# Patient Record
Sex: MALE | Race: WHITE | Employment: OTHER | URBAN - METROPOLITAN AREA
[De-identification: names, ages, dates, MRNs, and addresses within clinical notes are randomized per-mention and may not be internally consistent; named-entity substitution may affect disease eponyms.]

---

## 2017-01-01 ENCOUNTER — OFFICE VISIT (OUTPATIENT)
Dept: UROLOGY | Age: 82
End: 2017-01-01

## 2017-01-01 ENCOUNTER — OFFICE VISIT (OUTPATIENT)
Dept: INTERNAL MEDICINE CLINIC | Age: 82
End: 2017-01-01

## 2017-01-01 ENCOUNTER — DOCUMENTATION ONLY (OUTPATIENT)
Dept: UROLOGY | Age: 82
End: 2017-01-01

## 2017-01-01 ENCOUNTER — HOSPITAL ENCOUNTER (OUTPATIENT)
Dept: LAB | Age: 82
Discharge: HOME | End: 2017-10-18
Payer: MEDICARE

## 2017-01-01 ENCOUNTER — TELEPHONE (OUTPATIENT)
Dept: INTERNAL MEDICINE CLINIC | Age: 82
End: 2017-01-01

## 2017-01-01 VITALS
TEMPERATURE: 97.7 F | SYSTOLIC BLOOD PRESSURE: 100 MMHG | DIASTOLIC BLOOD PRESSURE: 64 MMHG | BODY MASS INDEX: 24.11 KG/M2 | WEIGHT: 178 LBS | HEIGHT: 72 IN | HEART RATE: 80 BPM | OXYGEN SATURATION: 98 %

## 2017-01-01 VITALS
BODY MASS INDEX: 24.79 KG/M2 | HEART RATE: 85 BPM | OXYGEN SATURATION: 98 % | WEIGHT: 183 LBS | RESPIRATION RATE: 16 BRPM | DIASTOLIC BLOOD PRESSURE: 62 MMHG | TEMPERATURE: 97.2 F | HEIGHT: 72 IN | SYSTOLIC BLOOD PRESSURE: 140 MMHG

## 2017-01-01 DIAGNOSIS — I10 ESSENTIAL HYPERTENSION: ICD-10-CM

## 2017-01-01 DIAGNOSIS — N40.1 BPH ASSOCIATED WITH NOCTURIA: Primary | ICD-10-CM

## 2017-01-01 DIAGNOSIS — R05.9 COUGH: Primary | ICD-10-CM

## 2017-01-01 DIAGNOSIS — E55.9 VITAMIN D INSUFFICIENCY: Chronic | ICD-10-CM

## 2017-01-01 DIAGNOSIS — R30.0 DYSURIA: ICD-10-CM

## 2017-01-01 DIAGNOSIS — N30.40 RADIATION CYSTITIS: ICD-10-CM

## 2017-01-01 DIAGNOSIS — Z00.00 MEDICARE ANNUAL WELLNESS VISIT, SUBSEQUENT: ICD-10-CM

## 2017-01-01 DIAGNOSIS — Z12.5 PROSTATE CANCER SCREENING: ICD-10-CM

## 2017-01-01 DIAGNOSIS — R06.2 WHEEZING: ICD-10-CM

## 2017-01-01 DIAGNOSIS — E03.9 HYPOTHYROIDISM, UNSPECIFIED TYPE: Chronic | ICD-10-CM

## 2017-01-01 DIAGNOSIS — N40.0 BENIGN PROSTATIC HYPERPLASIA, PRESENCE OF LOWER URINARY TRACT SYMPTOMS UNSPECIFIED, UNSPECIFIED MORPHOLOGY: ICD-10-CM

## 2017-01-01 DIAGNOSIS — R35.1 BPH ASSOCIATED WITH NOCTURIA: Primary | ICD-10-CM

## 2017-01-01 DIAGNOSIS — E78.5 DYSLIPIDEMIA: Chronic | ICD-10-CM

## 2017-01-01 DIAGNOSIS — Z12.5 PROSTATE CANCER SCREENING: Primary | ICD-10-CM

## 2017-01-01 LAB
25(OH)D3 SERPL-MCNC: 44.3 NG/ML (ref 30–100)
ALBUMIN SERPL-MCNC: 3.4 G/DL (ref 3.4–5)
ALBUMIN/GLOB SERPL: 0.9 {RATIO} (ref 0.8–1.7)
ALP SERPL-CCNC: 120 U/L (ref 45–117)
ALT SERPL-CCNC: 24 U/L (ref 16–61)
ANION GAP SERPL CALC-SCNC: 8 MMOL/L (ref 3–18)
AST SERPL-CCNC: 18 U/L (ref 15–37)
BASOPHILS # BLD: 0 K/UL (ref 0–0.06)
BASOPHILS NFR BLD: 0 % (ref 0–2)
BILIRUB SERPL-MCNC: 0.3 MG/DL (ref 0.2–1)
BILIRUB UR QL STRIP: NEGATIVE
BUN SERPL-MCNC: 19 MG/DL (ref 7–18)
BUN/CREAT SERPL: 22 (ref 12–20)
CALCIUM SERPL-MCNC: 9.6 MG/DL (ref 8.5–10.1)
CHLORIDE SERPL-SCNC: 103 MMOL/L (ref 100–108)
CHOLEST SERPL-MCNC: 209 MG/DL
CO2 SERPL-SCNC: 28 MMOL/L (ref 21–32)
CREAT SERPL-MCNC: 0.87 MG/DL (ref 0.6–1.3)
DIFFERENTIAL METHOD BLD: ABNORMAL
EOSINOPHIL # BLD: 0.2 K/UL (ref 0–0.4)
EOSINOPHIL NFR BLD: 2 % (ref 0–5)
ERYTHROCYTE [DISTWIDTH] IN BLOOD BY AUTOMATED COUNT: 15.4 % (ref 11.6–14.5)
GLOBULIN SER CALC-MCNC: 3.6 G/DL (ref 2–4)
GLUCOSE SERPL-MCNC: 99 MG/DL (ref 74–99)
GLUCOSE UR-MCNC: NEGATIVE MG/DL
HCT VFR BLD AUTO: 34.7 % (ref 36–48)
HDLC SERPL-MCNC: 76 MG/DL (ref 40–60)
HDLC SERPL: 2.8 {RATIO} (ref 0–5)
HGB BLD-MCNC: 10.9 G/DL (ref 13–16)
KETONES P FAST UR STRIP-MCNC: NEGATIVE MG/DL
LDLC SERPL CALC-MCNC: 100.4 MG/DL (ref 0–100)
LIPID PROFILE,FLP: ABNORMAL
LYMPHOCYTES # BLD: 1.1 K/UL (ref 0.9–3.6)
LYMPHOCYTES NFR BLD: 13 % (ref 21–52)
MCH RBC QN AUTO: 28.8 PG (ref 24–34)
MCHC RBC AUTO-ENTMCNC: 31.4 G/DL (ref 31–37)
MCV RBC AUTO: 91.6 FL (ref 74–97)
MONOCYTES # BLD: 0.6 K/UL (ref 0.05–1.2)
MONOCYTES NFR BLD: 7 % (ref 3–10)
NEUTS SEG # BLD: 6.6 K/UL (ref 1.8–8)
NEUTS SEG NFR BLD: 78 % (ref 40–73)
PH UR STRIP: 6.5 [PH] (ref 4.6–8)
PLATELET # BLD AUTO: 359 K/UL (ref 135–420)
PMV BLD AUTO: 9.6 FL (ref 9.2–11.8)
POTASSIUM SERPL-SCNC: 4.4 MMOL/L (ref 3.5–5.5)
PROT SERPL-MCNC: 7 G/DL (ref 6.4–8.2)
PROT UR QL STRIP: NEGATIVE MG/DL
PSA SERPL-MCNC: 0.2 NG/ML (ref 0–4)
RBC # BLD AUTO: 3.79 M/UL (ref 4.7–5.5)
SODIUM SERPL-SCNC: 139 MMOL/L (ref 136–145)
SP GR UR STRIP: 1.01 (ref 1–1.03)
TRIGL SERPL-MCNC: 163 MG/DL (ref ?–150)
TSH SERPL DL<=0.05 MIU/L-ACNC: 1.73 UIU/ML (ref 0.36–3.74)
UA UROBILINOGEN AMB POC: NORMAL (ref 0.2–1)
URINALYSIS CLARITY POC: CLEAR
URINALYSIS COLOR POC: YELLOW
URINE BLOOD POC: NEGATIVE
URINE LEUKOCYTES POC: NEGATIVE
URINE NITRITES POC: NEGATIVE
VLDLC SERPL CALC-MCNC: 32.6 MG/DL
WBC # BLD AUTO: 8.5 K/UL (ref 4.6–13.2)

## 2017-01-01 PROCEDURE — 80061 LIPID PANEL: CPT | Performed by: NURSE PRACTITIONER

## 2017-01-01 PROCEDURE — 84153 ASSAY OF PSA TOTAL: CPT | Performed by: NURSE PRACTITIONER

## 2017-01-01 PROCEDURE — 84443 ASSAY THYROID STIM HORMONE: CPT | Performed by: NURSE PRACTITIONER

## 2017-01-01 PROCEDURE — 85025 COMPLETE CBC W/AUTO DIFF WBC: CPT | Performed by: NURSE PRACTITIONER

## 2017-01-01 PROCEDURE — 80053 COMPREHEN METABOLIC PANEL: CPT | Performed by: NURSE PRACTITIONER

## 2017-01-01 PROCEDURE — 82306 VITAMIN D 25 HYDROXY: CPT | Performed by: NURSE PRACTITIONER

## 2017-01-01 RX ORDER — ALBUTEROL SULFATE 90 UG/1
2 AEROSOL, METERED RESPIRATORY (INHALATION)
Qty: 1 INHALER | Refills: 1 | Status: SHIPPED | OUTPATIENT
Start: 2017-01-01

## 2017-01-01 RX ORDER — CODEINE PHOSPHATE AND GUAIFENESIN 10; 100 MG/5ML; MG/5ML
SOLUTION ORAL
Qty: 120 ML | Refills: 0 | Status: SHIPPED | OUTPATIENT
Start: 2017-01-01

## 2017-01-01 RX ORDER — CEFUROXIME AXETIL 500 MG/1
500 TABLET ORAL 2 TIMES DAILY
Qty: 20 TAB | Refills: 0 | Status: SHIPPED | OUTPATIENT
Start: 2017-01-01 | End: 2017-01-01

## 2017-01-01 RX ORDER — OXYBUTYNIN CHLORIDE 5 MG/1
5 TABLET ORAL 3 TIMES DAILY
Qty: 90 TAB | Refills: 6 | Status: SHIPPED | OUTPATIENT
Start: 2017-01-01 | End: 2018-01-01 | Stop reason: SDUPTHER

## 2017-01-01 RX ORDER — TAMSULOSIN HYDROCHLORIDE 0.4 MG/1
CAPSULE ORAL
Qty: 90 CAP | Refills: 3 | Status: SHIPPED | OUTPATIENT
Start: 2017-01-01 | End: 2018-01-01 | Stop reason: SDUPTHER

## 2017-01-17 DIAGNOSIS — E03.9 HYPOTHYROIDISM, UNSPECIFIED TYPE: Chronic | ICD-10-CM

## 2017-01-17 RX ORDER — LEVOTHYROXINE SODIUM 88 UG/1
TABLET ORAL
Qty: 30 TAB | Refills: 0 | Status: SHIPPED | OUTPATIENT
Start: 2017-01-17 | End: 2017-02-15 | Stop reason: SDUPTHER

## 2017-01-24 ENCOUNTER — OFFICE VISIT (OUTPATIENT)
Dept: VASCULAR SURGERY | Age: 82
End: 2017-01-24

## 2017-01-24 DIAGNOSIS — I77.9 BILATERAL CAROTID ARTERY DISEASE (HCC): ICD-10-CM

## 2017-01-24 NOTE — PROCEDURES
[facility]   *** FINAL REPORT ***    Name: Jan Cedillo  MRN: [id number]       Outpatient  : 15 Oct 1935  HIS Order #: [id number]  [address] [facility] Visit #: [id number]  Date: 2017    TYPE OF TEST: Cerebrovascular Duplex    REASON FOR TEST  F/U CEA 6 mo, 12 mo,     Right Carotid:-             Proximal               Mid                 Distal  cm/s  Systolic  Diastolic  Systolic  Diastolic  Systolic  Diastolic  CCA:     62.5      18.0                            87.0      15.0  Bulb:   108.0      20.0  ECA:    114.0       4.0  ICA:    166.0      36.0      164.0      34.0      118.0      26.0  ICA/CCA:  1.7       2.0    ICA Stenosis: 50-69%    Right Vertebral:-  Finding: Antegrade  Sys:       88.0  Iva:       20.0    Right Subclavian:    Left Carotid:-            Proximal                Mid                 Distal  cm/s  Systolic  Diastolic  Systolic  Diastolic  Systolic  Diastolic  CCA:    380.0      14.0                           119.0      19.0  Bulb:    62.0      10.0  ECA:    105.0       3.0  ICA:     87.0      22.0       93.0      22.0      102.0      25.0  ICA/CCA:  0.9       1.3    ICA Stenosis: <50%    Left Vertebral:-  Finding: Antegrade  Sys:       71.0  Iva:       10.0    Left Subclavian:    INTERPRETATION/FINDINGS  Duplex images were obtained using 2-D gray scale, color flow and  spectral doppler analysis. 1. Moderate 50-69% stenosis in the right internal carotid artery. 2. Patent left carotid artery endarterectomy with mild plaquing of the   left internal carotid artery in the less than 50% range. 3. No significant stenosis in the external carotid arteries  bilaterally. 4. Antegrade flow in both vertebral arteries. Plaque Morphology:  1. Heterogeneous plaque in the bulb and right ICA. 2. Heterogeneous plaque in the bulb and left ICA. ADDITIONAL COMMENTS    I have personally reviewed the data relevant to the interpretation of  this  study. TECHNOLOGIST: Mihaela Dee, VIANCA, BS  Signed: 01/24/2017 11:25 AM    PHYSICIAN: Floyd Ambrose D.O.   Signed: 01/24/2017 01:07 PM

## 2017-02-02 ENCOUNTER — OFFICE VISIT (OUTPATIENT)
Dept: INTERNAL MEDICINE CLINIC | Age: 82
End: 2017-02-02

## 2017-02-02 ENCOUNTER — HOSPITAL ENCOUNTER (OUTPATIENT)
Dept: LAB | Age: 82
Discharge: HOME | End: 2017-02-02
Payer: MEDICARE

## 2017-02-02 VITALS
BODY MASS INDEX: 23.16 KG/M2 | WEIGHT: 171 LBS | RESPIRATION RATE: 18 BRPM | SYSTOLIC BLOOD PRESSURE: 136 MMHG | DIASTOLIC BLOOD PRESSURE: 70 MMHG | HEART RATE: 71 BPM | OXYGEN SATURATION: 98 % | TEMPERATURE: 98.6 F | HEIGHT: 72 IN

## 2017-02-02 DIAGNOSIS — E03.8 OTHER SPECIFIED HYPOTHYROIDISM: Chronic | ICD-10-CM

## 2017-02-02 DIAGNOSIS — D62 ANEMIA DUE TO ACUTE BLOOD LOSS: ICD-10-CM

## 2017-02-02 DIAGNOSIS — E03.8 OTHER SPECIFIED HYPOTHYROIDISM: Primary | Chronic | ICD-10-CM

## 2017-02-02 DIAGNOSIS — I10 ESSENTIAL HYPERTENSION: ICD-10-CM

## 2017-02-02 DIAGNOSIS — E78.5 DYSLIPIDEMIA: Chronic | ICD-10-CM

## 2017-02-02 LAB
BASOPHILS # BLD AUTO: 0 K/UL (ref 0–0.06)
BASOPHILS # BLD: 1 % (ref 0–2)
DIFFERENTIAL METHOD BLD: ABNORMAL
EOSINOPHIL # BLD: 0.1 K/UL (ref 0–0.4)
EOSINOPHIL NFR BLD: 2 % (ref 0–5)
ERYTHROCYTE [DISTWIDTH] IN BLOOD BY AUTOMATED COUNT: 16.7 % (ref 11.6–14.5)
HCT VFR BLD AUTO: 37.8 % (ref 36–48)
HGB BLD-MCNC: 11.8 G/DL (ref 13–16)
LYMPHOCYTES # BLD AUTO: 17 % (ref 21–52)
LYMPHOCYTES # BLD: 1.1 K/UL (ref 0.9–3.6)
MCH RBC QN AUTO: 28.3 PG (ref 24–34)
MCHC RBC AUTO-ENTMCNC: 31.2 G/DL (ref 31–37)
MCV RBC AUTO: 90.6 FL (ref 74–97)
MONOCYTES # BLD: 0.6 K/UL (ref 0.05–1.2)
MONOCYTES NFR BLD AUTO: 9 % (ref 3–10)
NEUTS SEG # BLD: 4.6 K/UL (ref 1.8–8)
NEUTS SEG NFR BLD AUTO: 71 % (ref 40–73)
PLATELET # BLD AUTO: 306 K/UL (ref 135–420)
PMV BLD AUTO: 9.9 FL (ref 9.2–11.8)
RBC # BLD AUTO: 4.17 M/UL (ref 4.7–5.5)
TSH SERPL DL<=0.05 MIU/L-ACNC: 3.55 UIU/ML (ref 0.36–3.74)
WBC # BLD AUTO: 6.5 K/UL (ref 4.6–13.2)

## 2017-02-02 PROCEDURE — 85025 COMPLETE CBC W/AUTO DIFF WBC: CPT | Performed by: NURSE PRACTITIONER

## 2017-02-02 PROCEDURE — 84443 ASSAY THYROID STIM HORMONE: CPT | Performed by: NURSE PRACTITIONER
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
## 2017-02-02 NOTE — MR AVS SNAPSHOT
Visit Information Date & Time Provider Department Dept. Phone Encounter #  
 2/2/2017 10:30 AM Carol Saldaña NP [facility] INTERNAL MEDICINE OF [address] [place] [address] Drive [phone] [id number] Follow-up Instructions Return in about 3 months (around 4/28/2017). Your Appointments 2/6/2017 10:00 AM  
Follow Up with [facility] (KEVEN SCHEDULING) Appt Note: . [address] [address]  
[phone]  
  
   
 [facility] [address]  
  
    
 4/25/2017 10:45 AM  
ULTRASOUND with Rivas Calzada MD  
[facility] [address]) Appt Note: PVR  
 [address] [place] [address] [address]  
[phone] [address] [place] [id number] Upcoming Health Maintenance Date Due DTaP/Tdap/Td series (1 - Tdap) 10/15/1958 ZOSTER VACCINE AGE 60> 10/15/1997 GLAUCOMA SCREENING Q2Y 10/15/2002 Pneumococcal 65+ Low/Medium Risk (2 of 2 - PCV13) 3/29/2017 MEDICARE YEARLY EXAM 10/19/2017 Allergies as of 2/2/2017  Review Complete On: 2/2/2017 By: Carol Saldaña NP No Known Allergies Current Immunizations  Reviewed on 10/18/2016 Name Date Influenza High Dose Vaccine PF 10/18/2016 Influenza Vaccine 10/25/2013 Influenza Vaccine PF 10/27/2015 Pneumococcal Polysaccharide (PPSV-23) 3/29/2016 12:55 PM  
  
 Not reviewed this visit You Were Diagnosed With   
  
 Codes Comments Other specified hypothyroidism    -  Primary ICD-10-CM: E03.8 ICD-9-CM: 244.8 Essential hypertension     ICD-10-CM: I10 
ICD-9-CM: 401.9 Anemia due to acute blood loss     ICD-10-CM: D62 
ICD-9-CM: 285.1 Vitals BP Pulse Temp Resp Height(growth percentile) Weight(growth percentile) 136/70 (BP 1 Location: Left arm, BP Patient Position: Sitting) 71 98.6 °F (37 °C) (Tympanic) 18 6' (1.829 m) 171 lb (77.6 kg) SpO2 BMI Smoking Status 98% 23.19 kg/m2 Former Smoker BMI and BSA Data Body Mass Index Body Surface Area  
 23.19 kg/m 2 1.99 m 2 Preferred Pharmacy Pharmacy Name Phone [facility] [address] [facility] [phone] Your Updated Medication List  
  
   
This list is accurate as of: 2/2/17 11:51 AM.  Always use your most recent med list. amLODIPine 5 mg tablet Commonly known as:  Wandra Sakshi TAKE ONE TABLET BY MOUTH ONCE DAILY  
  
 aspirin 81 mg chewable tablet Take 1 Tab by mouth daily. clopidogrel 75 mg Tab Commonly known as:  PLAVIX Take 1 Tab by mouth daily (with dinner). isosorbide mononitrate ER 60 mg CR tablet Commonly known as:  IMDUR  
1 tablet each AM  
  
 levothyroxine 88 mcg tablet Commonly known as:  SYNTHROID  
TAKE ONE TABLET BY MOUTH ONCE DAILY BEFORE BREAKFAST  
  
 lovastatin 20 mg tablet Commonly known as:  MEVACOR Take 1 Tab by mouth daily. MIRALAX PO Take  by mouth as needed. multivitamin with iron tablet Take 1 Tab by mouth daily. OTHER Incentive Spirometry- Use as directed  
  
 oxybutynin 5 mg tablet Commonly known as:  BNHWIWHP Take 1 Tab by mouth three (3) times daily. Indications: BLADDER HYPERACTIVITY, URINARY URGENCY  
  
 oxyCODONE-acetaminophen 7.5-325 mg per tablet Commonly known as:  PERCOCET 7.5  
1 tablet every six hours as needed for pain  
  
 tamsulosin 0.4 mg capsule Commonly known as:  FLOMAX  
1 tablet daily at supper Follow-up Instructions Return in about 3 months (around 4/28/2017). Introducing [facility]! Vahid Ortega introduces [employer] patient portal. Now you can access parts of your medical record, email your doctor's office, and request medication refills online. 1. In your internet browser, go to [email or web address] 2. Click on the First Time User? Click Here link in the Sign In box. You will see the New Member Sign Up page. 3. Enter your Wi3 Access Code exactly as it appears below. You will not need to use this code after youve completed the sign-up process. If you do not sign up before the expiration date, you must request a new code. · Wi3 Access Code: [email or web address] Expires: 3/20/2017 10:15 AM 
 
4. Enter the last four digits of your Social Security Number (xxxx) and Date of Birth (mm/dd/yyyy) as indicated and click Submit. You will be taken to the next sign-up page. 5. Create a Wi3 ID. This will be your Wi3 login ID and cannot be changed, so think of one that is secure and easy to remember. 6. Create a Wi3 password. You can change your password at any time. 7. Enter your Password Reset Question and Answer. This can be used at a later time if you forget your password. 8. Enter your e-mail address. You will receive e-mail notification when new information is available in [address] 9. Click Sign Up. You can now view and download portions of your medical record. 10. Click the Download Summary menu link to download a portable copy of your medical information. If you have questions, please visit the Frequently Asked Questions section of the Wi3 website. Remember, Wi3 is NOT to be used for urgent needs. For medical emergencies, dial 911. Now available from your iPhone and Android! Please provide this summary of care documentation to your next provider. Your primary care clinician is listed as Sandra Lenz. If you have any questions after today's visit, please call [phone].

## 2017-02-02 NOTE — PROGRESS NOTES
Patient presents for   Chief Complaint   Patient presents with    Well Male     Fall risk assessment was not indicated. Depression screening was not indicated Follow up questions were not indicated. 1. Have you been to the ER, urgent care clinic since your last visit? Hospitalized since your last visit? No    2. Have you seen or consulted any other health care providers outside of the [address] [place] [address] since your last visit? Include any pap smears or colon screening.  No

## 2017-02-02 NOTE — ACP (ADVANCE CARE PLANNING)
Non-Provider Advance Care Planning (ACP) Note    Date of ACP Conversation: 2/2/2017  Persons included in Conversation: patient  Length of ACP Conversation in minutes: <16 minutes (Non-Billable)    Conversation requested by:   Provider      Authorized Decision Maker (if patient is incapable of making informed decisions): This person is:   Other Legally Authorized Decision Maker (e.g. Next of Kin)    General ACP for ALL Patients with Decision Making Capacity:    Advance Directive Conversation with Patients who have not yet planned:  Importance of advance care planning, including choosing a healthcare agent to communicate patient's healthcare decisions if patient lost the ability to make decisions, such as after a sudden illness or accident  Reviewed healthcare agent role and authority/roth  Recommended additional conversation with prospective agent    Review of Existing Advance Directive: (Select questions covered)  No Advanced Directive    Interventions Provided:  Provided ACP educational materials:  Advance Directive Form  Recommended completion of Advance Directive after review of materials, conversation with prospective healthcare agent about (and others as needed), and readiness to complete a written plan  Referral to Provider for additional medical information/discussion

## 2017-02-06 ENCOUNTER — OFFICE VISIT (OUTPATIENT)
Dept: VASCULAR SURGERY | Age: 82
End: 2017-02-06

## 2017-02-06 VITALS
BODY MASS INDEX: 23.16 KG/M2 | SYSTOLIC BLOOD PRESSURE: 132 MMHG | RESPIRATION RATE: 18 BRPM | HEART RATE: 74 BPM | HEIGHT: 72 IN | DIASTOLIC BLOOD PRESSURE: 70 MMHG | WEIGHT: 171 LBS

## 2017-02-06 DIAGNOSIS — Z96.642 S/P HIP REPLACEMENT, LEFT: ICD-10-CM

## 2017-02-06 DIAGNOSIS — Z98.890 HISTORY OF LEFT-SIDED CAROTID ENDARTERECTOMY: ICD-10-CM

## 2017-02-06 DIAGNOSIS — Z95.828 S/P VASCULAR BYPASS: ICD-10-CM

## 2017-02-06 DIAGNOSIS — I65.23 BILATERAL CAROTID ARTERY STENOSIS: ICD-10-CM

## 2017-02-06 DIAGNOSIS — I73.9 PERIPHERAL VASCULAR DISEASE (HCC): Primary | Chronic | ICD-10-CM
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
## 2017-02-06 NOTE — MR AVS SNAPSHOT
Visit Information Date & Time Provider Department Dept. Phone Encounter #  
 2/6/2017 10:00  [facility] Vein/Vascular [address] [facility] [id number] Your Appointments 3/3/2017 12:45 PM  
PROCEDURE with [facility] IMAGING 2  
[facility] (KEVEN SCHEDULING) Appt Note: [facility] [address] [address]  
[phone] [address] [address]  
  
    
 3/3/2017  1:45 PM  
PROCEDURE with [facility] IMAGING 2  
[facility] (KEVEN SCHEDULING) Appt Note: [facility] [address] [address]  
[phone]  
  
    
 3/3/2017  2:45 PM  
PROCEDURE with [facility] NONIMAGING  
[facility] (KEVEN SCHEDULING) Appt Note: [facility] [address] [address]  
[phone] [address] [address]  
  
    
 4/25/2017 10:45 AM  
ULTRASOUND with Marisol Maravilla MD  
[facility] [address]) Appt Note: PVR  
 [address] [facility] [address] [address]  
[phone] [address] [address]  
  
    
 4/28/2017 10:00 AM  
Follow Up with Bruna Wade NP  
[facility] [place] ([address]) Appt Note: 3 mos [address], [address]  
  
   
 [address], [address] [place] [address] Upcoming Health Maintenance Date Due DTaP/Tdap/Td series (1 - Tdap) 10/15/1956 ZOSTER VACCINE AGE 60> 10/15/1997 GLAUCOMA SCREENING Q2Y 10/15/2002 Pneumococcal 65+ Low/Medium Risk (2 of 2 - PCV13) 3/29/2017 MEDICARE YEARLY EXAM 10/19/2017 Allergies as of 2/6/2017  Review Complete On: 2/6/2017 By: Danilo Bella RN No Known Allergies Current Immunizations  Reviewed on 10/18/2016 Name Date Influenza High Dose Vaccine PF 10/18/2016 Influenza Vaccine 10/25/2013 Influenza Vaccine PF 10/27/2015 Pneumococcal Polysaccharide (PPSV-23) 3/29/2016 12:55 PM  
  
 Not reviewed this visit You Were Diagnosed With   
  
 Codes Comments Peripheral vascular disease ([facility]    -  Primary ICD-10-CM: I73.9 ICD-9-CM: 443. 9 Vitals BP Pulse Resp Height(growth percentile) Weight(growth percentile) BMI  
 132/70 (BP 1 Location: Left arm, BP Patient Position: Sitting) 74 18 6' (1.829 m) 171 lb (77.6 kg) 23.19 kg/m2 Smoking Status Former Smoker BMI and BSA Data Body Mass Index Body Surface Area  
 23.19 kg/m 2 1.99 m 2 Preferred Pharmacy Pharmacy Name Phone [facility] [phone] [facility] [phone] Your Updated Medication List  
  
   
This list is accurate as of: 2/6/17 10:42 AM.  Always use your most recent med list. amLODIPine 5 mg tablet Commonly known as:  [place] TAKE ONE TABLET BY MOUTH ONCE DAILY  
  
 aspirin 81 mg chewable tablet Take 1 Tab by mouth daily. clopidogrel 75 mg Tab Commonly known as:  PLAVIX Take 1 Tab by mouth daily (with dinner). isosorbide mononitrate ER 60 mg CR tablet Commonly known as:  IMDUR  
1 tablet each AM  
  
 levothyroxine 88 mcg tablet Commonly known as:  SYNTHROID  
TAKE ONE TABLET BY MOUTH ONCE DAILY BEFORE BREAKFAST  
  
 lovastatin 20 mg tablet Commonly known as:  MEVACOR Take 1 Tab by mouth daily. MIRALAX PO Take  by mouth as needed. multivitamin with iron tablet Take 1 Tab by mouth daily. OTHER Incentive Spirometry- Use as directed  
  
 oxybutynin 5 mg tablet Commonly known as:  BHXZFSYW Take 1 Tab by mouth three (3) times daily. Indications: BLADDER HYPERACTIVITY, URINARY URGENCY  
  
 oxyCODONE-acetaminophen 7.5-325 mg per tablet Commonly known as:  PERCOCET 7.5 1 tablet every six hours as needed for pain  
  
 tamsulosin 0.4 mg capsule Commonly known as:  FLOMAX  
1 tablet daily at supper To-Do List   
 03/03/2017 Imaging:  PHIL WBI LTD SINGLE LEVEL AMB   
  
 03/03/2017 Imaging:  DUPLEX LOWER EXT ARTER/GRAFTS BILAT AMB Please provide this summary of care documentation to your next provider. Your primary care clinician is listed as Miller Or. If you have any questions after today's visit, please call [phone].

## 2017-02-07 ENCOUNTER — TELEPHONE (OUTPATIENT)
Dept: INTERNAL MEDICINE CLINIC | Age: 82
End: 2017-02-07

## 2017-02-07 NOTE — TELEPHONE ENCOUNTER
Contacted Juliet Freire and informed Him of lab results per Rosemary Fernandez NP's request. Juliet Freire expressed understanding.

## 2017-02-07 NOTE — TELEPHONE ENCOUNTER
----- Message from Anna Reyez NP sent at 2/7/2017  4:29 PM EST -----  Please notify patient that lab results were reviewed and the results are normal.

## 2017-02-15 DIAGNOSIS — E03.9 HYPOTHYROIDISM, UNSPECIFIED TYPE: Chronic | ICD-10-CM

## 2017-02-15 DIAGNOSIS — I10 ESSENTIAL HYPERTENSION: ICD-10-CM

## 2017-02-15 RX ORDER — ISOSORBIDE MONONITRATE 60 MG/1
TABLET, EXTENDED RELEASE ORAL
Qty: 90 TAB | Refills: 3 | Status: SHIPPED | OUTPATIENT
Start: 2017-02-15 | End: 2018-01-01 | Stop reason: SDUPTHER

## 2017-02-15 RX ORDER — AMLODIPINE BESYLATE 5 MG/1
TABLET ORAL
Qty: 90 TAB | Refills: 3 | Status: SHIPPED | OUTPATIENT
Start: 2017-02-15 | End: 2018-01-01 | Stop reason: SDUPTHER

## 2017-02-15 RX ORDER — LEVOTHYROXINE SODIUM 88 UG/1
TABLET ORAL
Qty: 90 TAB | Refills: 3 | Status: SHIPPED | OUTPATIENT
Start: 2017-02-15 | End: 2018-01-01 | Stop reason: SDUPTHER

## 2017-02-15 NOTE — TELEPHONE ENCOUNTER
Requested Prescriptions     Pending Prescriptions Disp Refills    levothyroxine (SYNTHROID) 88 mcg tablet 30 Tab 0    amLODIPine (NORVASC) 5 mg tablet 30 Tab 0    isosorbide mononitrate ER (IMDUR) 60 mg CR tablet 30 Tab 6     Si tablet each AM     Would like 90 day prescriptions

## 2017-02-16 ENCOUNTER — HOSPITAL ENCOUNTER (EMERGENCY)
Age: 82
Discharge: HOME | End: 2017-02-16
Attending: EMERGENCY MEDICINE | Admitting: EMERGENCY MEDICINE
Payer: MEDICARE

## 2017-02-16 ENCOUNTER — APPOINTMENT (OUTPATIENT)
Dept: GENERAL RADIOLOGY | Age: 82
End: 2017-02-16
Attending: PHYSICIAN ASSISTANT
Payer: MEDICARE

## 2017-02-16 VITALS
SYSTOLIC BLOOD PRESSURE: 140 MMHG | HEART RATE: 70 BPM | TEMPERATURE: 98.9 F | BODY MASS INDEX: 23.16 KG/M2 | HEIGHT: 72 IN | WEIGHT: 171 LBS | DIASTOLIC BLOOD PRESSURE: 80 MMHG | RESPIRATION RATE: 20 BRPM | OXYGEN SATURATION: 99 %

## 2017-02-16 DIAGNOSIS — I73.9 PERIPHERAL VASCULAR DISEASE (HCC): Chronic | ICD-10-CM

## 2017-02-16 DIAGNOSIS — L03.032 CELLULITIS OF TOE OF LEFT FOOT: Primary | ICD-10-CM

## 2017-02-16 LAB
ANION GAP BLD CALC-SCNC: 9 MMOL/L (ref 3–18)
BASOPHILS # BLD AUTO: 0 K/UL (ref 0–0.06)
BASOPHILS # BLD: 0 % (ref 0–2)
BUN SERPL-MCNC: 19 MG/DL (ref 7–18)
BUN/CREAT SERPL: 19 (ref 12–20)
CALCIUM SERPL-MCNC: 9.2 MG/DL (ref 8.5–10.1)
CHLORIDE SERPL-SCNC: 102 MMOL/L (ref 100–108)
CO2 SERPL-SCNC: 29 MMOL/L (ref 21–32)
CREAT SERPL-MCNC: 1.01 MG/DL (ref 0.6–1.3)
DIFFERENTIAL METHOD BLD: ABNORMAL
EOSINOPHIL # BLD: 0 K/UL (ref 0–0.4)
EOSINOPHIL NFR BLD: 0 % (ref 0–5)
ERYTHROCYTE [DISTWIDTH] IN BLOOD BY AUTOMATED COUNT: 16.6 % (ref 11.6–14.5)
GLUCOSE SERPL-MCNC: 108 MG/DL (ref 74–99)
HCT VFR BLD AUTO: 35.1 % (ref 36–48)
HGB BLD-MCNC: 10.8 G/DL (ref 13–16)
LYMPHOCYTES # BLD AUTO: 4 % (ref 21–52)
LYMPHOCYTES # BLD: 0.4 K/UL (ref 0.9–3.6)
MCH RBC QN AUTO: 27.9 PG (ref 24–34)
MCHC RBC AUTO-ENTMCNC: 30.8 G/DL (ref 31–37)
MCV RBC AUTO: 90.7 FL (ref 74–97)
MONOCYTES # BLD: 0.5 K/UL (ref 0.05–1.2)
MONOCYTES NFR BLD AUTO: 5 % (ref 3–10)
NEUTS SEG # BLD: 8.8 K/UL (ref 1.8–8)
NEUTS SEG NFR BLD AUTO: 91 % (ref 40–73)
PLATELET # BLD AUTO: 221 K/UL (ref 135–420)
PMV BLD AUTO: 9.5 FL (ref 9.2–11.8)
POTASSIUM SERPL-SCNC: 4 MMOL/L (ref 3.5–5.5)
RBC # BLD AUTO: 3.87 M/UL (ref 4.7–5.5)
SODIUM SERPL-SCNC: 140 MMOL/L (ref 136–145)
WBC # BLD AUTO: 9.7 K/UL (ref 4.6–13.2)

## 2017-02-16 PROCEDURE — 99282 EMERGENCY DEPT VISIT SF MDM: CPT

## 2017-02-16 PROCEDURE — 80048 BASIC METABOLIC PNL TOTAL CA: CPT

## 2017-02-16 PROCEDURE — 85025 COMPLETE CBC W/AUTO DIFF WBC: CPT

## 2017-02-16 PROCEDURE — 96365 THER/PROPH/DIAG IV INF INIT: CPT

## 2017-02-16 PROCEDURE — 74011000258 HC RX REV CODE- 258: Performed by: PHYSICIAN ASSISTANT

## 2017-02-16 PROCEDURE — 73620 X-RAY EXAM OF FOOT: CPT

## 2017-02-16 PROCEDURE — 74011250636 HC RX REV CODE- 250/636: Performed by: PHYSICIAN ASSISTANT

## 2017-02-16 RX ORDER — CEPHALEXIN 500 MG/1
500 CAPSULE ORAL 4 TIMES DAILY
Qty: 28 CAP | Refills: 0 | Status: SHIPPED | OUTPATIENT
Start: 2017-02-16 | End: 2017-02-23

## 2017-02-16 RX ORDER — SULFAMETHOXAZOLE AND TRIMETHOPRIM 800; 160 MG/1; MG/1
1 TABLET ORAL 2 TIMES DAILY
Qty: 14 TAB | Refills: 0 | Status: SHIPPED | OUTPATIENT
Start: 2017-02-16 | End: 2017-02-23

## 2017-02-16 RX ADMIN — CEFTRIAXONE 1 G: 1 INJECTION, POWDER, FOR SOLUTION INTRAMUSCULAR; INTRAVENOUS at 13:12
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
## 2017-02-16 NOTE — PROGRESS NOTES
Pt given IV rocephin in ED for coverage, scheduled to see podiatrist tomorrow.  Malika Kenney PA-C 6:21 PM

## 2017-02-16 NOTE — Clinical Note
SPECIFIC PATIENT INSTRUCTIONS FROM THE PHYSICIAN WHO TREATED YOU IN THE ER TODAY: 
1. Return if any concerns or worsening of condition(s) 2. Keflex and Bactrim DS as prescribed until finished. 3. Take all other medications as directed. 4. Follow up wi th your podiatrist tomorrow for reevaluation in the ER in 48 hours if unable to be seen by podiatrist. Follow up with primary doctor in a week.

## 2017-02-17 ENCOUNTER — PATIENT OUTREACH (OUTPATIENT)
Dept: INTERNAL MEDICINE CLINIC | Age: 82
End: 2017-02-17

## 2017-02-17 NOTE — PATIENT INSTRUCTIONS
Peripheral Arterial Disease of the Leg and Cellulitis instructions reviewed with patient, he verbalizes understanding.

## 2017-02-17 NOTE — PROGRESS NOTES
NNTOCED      Date/Time:  2/17/2017 3:16 PM    Patient listed on discharge (MSSP) report on 2/17/17. Patient discharged from [facility] ED for cellulitis left toe, PVD. ED Provider Notes - ED Notes   ED Provider Notes by Amanda Desai PA-C at 02/16/17 5693   Attestation signed by Hsusain Barnett DO at 02/16/17 1309   I was personally available for consultation in the emergency department. I have reviewed the chart prior to the patient's discharge and agree with the documentation recorded by the [facility], including the assessment, treatment plan, and disposition.         Expand All Collapse All    HPI Comments: Pt is a 81 yo male with a hx of MRSA, PVD, left large toe amputation, presenting to the ED for left second toe pain, erythema, and swelling which began 2-3 days ago and has been worsening, now with erythema spread to front of left foot and remaining 3-5 toes. Yesterday pt noted some black spots to the end of the toe. Pt has had a \"sore\" to the second toe ongoing. Had cellulitis in same toe 1 mo ago, resolved with antibiotics. Pt was told by podiatrist to come here for x-ray and assessment for gangrene. Pt has had numbness in foot, but notes this is his baseline. Had associated chills last night. Pt without any other complaints at this time.     Patient is a 80 y.o. male presenting with toe pain. The history is provided by the patient. Toe Pain    This is a new problem. The current episode started 2 days ago. The problem occurs constantly. The problem has been gradually worsening. The pain is present in the left toes. The quality of the pain is described as aching. The pain is at a severity of 4/10. Associated symptoms include numbness and limited range of motion. Pertinent negatives include no stiffness, no tingling, no itching, no back pain and no neck pain. The symptoms are aggravated by movement and palpation. He has tried nothing for the symptoms. There has been no history of extremity trauma. RRAT score: 16 Moderate    Medical History:     Past Medical History   Diagnosis Date    Acute lower GI bleeding 2016    Anemia due to acute blood loss 2016     Attributed to lower GI bleed    Benign hypertension without congestive heart failure     Bleeding risk due to aspirin 2016     Aspirin + Clopidogrel    Cancer (HCC)      hx squamous cell    Carotid artery disease ([facility] [address] 3/8/2016    Chemotherapy convalescence or palliative care 2013 to 2014     Rectal CA    Chronic anemia     Decreased calculated glomerular filtration rate (GFR) 2016     Calculated GFR equivalent to that of CKD stage 2 = 60-89 ml/min    Diastolic dysfunction without heart failure     Dyslipidemia     Dyspepsia and other specified disorders of function of stomach     History of lower GI bleeding 2016    History of malignant neoplasm of anus 2013     Squamous cell carcinoma of the anus; S/P Chemotherapy & radiation therapy (2014)    History of MRSA infection 2015     Culture of surgical wound from left great toe (2015): POSITIVE for MRSA    History of peptic ulcer disease     Hypercholesterolemia     Hypothyroidism     Long term current use of anticoagulant therapy     Non-ST elevation myocardial infarction (NSTEMI) ([facility] [address] 2016    Peripheral vascular disease ([facility] [address]     Radiation therapy complication 4470 to 2014     Rectal CA    Spinal stenosis of lumbar region     Vitamin D insufficiency 2016     Vitamin D 25-Hydroxy (2016) = 25.6       Nurse Navigator(NN) contacted the patient by telephone to perform post ED discharge assessment. Verified  and address with patient as identifiers. Provided introduction to self, and explanation of the Nurses Navigator role. Patient reports that pain level is usually around 4 but at this point he can not wear sock and shoe due to pain. States he is keeping foot elevated as much as possible.    Patient reports long history of constipation and limits his Percocet to bedtime so that he can rest.    Diet:   Patient reports: Regular Diet    Activity:    Patient reports: as tolerated    Medication:   Performed medication reconciliation with patient, and patient verbalizes understanding of administration of home medications. There were no barriers to obtaining medications identified at this time. Support system:  Spouse, family and friends    Discharge Instructions :  Reviewed discharge instructions with patient. Patient verbalizes understanding of discharge instructions and follow-up care. Red Flags:  Increased pain, redness or swelling  Purulent drainage  Temp > 100.5  Any redness 'streaking' up foot/leg from toe    Labs Reviewed:  Recent Labs      02/16/17   1219   WBC  9.7   HGB  10.8*   HCT  35.1*   PLT  221     Recent Labs      02/16/17   1219   NA  140   K  4.0   CL  102   CO2  29   GLU  108*   BUN  19*   CREA  1.01   CA  9.2       Imaging results reviewed:  Study Result   Left foot - 2 view(s)     History: Left second toe infection     Comparison: Left foot radiograph 12/29/2015.     Findings:      Mineralization is decreased. Amputation redemonstrated to the first metatarsal  mid diaphysis. Worsening osteopenia is particularly concentrated at the third  through fifth metatarsophalangeal joints with poor definition of the cortex. There is no evidence of acute fracture or dislocation. The joint spaces are  maintained. There is no radiopaque foreign body. Soft tissue swelling appears  prominent along the medial and dorsal forefoot. Small plantar calcaneal spur and  minimal Achilles enthesopathy. Vascular calcifications are present.      IMPRESSION  Impression:     Worsening osteopenia at the third through fifth metatarsophalangeal joints.   Osteomyelitis cannot be entirely excluded radiographically.     Medial and dorsal forefoot soft tissue swelling.     Atherosclerotic disease.            PCP/Specialist follow up: Patient scheduled to follow up with [facility] next week. Patient declined assistance to schedule appointment with Dr. Jaylon Gardner at this time. Patient to call Dr. Dayne June next week. Reviewed red flags with patient, and patient verbalizes understanding. Patient given an opportunity to ask questions. No other clinical/social/functional needs noted. The patient agrees to contact the PCP office for questions related to their healthcare. The patient expressed thanks, offered no additional questions and ended the call. Case management plan: Will continue to follow as necessary for the next 30 days. Will reassess for case management needs prior to discharge from case management service on or about 30 days.

## 2017-02-20 ENCOUNTER — HOSPITAL ENCOUNTER (INPATIENT)
Age: 82
LOS: 8 days | Discharge: HOME HEALTH | DRG: 253 | End: 2017-02-28
Attending: EMERGENCY MEDICINE | Admitting: PODIATRIST
Payer: MEDICARE

## 2017-02-20 ENCOUNTER — APPOINTMENT (OUTPATIENT)
Dept: GENERAL RADIOLOGY | Age: 82
DRG: 253 | End: 2017-02-20
Attending: EMERGENCY MEDICINE
Payer: MEDICARE

## 2017-02-20 DIAGNOSIS — M86.672 CHRONIC OSTEOMYELITIS OF LEFT FOOT (HCC): Primary | ICD-10-CM

## 2017-02-20 DIAGNOSIS — I96 GANGRENOUS TOE (HCC): ICD-10-CM

## 2017-02-20 PROBLEM — M86.9 OSTEOMYELITIS (HCC): Status: ACTIVE | Noted: 2017-02-20

## 2017-02-20 LAB
ALBUMIN SERPL BCP-MCNC: 3.3 G/DL (ref 3.4–5)
ALBUMIN/GLOB SERPL: 0.8 {RATIO} (ref 0.8–1.7)
ALP SERPL-CCNC: 117 U/L (ref 45–117)
ALT SERPL-CCNC: 28 U/L (ref 16–61)
ANION GAP BLD CALC-SCNC: 8 MMOL/L (ref 3–18)
APTT PPP: 31.2 SEC (ref 23–36.4)
AST SERPL W P-5'-P-CCNC: 14 U/L (ref 15–37)
ATRIAL RATE: 67 BPM
BASOPHILS # BLD AUTO: 0 K/UL (ref 0–0.06)
BASOPHILS # BLD: 0 % (ref 0–2)
BILIRUB SERPL-MCNC: 0.3 MG/DL (ref 0.2–1)
BUN SERPL-MCNC: 19 MG/DL (ref 7–18)
BUN/CREAT SERPL: 14 (ref 12–20)
CALCIUM SERPL-MCNC: 9.5 MG/DL (ref 8.5–10.1)
CALCULATED P AXIS, ECG09: 79 DEGREES
CALCULATED R AXIS, ECG10: 64 DEGREES
CALCULATED T AXIS, ECG11: 67 DEGREES
CHLORIDE SERPL-SCNC: 99 MMOL/L (ref 100–108)
CO2 SERPL-SCNC: 28 MMOL/L (ref 21–32)
CREAT SERPL-MCNC: 1.35 MG/DL (ref 0.6–1.3)
DIAGNOSIS, 93000: NORMAL
DIFFERENTIAL METHOD BLD: ABNORMAL
EOSINOPHIL # BLD: 0.1 K/UL (ref 0–0.4)
EOSINOPHIL NFR BLD: 1 % (ref 0–5)
ERYTHROCYTE [DISTWIDTH] IN BLOOD BY AUTOMATED COUNT: 15.7 % (ref 11.6–14.5)
GLOBULIN SER CALC-MCNC: 4.4 G/DL (ref 2–4)
GLUCOSE SERPL-MCNC: 191 MG/DL (ref 74–99)
HCT VFR BLD AUTO: 36.1 % (ref 36–48)
HGB BLD-MCNC: 11.7 G/DL (ref 13–16)
INR PPP: 1 (ref 0.8–1.2)
LACTATE BLD-SCNC: 0.5 MMOL/L (ref 0.4–2)
LACTATE BLD-SCNC: 2 MMOL/L (ref 0.4–2)
LYMPHOCYTES # BLD AUTO: 17 % (ref 21–52)
LYMPHOCYTES # BLD: 1 K/UL (ref 0.9–3.6)
MCH RBC QN AUTO: 28.7 PG (ref 24–34)
MCHC RBC AUTO-ENTMCNC: 32.4 G/DL (ref 31–37)
MCV RBC AUTO: 88.7 FL (ref 74–97)
MONOCYTES # BLD: 0.3 K/UL (ref 0.05–1.2)
MONOCYTES NFR BLD AUTO: 6 % (ref 3–10)
NEUTS SEG # BLD: 4.2 K/UL (ref 1.8–8)
NEUTS SEG NFR BLD AUTO: 76 % (ref 40–73)
P-R INTERVAL, ECG05: 230 MS
PLATELET # BLD AUTO: 253 K/UL (ref 135–420)
PMV BLD AUTO: 9.8 FL (ref 9.2–11.8)
POTASSIUM SERPL-SCNC: 4.2 MMOL/L (ref 3.5–5.5)
PROT SERPL-MCNC: 7.7 G/DL (ref 6.4–8.2)
PROTHROMBIN TIME: 13.2 SEC (ref 11.5–15.2)
Q-T INTERVAL, ECG07: 396 MS
QRS DURATION, ECG06: 96 MS
QTC CALCULATION (BEZET), ECG08: 418 MS
RBC # BLD AUTO: 4.07 M/UL (ref 4.7–5.5)
SODIUM SERPL-SCNC: 135 MMOL/L (ref 136–145)
VENTRICULAR RATE, ECG03: 67 BPM
WBC # BLD AUTO: 5.7 K/UL (ref 4.6–13.2)

## 2017-02-20 PROCEDURE — 74011000258 HC RX REV CODE- 258: Performed by: INTERNAL MEDICINE

## 2017-02-20 PROCEDURE — 93005 ELECTROCARDIOGRAM TRACING: CPT

## 2017-02-20 PROCEDURE — 73630 X-RAY EXAM OF FOOT: CPT

## 2017-02-20 PROCEDURE — 83605 ASSAY OF LACTIC ACID: CPT

## 2017-02-20 PROCEDURE — 71010 XR CHEST PORT: CPT

## 2017-02-20 PROCEDURE — 85025 COMPLETE CBC W/AUTO DIFF WBC: CPT | Performed by: EMERGENCY MEDICINE

## 2017-02-20 PROCEDURE — 65660000000 HC RM CCU STEPDOWN

## 2017-02-20 PROCEDURE — 85610 PROTHROMBIN TIME: CPT | Performed by: EMERGENCY MEDICINE

## 2017-02-20 PROCEDURE — 80053 COMPREHEN METABOLIC PANEL: CPT | Performed by: EMERGENCY MEDICINE

## 2017-02-20 PROCEDURE — 74011250636 HC RX REV CODE- 250/636: Performed by: EMERGENCY MEDICINE

## 2017-02-20 PROCEDURE — 74011250636 HC RX REV CODE- 250/636: Performed by: INTERNAL MEDICINE

## 2017-02-20 PROCEDURE — 74011000258 HC RX REV CODE- 258

## 2017-02-20 PROCEDURE — 99284 EMERGENCY DEPT VISIT MOD MDM: CPT

## 2017-02-20 PROCEDURE — 87040 BLOOD CULTURE FOR BACTERIA: CPT | Performed by: EMERGENCY MEDICINE

## 2017-02-20 PROCEDURE — 85730 THROMBOPLASTIN TIME PARTIAL: CPT | Performed by: EMERGENCY MEDICINE

## 2017-02-20 RX ORDER — SODIUM CHLORIDE 900 MG/100ML
INJECTION INTRAVENOUS
Status: COMPLETED
Start: 2017-02-20 | End: 2017-02-20

## 2017-02-20 RX ORDER — HYDROMORPHONE HYDROCHLORIDE 1 MG/ML
0.5 INJECTION, SOLUTION INTRAMUSCULAR; INTRAVENOUS; SUBCUTANEOUS ONCE
Status: COMPLETED | OUTPATIENT
Start: 2017-02-20 | End: 2017-02-20

## 2017-02-20 RX ORDER — CEFTRIAXONE 1 G/1
1 INJECTION, POWDER, FOR SOLUTION INTRAMUSCULAR; INTRAVENOUS
Status: COMPLETED | OUTPATIENT
Start: 2017-02-20 | End: 2017-02-20

## 2017-02-20 RX ADMIN — CEFTRIAXONE SODIUM 1 G: 1 INJECTION, POWDER, FOR SOLUTION INTRAMUSCULAR; INTRAVENOUS at 17:51

## 2017-02-20 RX ADMIN — SODIUM CHLORIDE 1000 MG: 900 INJECTION, SOLUTION INTRAVENOUS at 17:51

## 2017-02-20 RX ADMIN — HYDROMORPHONE HYDROCHLORIDE 0.5 MG: 1 INJECTION, SOLUTION INTRAMUSCULAR; INTRAVENOUS; SUBCUTANEOUS at 21:34

## 2017-02-20 RX ADMIN — SODIUM CHLORIDE 100 ML: 900 INJECTION INTRAVENOUS at 17:51

## 2017-02-20 RX ADMIN — VANCOMYCIN HYDROCHLORIDE 500 MG: 500 INJECTION, POWDER, LYOPHILIZED, FOR SOLUTION INTRAVENOUS at 21:32

## 2017-02-20 NOTE — PROGRESS NOTES
Saw pt in ED. Sent by Podiatry for admission for infected left second toe. Pt with complicated vascular history and multiple revasc procedures. Discussed with pt can set up for angio during admission. Has known outflow segment suggestive of severe stenosis of his fem-fem bypass. Keep NPO after midnight if admitted and will discuss timing of procedures with Podiatry.      Palmira Solis  [phone]

## 2017-02-20 NOTE — IP AVS SNAPSHOT
Current Discharge Medication List  
  
Take these medications at their scheduled times Dose & Instructions Dispensing Information Comments Morning Noon Evening Bedtime  
 aspirin 81 mg chewable tablet Your next dose is: Today, Tomorrow Other:  ____________ Dose:  81 mg Take 1 Tab by mouth daily. Quantity:  30 Tab Refills:  0  
     
   
   
   
  
 clopidogrel 75 mg Tab Commonly known as:  PLAVIX Your next dose is: Today, Tomorrow Other:  ____________ Dose:  75 mg Take 1 Tab by mouth daily (with dinner). Quantity:  90 Tab Refills:  6  
     
   
   
   
  
 lovastatin 20 mg tablet Commonly known as:  MEVACOR Your next dose is: Today, Tomorrow Other:  ____________ Dose:  20 mg Take 1 Tab by mouth daily. Quantity:  90 Tab Refills:  3  
     
   
   
   
  
 multivitamin with iron tablet Your next dose is: Today, Tomorrow Other:  ____________ Dose:  1 Tab Take 1 Tab by mouth daily. Refills:  0  
     
   
   
   
  
 oxybutynin 5 mg tablet Commonly known as:  VEAXLUIV Your next dose is: Today, Tomorrow Other:  ____________ Dose:  5 mg Take 1 Tab by mouth three (3) times daily. Indications: BLADDER HYPERACTIVITY, URINARY URGENCY Quantity:  90 Tab Refills:  6 Take these medications as needed Dose & Instructions Dispensing Information Comments Morning Noon Evening Bedtime MIRALAX PO Your next dose is: Today, Tomorrow Other:  ____________ Take  by mouth as needed. Refills:  0 Take these medications as directed Dose & Instructions Dispensing Information Comments Morning Noon Evening Bedtime  
 amLODIPine 5 mg tablet Commonly known as:  Rosa Maria Garciaer Your next dose is: Today, Tomorrow Other:  ____________ TAKE ONE TABLET BY MOUTH ONCE DAILY Quantity:  90 Tab Refills:  3  
     
   
   
   
  
 isosorbide mononitrate ER 60 mg CR tablet Commonly known as:  IMDUR Your next dose is: Today, Tomorrow Other:  ____________  
   
   
 1 tablet each AM  
 Quantity:  90 Tab Refills:  3  
     
   
   
   
  
 levothyroxine 88 mcg tablet Commonly known as:  SYNTHROID Your next dose is: Today, Tomorrow Other:  ____________ TAKE ONE TABLET BY MOUTH ONCE DAILY BEFORE BREAKFAST Quantity:  90 Tab Refills:  3  
     
   
   
   
  
 oxyCODONE-acetaminophen 7.5-325 mg per tablet Commonly known as:  PERCOCET 7.5 Your next dose is: Today, Tomorrow Other:  ____________  
   
   
 1 tablet every six hours as needed for pain Quantity:  120 Tab Refills:  0  
     
   
   
   
  
 tamsulosin 0.4 mg capsule Commonly known as:  FLOMAX Your next dose is: Today, Tomorrow Other:  ____________  
   
   
 1 tablet daily at supper Quantity:  30 Cap Refills:  6 ASK your doctor about these medications Dose & Instructions Dispensing Information Comments Morning Noon Evening Bedtime  
 cephALEXin 500 mg capsule Commonly known as:  Oswaldo Calvin Ask about: Should I take this medication? Your next dose is: Today, Tomorrow Other:  ____________ Dose:  500 mg Take 1 Cap by mouth four (4) times daily for 7 days. Quantity:  28 Cap Refills:  0  
     
   
   
   
  
 trimethoprim-sulfamethoxazole 160-800 mg per tablet Commonly known as:  BACTRIM DS Ask about: Should I take this medication? Your next dose is: Today, Tomorrow Other:  ____________ Dose:  1 Tab Take 1 Tab by mouth two (2) times a day for 7 days. Quantity:  14 Tab Refills:  0

## 2017-02-20 NOTE — IP AVS SNAPSHOT
Essie Espinoza 
 
 
 [address] [facility] [address] Patient: Sanrda Patel MRN: [id number] CIT:[id number] You are allergic to the following Allergen Reactions Latex Hives Tape (Adhesive) Rash Recent Documentation Height  
  
  
  
  
  
 1.829 m Unresulted Labs Order Current Status CULTURE, ANAEROBIC Preliminary result CULTURE, ANAEROBIC Preliminary result CULTURE, TISSUE W GRAM STAIN Preliminary result Emergency Contacts Name Discharge Info Relation Home Work Mobile [facility] - [facility]IDENT DRUG TREATMENT (WOMEN) DISCHARGE CAREGIVER [3] Spouse [3] [phone] About your hospitalization You were admitted on:  February 20, 2017 You last received care in the:  [facility] [address] You were discharged on:  February 28, 2017 Unit phone number:  [phone] Why you were hospitalized Your primary diagnosis was:  Not on File Your diagnoses also included:  Osteomyelitis (Hcc) Providers Seen During Your Hospitalizations Provider Role Specialty Primary office phone Riley Og DO Attending Provider Emergency Medicine [phone] Santi Jackson DPM Attending Provider Podiatry [phone] Your Primary Care Physician (PCP) Primary Care Physician Office Phone Office Fax [phone] [address] [phone] Follow-up Information Follow up With Details Comments Contact Info Simran Zelaya MD On 3/1/2017 @ 10:45 (Ana Phan) [address] [place] [address] 
[phone] Your Appointments Wednesday March 01, 2017 10:45 AM [facility] HOSPITAL FOLLOW-UP with Marce Price NP  
[facility] [place] [facility] [address], [address] [place] [address] [phone] Current Discharge Medication List  
  
CONTINUE these medications which have CHANGED Dose & Instructions Dispensing Information Comments Morning Noon Evening Bedtime  
 oxybutynin 5 mg tablet Commonly known as:  CMSWLCMP What changed:  when to take this Your next dose is: Today, Tomorrow Other:  _________ Dose:  5 mg Take 1 Tab by mouth three (3) times daily. Indications: BLADDER HYPERACTIVITY, URINARY URGENCY Quantity:  90 Tab Refills:  6 CONTINUE these medications which have NOT CHANGED Dose & Instructions Dispensing Information Comments Morning Noon Evening Bedtime  
 amLODIPine 5 mg tablet Commonly known as:  Willem De La O Your next dose is: Today, Tomorrow Other:  _________ TAKE ONE TABLET BY MOUTH ONCE DAILY Quantity:  90 Tab Refills:  3  
     
   
   
   
  
 aspirin 81 mg chewable tablet Your next dose is: Today, Tomorrow Other:  _________ Dose:  81 mg Take 1 Tab by mouth daily. Quantity:  30 Tab Refills:  0  
     
   
   
   
  
 clopidogrel 75 mg Tab Commonly known as:  PLAVIX Your next dose is: Today, Tomorrow Other:  _________ Dose:  75 mg Take 1 Tab by mouth daily (with dinner). Quantity:  90 Tab Refills:  6  
     
   
   
   
  
 isosorbide mononitrate ER 60 mg CR tablet Commonly known as:  IMDUR Your next dose is: Today, Tomorrow Other:  _________  
   
   
 1 tablet each AM  
 Quantity:  90 Tab Refills:  3  
     
   
   
   
  
 levothyroxine 88 mcg tablet Commonly known as:  SYNTHROID Your next dose is: Today, Tomorrow Other:  _________ TAKE ONE TABLET BY MOUTH ONCE DAILY BEFORE BREAKFAST Quantity:  90 Tab Refills:  3  
     
   
   
   
  
 lovastatin 20 mg tablet Commonly known as:  MEVACOR Your next dose is: Today, Tomorrow Other:  _________ Dose:  20 mg Take 1 Tab by mouth daily. Quantity:  90 Tab Refills:  3 MIRALAX PO Your next dose is: Today, Tomorrow Other:  _________ Take  by mouth as needed. Refills:  0  
     
   
   
   
  
 multivitamin with iron tablet Your next dose is: Today, Tomorrow Other:  _________ Dose:  1 Tab Take 1 Tab by mouth daily. Refills:  0  
     
   
   
   
  
 oxyCODONE-acetaminophen 7.5-325 mg per tablet Commonly known as:  PERCOCET 7.5 Your next dose is: Today, Tomorrow Other:  _________  
   
   
 1 tablet every six hours as needed for pain Quantity:  120 Tab Refills:  0  
     
   
   
   
  
 tamsulosin 0.4 mg capsule Commonly known as:  FLOMAX Your next dose is: Today, Tomorrow Other:  _________  
   
   
 1 tablet daily at supper Quantity:  30 Cap Refills:  6 ASK your doctor about these medications Dose & Instructions Dispensing Information Comments Morning Noon Evening Bedtime  
 cephALEXin 500 mg capsule Commonly known as:  Alannah Slaughter Ask about: Should I take this medication? Your next dose is: Today, Tomorrow Other:  _________ Dose:  500 mg Take 1 Cap by mouth four (4) times daily for 7 days. Quantity:  28 Cap Refills:  0  
     
   
   
   
  
 trimethoprim-sulfamethoxazole 160-800 mg per tablet Commonly known as:  BACTRIM DS Ask about: Should I take this medication? Your next dose is: Today, Tomorrow Other:  _________ Dose:  1 Tab Take 1 Tab by mouth two (2) times a day for 7 days. Quantity:  14 Tab Refills:  0 Discharge Instructions Osteomyelitis: Care Instructions Your Care Instructions Osteomyelitis (say \"me-igza-np-cb-zv-DO-tus\") is a bone infection. It is caused by bacteria. The bacteria can infect the bone where it has been injured, or they can be carried through the blood from another area in the body. Osteomyelitis can be a short- or long-term problem. It is treated with antibiotics. You may get the antibiotics as pills or through a needle in a vein (IV). You will probably get treatment in the hospital at first. The type of treatment depends on the type of bacteria causing the infection, the bones affected, and how bad the infection is. Sometimes people need surgery to drain pus from bone or to fix damaged bone. Short-term osteomyelitis that is treated right away usually can be cured. But the long-term form sometimes comes back after treatment. You can help your chances of stopping the infection by taking your medicines as directed. Follow-up care is a key part of your treatment and safety. Be sure to make and go to all appointments, and call your doctor if you are having problems. It's also a good idea to know your test results and keep a list of the medicines you take. How can you care for yourself at home? · Take your antibiotics as directed. Do not stop taking them just because you feel better. You need to take the full course of antibiotics. · Take pain medicines exactly as directed. ¨ If the doctor gave you a prescription medicine for pain, take it as prescribed. ¨ If you are not taking a prescription pain medicine, ask your doctor if you can take an over-the-counter medicine. · Do mild exercise and stretching if your doctor says it is okay. This can help keep your bones and muscles healthy. Avoid strenuous work or exercise until your doctor says you can do it. · Consider physical therapy if your doctor suggests it. Physical therapy may help you have a normal range of movement. · Do not smoke. Smoking can slow healing of the infection. If you need help quitting, talk to your doctor about stop-smoking programs and medicines. These can increase your chances of quitting for good. When should you call for help? Call 911 anytime you think you may need emergency care. For example, call if: · You have severe bone pain. Call your doctor now or seek immediate medical care if: 
· You continue to have bone pain. · You have signs of infection, such as: 
¨ Increased pain, swelling, warmth, or redness. ¨ Red streaks leading from a wound. ¨ Pus draining from a wound. ¨ A fever. Watch closely for changes in your health, and be sure to contact your doctor if: 
· You do not get better as expected. Where can you learn more? Go to http://robert-leno.info/. Enter V495 in the search box to learn more about \"Osteomyelitis: Care Instructions. \" Current as of: June 4, 2016 Content Version: 11.1 © 2123-5738 [employer]. Care instructions adapted under license by [employer] (which disclaims liability or warranty for this information). If you have questions about a medical condition or this instruction, always ask your healthcare professional. [address] any warranty or liability for your use of this information. Discharge Orders None [employer] Announcement We are excited to announce that we are making your provider's discharge notes available to you in [employer]. You will see these notes when they are completed and signed by the physician that discharged you from your recent hospital stay. If you have any questions or concerns about any information you see in [employer], please call the Health Information Department where you were seen or reach out to your Primary Care Provider for more information about your plan of care. General Information Please provide this summary of care documentation to your next provider. Patient Signature:  ____________________________________________________________ Date:  ____________________________________________________________  
  
Urszula Turcios Provider Signature:  ____________________________________________________________ Date:  ____________________________________________________________

## 2017-02-20 NOTE — IP AVS SNAPSHOT
Summary of Care Report The Summary of Care report has been created to help improve care coordination. Users with access to [employer] or [address] (Web-based application) may access additional patient information including the Discharge Summary. If you are not currently a [address] user and need more information, please call the number listed below in the [place] [address] section and ask to be connected with Medical Records. Facility Information Name Address Phone [address] [place] [address] [address] [facility] [id number] [phone] Patient Information Patient Name Sex ROBERTO Aquino ([id number]) Male 1935 Discharge Information Admitting Provider Service Area Unit Kely Valencia, [facility] / [address] / [phone] Discharge Provider Discharge Date/Time Discharge Disposition Destination (none) 2017 (Pending) AHR (none) Patient Language Language ENGLISH [13] Problem List as of 2017  Date Reviewed: 2017 Codes Priority Class Noted - Resolved Acetabular fracture ([facility] 75.) ICD-10-CM: L15.723T ICD-9-CM: 808.0   2015 - Present Fracture of neck of femur ([facility] ICD-10-CM: N02.557M ICD-9-CM: 820.8   10/7/2015 - Present Neuropathy ICD-10-CM: G62.9 ICD-9-CM: 355.9   2015 - Present Carotid artery disease (HCC) (Chronic) ICD-10-CM: I77.9 ICD-9-CM: 447.9   3/8/2016 - Present Ischemic rest pain of lower extremity ([facility] ICD-10-CM: I73.9 ICD-9-CM: 443.9   3/18/2016 - Present Hypotension due to blood loss ICD-10-CM: I95.89 ICD-9-CM: 458.8   2016 - Present  Overview Signed 4/15/2016  1:46 PM by Kortney García MD  
 Attributed to lower GI bleed History of lower GI bleeding ICD-10-CM: Z87.19 ICD-9-CM: V12.79   4/9/2016 - Present Anemia due to acute blood loss ICD-10-CM: D62 
ICD-9-CM: 285.1   4/9/2016 - Present Overview Signed 4/15/2016  1:45 PM by Estrella Eng MD  
  Attributed to lower GI bleed Generalized weakness ICD-10-CM: R53.1 ICD-9-CM: 780.79   4/9/2016 - Present Impaired mobility and ADLs ICD-10-CM: Z74.09 
ICD-9-CM: 799.89   4/9/2016 - Present Bleeding risk due to aspirin ICD-10-CM: Z79.82 ICD-9-CM: V49.89   4/9/2016 - Present Overview Signed 4/15/2016  2:13 PM by Estrella Eng MD  
  Aspirin + Clopidogrel Non-ST elevation myocardial infarction (NSTEMI), subsequent episode of care [facility]) ICD-10-CM: I21.4 ICD-9-CM: 410.72   4/9/2016 - Present History of malignant neoplasm of anus ICD-10-CM: Z85.048 ICD-9-CM: V10.06   1/1/2013 - Present Overview Signed 4/15/2016  1:48 PM by Estrella Eng MD  
  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  
  Calculated GFR equivalent to that of CKD stage 2 = 60-89 ml/min Benign hypertension without congestive heart failure (Chronic) ICD-10-CM: I10 
ICD-9-CM: 401.1   Unknown - Present Diastolic dysfunction without heart failure (Chronic) ICD-10-CM: I51.9 ICD-9-CM: 429.9   Unknown - Present History of MRSA infection ICD-10-CM: Z86.14 
ICD-9-CM: V12.04   12/28/2015 - Present Overview Signed 4/15/2016  1:51 PM by Estrella Eng MD  
  Culture of surgical wound from left great toe (12/28/2015): POSITIVE for MRSA Need for isolation ICD-10-CM: Z41.8 ICD-9-CM: V07.0   4/9/2016 - Present  Overview Signed 4/15/2016  1:52 PM by Estrella Eng MD  
 Contact Isolation for history of MRSA infection (12/28/2015) Dyslipidemia (Chronic) ICD-10-CM: G60.3 ICD-9-CM: 272.4   Unknown - Present Peripheral vascular disease (HCC) (Chronic) ICD-10-CM: I73.9 ICD-9-CM: 443.9   Unknown - Present Spinal stenosis of lumbar region (Chronic) ICD-10-CM: M48.06 
ICD-9-CM: 724.02   Unknown - Present Chronic anemia (Chronic) ICD-10-CM: D64.9 ICD-9-CM: 880. 9   Unknown - Present Hypothyroidism (Chronic) ICD-10-CM: E03.9 ICD-9-CM: 244.9   Unknown - Present History of peptic ulcer disease (Chronic) ICD-10-CM: Z87.11 ICD-9-CM: V12.71   Unknown - Present History of left-sided carotid endarterectomy ICD-10-CM: Z98.890 ICD-9-CM: V45.89   Unknown - Present Acute encephalopathy ICD-10-CM: G93.40 ICD-9-CM: 348.30   4/9/2016 - Present Acute kidney injury ([facility] ICD-10-CM: N17.9 ICD-9-CM: 584.9   4/9/2016 - Present Aftercare following surgery of the musculoskeletal system ICD-10-CM: Z47.89 ICD-9-CM: V58.78   3/24/2016 - Present Overview Signed 4/15/2016  2:19 PM by Miguelito Kruse MD  
  S/P [place] [address] placement on right leg (4/13/2016); History of right lower extremity 4-compartment fasciotomy (3/18/2016) with initial [place] [address] placed on 3/24/2016 Vitamin D insufficiency (Chronic) ICD-10-CM: E55.9 ICD-9-CM: 268.9   4/16/2016 - Present Overview Signed 4/18/2016  3:29 PM by Miguelito Kruse MD  
  Vitamin D 25-Hydroxy (4/16/2016) = 25.6 Advance care planning ICD-10-CM: Z71.89 ICD-9-CM: V65.49   5/11/2016 - Present Overview Signed 5/11/2016 10:12 AM by Robb Fernandez MD  
  The patient was advised and counseled regarding advanced directives. The patient was provided with an information packed and an advanced directive form. Open wound of left heel ICD-10-CM: K13.534D ICD-9-CM: 892.0   7/13/2016 - Present Open wound of right lower leg ICD-10-CM: F45.407M ICD-9-CM: 891.0   7/13/2016 - Present Essential hypertension ICD-10-CM: I10 
ICD-9-CM: 401.9   11/21/2016 - Present Osteomyelitis ([facility] ICD-10-CM: M86.9 ICD-9-CM: 730.20   2/20/2017 - Present You are allergic to the following Allergen Reactions Latex Hives Tape (Adhesive) Rash Current Discharge Medication List  
  
CONTINUE these medications which have CHANGED Dose & Instructions Dispensing Information Comments  
 oxybutynin 5 mg tablet Commonly known as:  BZUEPZTO What changed:  when to take this Dose:  5 mg Take 1 Tab by mouth three (3) times daily. Indications: BLADDER HYPERACTIVITY, URINARY URGENCY Quantity:  90 Tab Refills:  6 CONTINUE these medications which have NOT CHANGED Dose & Instructions Dispensing Information Comments  
 amLODIPine 5 mg tablet Commonly known as:  Allan Cater TAKE ONE TABLET BY MOUTH ONCE DAILY Quantity:  90 Tab Refills:  3  
   
 aspirin 81 mg chewable tablet Dose:  81 mg Take 1 Tab by mouth daily. Quantity:  30 Tab Refills:  0  
   
 clopidogrel 75 mg Tab Commonly known as:  PLAVIX Dose:  75 mg Take 1 Tab by mouth daily (with dinner). Quantity:  90 Tab Refills:  6  
   
 isosorbide mononitrate ER 60 mg CR tablet Commonly known as:  IMDUR  
 1 tablet each AM  
 Quantity:  90 Tab Refills:  3  
   
 levothyroxine 88 mcg tablet Commonly known as:  SYNTHROID  
 TAKE ONE TABLET BY MOUTH ONCE DAILY BEFORE BREAKFAST Quantity:  90 Tab Refills:  3  
   
 lovastatin 20 mg tablet Commonly known as:  MEVACOR Dose:  20 mg Take 1 Tab by mouth daily. Quantity:  90 Tab Refills:  3 MIRALAX PO Take  by mouth as needed. Refills:  0  
   
 multivitamin with iron tablet Dose:  1 Tab Take 1 Tab by mouth daily. Refills:  0  
   
 oxyCODONE-acetaminophen 7.5-325 mg per tablet Commonly known as:  PERCOCET 7.5  
 1 tablet every six hours as needed for pain Quantity:  120 Tab Refills:  0 tamsulosin 0.4 mg capsule Commonly known as:  FLOMAX  
 1 tablet daily at supper Quantity:  30 Cap Refills:  6 ASK your doctor about these medications Dose & Instructions Dispensing Information Comments  
 cephALEXin 500 mg capsule Commonly known as:  Dona Nagy Ask about: Should I take this medication? Dose:  500 mg Take 1 Cap by mouth four (4) times daily for 7 days. Quantity:  28 Cap Refills:  0  
   
 trimethoprim-sulfamethoxazole 160-800 mg per tablet Commonly known as:  BACTRIM DS Ask about: Should I take this medication? Dose:  1 Tab Take 1 Tab by mouth two (2) times a day for 7 days. Quantity:  14 Tab Refills:  0 Current Immunizations Name Date Influenza High Dose Vaccine PF 10/18/2016 Influenza Vaccine 10/25/2013 Influenza Vaccine PF 10/27/2015 Pneumococcal Polysaccharide (PPSV-23) 3/29/2016 Surgery Information ID Date/Time Status Primary Surgeon All Procedures Location [id number] 2/24/2017 [address] [place] [address], [facility] AMPUTATION LEFT SECOND TOE SO [facility] MAIN OR Follow-up Information Follow up With Details Comments Contact Info Chio Flannery MD On 3/1/2017 @ 10:45 (Lisa Bernal) [facility] [address] [place] [id number] [phone] Discharge Instructions Osteomyelitis: Care Instructions Your Care Instructions Osteomyelitis (say \"yy-dmxo-lr-py-ul-TN-tus\") is a bone infection. It is caused by bacteria. The bacteria can infect the bone where it has been injured, or they can be carried through the blood from another area in the body. Osteomyelitis can be a short- or long-term problem. It is treated with antibiotics. You may get the antibiotics as pills or through a needle in a vein (IV).  You will probably get treatment in the hospital at first. The type of treatment depends on the type of bacteria causing the infection, the bones affected, and how bad the infection is. Sometimes people need surgery to drain pus from bone or to fix damaged bone. Short-term osteomyelitis that is treated right away usually can be cured. But the long-term form sometimes comes back after treatment. You can help your chances of stopping the infection by taking your medicines as directed. Follow-up care is a key part of your treatment and safety. Be sure to make and go to all appointments, and call your doctor if you are having problems. It's also a good idea to know your test results and keep a list of the medicines you take. How can you care for yourself at home? · Take your antibiotics as directed. Do not stop taking them just because you feel better. You need to take the full course of antibiotics. · Take pain medicines exactly as directed. ¨ If the doctor gave you a prescription medicine for pain, take it as prescribed. ¨ If you are not taking a prescription pain medicine, ask your doctor if you can take an over-the-counter medicine. · Do mild exercise and stretching if your doctor says it is okay. This can help keep your bones and muscles healthy. Avoid strenuous work or exercise until your doctor says you can do it. · Consider physical therapy if your doctor suggests it. Physical therapy may help you have a normal range of movement. · Do not smoke. Smoking can slow healing of the infection. If you need help quitting, talk to your doctor about stop-smoking programs and medicines. These can increase your chances of quitting for good. When should you call for help? Call 911 anytime you think you may need emergency care. For example, call if: 
· You have severe bone pain. Call your doctor now or seek immediate medical care if: 
· You continue to have bone pain. · You have signs of infection, such as: 
¨ Increased pain, swelling, warmth, or redness. ¨ Red streaks leading from a wound. ¨ Pus draining from a wound. ¨ A fever. Watch closely for changes in your health, and be sure to contact your doctor if: 
· You do not get better as expected. Where can you learn more? Go to http://robert-leno.info/. Enter Y318 in the search box to learn more about \"Osteomyelitis: Care Instructions. \" Current as of: June 4, 2016 Content Version: 11.1 © 5007-8288 [employer]. Care instructions adapted under license by [employer] (which disclaims liability or warranty for this information). If you have questions about a medical condition or this instruction, always ask your healthcare professional. [place] [address] any warranty or liability for your use of this information. Chart Review Routing History Recipient Method Report Sent By David Tran MD  
Fax: [phone] Phone: [phone] Fax Note Review Christiano Pritchett [[id number]] 3/3/2014 11:27 AM 03/03/2014 Cody Ybarra MD  
Phone: [phone] In [employer] Routed [employer], [address] [place] [address] [[id number]] 3/11/2015  3:04 PM 03/11/2015 Esther Tran MD  
Fax: [phone] Phone: [phone] Fax IP Auto Routed [employer], [address] [place] [address] [[id number]] 3/11/2015  3:04 PM 03/11/2015 Esther Tran MD  
Fax: [phone] Phone: [phone] Fax Note Review Christiano Pritchett [[id number]] 3/20/2015  2:19 PM 03/20/2015 Esther Tran MD  
Fax: [phone] Phone: [phone] Fax Note Review Christiano Pritchett [[id number]] 6/26/2015  7:17 AM 06/26/2015 Cody Ybarra MD  
Phone: [phone] In [employer] Routed [employer], [address] [place] [address] [[id number]] 12/8/2015  9:28 AM 12/08/2015 Esther Tran MD  
Phone: [phone] In [employer] Routed [employer] [place], [address] [place] [address] [[id number]] 12/8/2015  9:28 AM 12/08/2015 Esther Tran MD  
Phone: [phone] In [employer] IP Auto Routed Trans Pura Saucedo MD [[id number]] 12/12/2015  6:25 PM 12/12/2015 Jose Enrique Smith MD  
Phone: [phone] In [employer] IP Auto Routed Trans Moiz Garg MD [[id number]] 12/12/2015  6:25 PM 12/12/2015 Moiz Garg MD  
Phone: [phone] In [employer] IP Auto Routed Trans Moiz Garg MD [[id number]] 12/12/2015  6:25 PM 12/12/2015 MCR Fax: [phone] [facility] Debbie Garciasidney [[id number]] 12/16/2015  6:04 PM 12/16/2015 Hoda Vazquez DPM  
Fax: [phone] Phone: [phone] Fax IP Auto Routed [place], [place] [[id number]] 1/8/2016  1:55 PM 01/08/2016 Moiz Garg MD  
Phone: [phone] In [employer] Routed [place], [place] [[id number]] 1/8/2016  1:55 PM 01/08/2016 Chencho Gupta MD  
Phone: [phone] In [employer] Routed [place], [place] [[id number]] 1/8/2016  1:55 PM 01/08/2016 Rose Stanton MD  
Phone: [phone] In [employer] IP Auto Routed Ratna De Leon MD [[id number]] 3/17/2016  4:06 PM 03/17/2016 Chencho Gupta MD  
Phone: [phone] In [employer] IP Auto Routed Ratna De Leon MD [[id number]] 3/17/2016  4:06 PM 03/17/2016 Chelle Mena MD  
Phone: [phone] In [employer] Routed [employer], [place] [[id number]] 3/21/2016  8:11 AM 03/21/2016 Rose Stanton MD  
Phone: [phone] In [employer] Routed [employer], [place] [[id number]] 3/21/2016  8:11 AM 03/21/2016 Chencho Gupta MD  
Phone: [phone] In [employer] Routed [employer], [place] [[id number]] 3/21/2016  8:11 AM 03/21/2016 Rose Stanton MD  
Phone: [phone] In [employer] IP Auto Routed Ratna De Leon MD [[id number]] 3/21/2016  8:54 AM 03/21/2016 Chencho Gupta MD  
Phone: [phone] In [employer] IP Auto Routed Ratna De Leon MD [[id number]] 3/21/2016  8:54 AM 03/21/2016 Chelle Mena MD  
Phone: [phone] In [employer] Routed [employer], [place] [[id number]] 3/25/2016  9:58 AM 03/25/2016 Rose Stanton MD  
Phone: [phone] In [employer] Routed [place] [address] [place] [address] [[id number]] 3/25/2016  9:58 AM 03/25/2016 Luis E Méndez MD  
Phone: [phone] In [employer] Routed [place] [address] [place] [address] [[id number]] 3/25/2016  9:58 AM 03/25/2016 Bambi Dumont MD  
Fax: [phone] Phone: [phone] Fax Note Review Bambi Dumont MD [[id number]] 4/10/2016  6:08 PM 01/12/2015 Luis E Méndez MD  
Phone: [phone] In [employer] IP Auto Routed Mignon Clifford MD [[id number]] 4/21/2016  8:59 AM 04/21/2016 Robson Anguiano MD  
Phone: [phone] In [employer] Auto Routed Mignon Clifford MD [[id number]] 4/21/2016  8:59 AM 04/21/2016 Luis E Méndez MD  
Phone: [phone] In [employer] IP Auto Routed Wilfred De Guzman MD [[id number]] 4/21/2016  2:49 PM 04/21/2016 Johnson Starr MD  
Phone: [phone] In [employer] IP Auto Routed Wilfred De Guzman MD [[id number]] 4/21/2016  2:49 PM 04/21/2016 Anahi Cruz MD  
Phone: [phone] In [employer] IP Auto Routed Wilfred De Guzman MD [[id number]] 4/21/2016  2:49 PM 04/21/2016 Lizette Maxwell MD  
Phone: [phone] In Basket IP Auto Routed Wilfred De Guzman MD [[id number]] 4/21/2016  2:49 PM 04/21/2016 Robson Anguiano MD  
Phone: [phone] In [employer] IP Auto Routed Wilfred De Guzman MD [[id number]] 4/21/2016  2:49 PM 04/21/2016 Demetrice King MD  
Phone: [phone] In [employer] IP Auto Routed Keara Vaughn MD [[id number]] 7/8/2016  8:14 AM 07/08/2016 Luis E Méndez MD  
Phone: [phone] In [employer] IP Auto Routed Keara Vaughn MD [[id number]] 7/8/2016  8:14 AM 07/08/2016 Demetrice King MD  
Phone: [phone] In [employer] IP Auto Routed Keara Vaughn MD [[id number]] 7/30/2016 12:03 AM 07/30/2016 Luis E Méndez MD  
Phone: [phone] In [employer] IP Auto Routed Keara Vaughn MD [[id number]] 7/30/2016 12:03 AM 07/30/2016 Kirill Ferrari DPM  
Fax: [phone] Phone: [phone] Fax IP Auto Routed Ely Lucas [[id number]] 2/27/2017  8:37 AM 02/27/2017
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
## 2017-02-21 LAB
ANION GAP BLD CALC-SCNC: 6 MMOL/L (ref 3–18)
BUN SERPL-MCNC: 15 MG/DL (ref 7–18)
BUN/CREAT SERPL: 14 (ref 12–20)
CALCIUM SERPL-MCNC: 8.6 MG/DL (ref 8.5–10.1)
CHLORIDE SERPL-SCNC: 106 MMOL/L (ref 100–108)
CO2 SERPL-SCNC: 28 MMOL/L (ref 21–32)
CREAT SERPL-MCNC: 1.11 MG/DL (ref 0.6–1.3)
GLUCOSE SERPL-MCNC: 99 MG/DL (ref 74–99)
POTASSIUM SERPL-SCNC: 4.4 MMOL/L (ref 3.5–5.5)
SODIUM SERPL-SCNC: 140 MMOL/L (ref 136–145)

## 2017-02-21 PROCEDURE — 76937 US GUIDE VASCULAR ACCESS: CPT

## 2017-02-21 PROCEDURE — 75625 CONTRAST EXAM ABDOMINL AORTA: CPT

## 2017-02-21 PROCEDURE — 74011000250 HC RX REV CODE- 250: Performed by: SURGERY

## 2017-02-21 PROCEDURE — 74011250636 HC RX REV CODE- 250/636: Performed by: INTERNAL MEDICINE

## 2017-02-21 PROCEDURE — C1874 STENT, COATED/COV W/DEL SYS: HCPCS

## 2017-02-21 PROCEDURE — 77030020643 HC SYR ANGI PWR INJ COVD -A

## 2017-02-21 PROCEDURE — B41D1ZZ FLUOROSCOPY OF AORTA AND BILATERAL LOWER EXTREMITY ARTERIES USING LOW OSMOLAR CONTRAST: ICD-10-PCS | Performed by: SURGERY

## 2017-02-21 PROCEDURE — 65660000000 HC RM CCU STEPDOWN

## 2017-02-21 PROCEDURE — 047J3ZZ DILATION OF LEFT EXTERNAL ILIAC ARTERY, PERCUTANEOUS APPROACH: ICD-10-PCS | Performed by: SURGERY

## 2017-02-21 PROCEDURE — 74011636320 HC RX REV CODE- 636/320: Performed by: SURGERY

## 2017-02-21 PROCEDURE — 77030011256 HC DRSG MEPILEX <16IN NO BORD MOLN -A

## 2017-02-21 PROCEDURE — 047L3DZ DILATION OF LEFT FEMORAL ARTERY WITH INTRALUMINAL DEVICE, PERCUTANEOUS APPROACH: ICD-10-PCS | Performed by: SURGERY

## 2017-02-21 PROCEDURE — 37220 HC PTA ILIAC INIT: CPT

## 2017-02-21 PROCEDURE — 99152 MOD SED SAME PHYS/QHP 5/>YRS: CPT

## 2017-02-21 PROCEDURE — C1725 CATH, TRANSLUMIN NON-LASER: HCPCS

## 2017-02-21 PROCEDURE — 77030004530 HC CATH ANGI DX IMGR BSC -A

## 2017-02-21 PROCEDURE — 74011250637 HC RX REV CODE- 250/637: Performed by: SURGERY

## 2017-02-21 PROCEDURE — 80048 BASIC METABOLIC PNL TOTAL CA: CPT | Performed by: SURGERY

## 2017-02-21 PROCEDURE — C1894 INTRO/SHEATH, NON-LASER: HCPCS

## 2017-02-21 PROCEDURE — 74011250637 HC RX REV CODE- 250/637: Performed by: INTERNAL MEDICINE

## 2017-02-21 PROCEDURE — 75716 ARTERY X-RAYS ARMS/LEGS: CPT

## 2017-02-21 PROCEDURE — 65270000029 HC RM PRIVATE

## 2017-02-21 PROCEDURE — 99153 MOD SED SAME PHYS/QHP EA: CPT

## 2017-02-21 PROCEDURE — C1760 CLOSURE DEV, VASC: HCPCS

## 2017-02-21 PROCEDURE — 74011000258 HC RX REV CODE- 258: Performed by: PHYSICIAN ASSISTANT

## 2017-02-21 PROCEDURE — 77030013515 HC DEV INFL ANGI BSC -B

## 2017-02-21 PROCEDURE — 74011000258 HC RX REV CODE- 258: Performed by: INTERNAL MEDICINE

## 2017-02-21 PROCEDURE — 37226 HC PLC STNT FEMPOP UNI +/-PTA: CPT

## 2017-02-21 PROCEDURE — C1769 GUIDE WIRE: HCPCS

## 2017-02-21 PROCEDURE — 74011250636 HC RX REV CODE- 250/636: Performed by: SURGERY

## 2017-02-21 RX ORDER — MIDAZOLAM HYDROCHLORIDE 1 MG/ML
.5-5 INJECTION, SOLUTION INTRAMUSCULAR; INTRAVENOUS
Status: DISCONTINUED | OUTPATIENT
Start: 2017-02-21 | End: 2017-02-21

## 2017-02-21 RX ORDER — CLOPIDOGREL BISULFATE 75 MG/1
75 TABLET ORAL DAILY
Status: DISCONTINUED | OUTPATIENT
Start: 2017-02-21 | End: 2017-02-28 | Stop reason: HOSPADM

## 2017-02-21 RX ORDER — ACETAMINOPHEN 325 MG/1
650 TABLET ORAL
Status: DISCONTINUED | OUTPATIENT
Start: 2017-02-21 | End: 2017-02-28 | Stop reason: HOSPADM

## 2017-02-21 RX ORDER — HEPARIN SODIUM 200 [USP'U]/100ML
500 INJECTION, SOLUTION INTRAVENOUS ONCE
Status: DISCONTINUED | OUTPATIENT
Start: 2017-02-21 | End: 2017-02-21 | Stop reason: HOSPADM

## 2017-02-21 RX ORDER — FENTANYL CITRATE 50 UG/ML
12.5-5 INJECTION, SOLUTION INTRAMUSCULAR; INTRAVENOUS
Status: DISCONTINUED | OUTPATIENT
Start: 2017-02-21 | End: 2017-02-21 | Stop reason: HOSPADM

## 2017-02-21 RX ORDER — OXYCODONE AND ACETAMINOPHEN 7.5; 325 MG/1; MG/1
1 TABLET ORAL
Status: DISCONTINUED | OUTPATIENT
Start: 2017-02-21 | End: 2017-02-21 | Stop reason: SDUPTHER

## 2017-02-21 RX ORDER — VERAPAMIL HYDROCHLORIDE 2.5 MG/ML
2.5-5 INJECTION, SOLUTION INTRAVENOUS ONCE
Status: DISCONTINUED | OUTPATIENT
Start: 2017-02-21 | End: 2017-02-21 | Stop reason: SDUPTHER

## 2017-02-21 RX ORDER — HEPARIN SODIUM 1000 [USP'U]/ML
1000-10000 INJECTION, SOLUTION INTRAVENOUS; SUBCUTANEOUS
Status: DISCONTINUED | OUTPATIENT
Start: 2017-02-21 | End: 2017-02-21 | Stop reason: HOSPADM

## 2017-02-21 RX ORDER — LIDOCAINE HYDROCHLORIDE 10 MG/ML
1-60 INJECTION, SOLUTION EPIDURAL; INFILTRATION; INTRACAUDAL; PERINEURAL
Status: DISCONTINUED | OUTPATIENT
Start: 2017-02-21 | End: 2017-02-21 | Stop reason: HOSPADM

## 2017-02-21 RX ORDER — OXYCODONE AND ACETAMINOPHEN 5; 325 MG/1; MG/1
1 TABLET ORAL
Status: DISCONTINUED | OUTPATIENT
Start: 2017-02-21 | End: 2017-02-28 | Stop reason: HOSPADM

## 2017-02-21 RX ORDER — AMLODIPINE BESYLATE 5 MG/1
5 TABLET ORAL DAILY
Status: DISCONTINUED | OUTPATIENT
Start: 2017-02-21 | End: 2017-02-28 | Stop reason: HOSPADM

## 2017-02-21 RX ORDER — LEVOTHYROXINE SODIUM 88 UG/1
88 TABLET ORAL
Status: DISCONTINUED | OUTPATIENT
Start: 2017-02-21 | End: 2017-02-28 | Stop reason: HOSPADM

## 2017-02-21 RX ORDER — HEPARIN SODIUM 200 [USP'U]/100ML
500 INJECTION, SOLUTION INTRAVENOUS
Status: DISCONTINUED | OUTPATIENT
Start: 2017-02-21 | End: 2017-02-21

## 2017-02-21 RX ORDER — ISOSORBIDE MONONITRATE 30 MG/1
60 TABLET, EXTENDED RELEASE ORAL DAILY
Status: DISCONTINUED | OUTPATIENT
Start: 2017-02-21 | End: 2017-02-28 | Stop reason: HOSPADM

## 2017-02-21 RX ORDER — SODIUM CHLORIDE 450 MG/100ML
75 INJECTION, SOLUTION INTRAVENOUS CONTINUOUS
Status: DISCONTINUED | OUTPATIENT
Start: 2017-02-21 | End: 2017-02-21

## 2017-02-21 RX ORDER — FENTANYL CITRATE 50 UG/ML
12.5-1 INJECTION, SOLUTION INTRAMUSCULAR; INTRAVENOUS
Status: DISCONTINUED | OUTPATIENT
Start: 2017-02-21 | End: 2017-02-21

## 2017-02-21 RX ORDER — HEPARIN SODIUM 1000 [USP'U]/ML
1000-10000 INJECTION, SOLUTION INTRAVENOUS; SUBCUTANEOUS
Status: DISCONTINUED | OUTPATIENT
Start: 2017-02-21 | End: 2017-02-21

## 2017-02-21 RX ORDER — MIDAZOLAM HYDROCHLORIDE 1 MG/ML
.5-1 INJECTION, SOLUTION INTRAMUSCULAR; INTRAVENOUS
Status: DISCONTINUED | OUTPATIENT
Start: 2017-02-21 | End: 2017-02-21 | Stop reason: SDUPTHER

## 2017-02-21 RX ORDER — HEPARIN SODIUM 200 [USP'U]/100ML
500 INJECTION, SOLUTION INTRAVENOUS ONCE
Status: DISCONTINUED | OUTPATIENT
Start: 2017-02-21 | End: 2017-02-21

## 2017-02-21 RX ORDER — NITROGLYCERIN 40 MG/100ML
5-20 INJECTION INTRAVENOUS
Status: DISCONTINUED | OUTPATIENT
Start: 2017-02-21 | End: 2017-02-21

## 2017-02-21 RX ORDER — FENTANYL CITRATE 50 UG/ML
12.5-1 INJECTION, SOLUTION INTRAMUSCULAR; INTRAVENOUS
Status: DISCONTINUED | OUTPATIENT
Start: 2017-02-21 | End: 2017-02-21 | Stop reason: SDUPTHER

## 2017-02-21 RX ORDER — LIDOCAINE HYDROCHLORIDE 10 MG/ML
1-60 INJECTION, SOLUTION EPIDURAL; INFILTRATION; INTRACAUDAL; PERINEURAL
Status: DISCONTINUED | OUTPATIENT
Start: 2017-02-21 | End: 2017-02-21 | Stop reason: SDUPTHER

## 2017-02-21 RX ORDER — HEPARIN SODIUM 1000 [USP'U]/ML
1000-10000 INJECTION, SOLUTION INTRAVENOUS; SUBCUTANEOUS AS NEEDED
Status: DISCONTINUED | OUTPATIENT
Start: 2017-02-21 | End: 2017-02-21 | Stop reason: SDUPTHER

## 2017-02-21 RX ORDER — ONDANSETRON 2 MG/ML
4 INJECTION INTRAMUSCULAR; INTRAVENOUS
Status: DISCONTINUED | OUTPATIENT
Start: 2017-02-21 | End: 2017-02-28 | Stop reason: HOSPADM

## 2017-02-21 RX ORDER — HEPARIN SODIUM 200 [USP'U]/100ML
500 INJECTION, SOLUTION INTRAVENOUS ONCE
Status: COMPLETED | OUTPATIENT
Start: 2017-02-21 | End: 2017-02-21

## 2017-02-21 RX ORDER — SODIUM CHLORIDE 9 MG/ML
1000 INJECTION, SOLUTION INTRAVENOUS ONCE
Status: COMPLETED | OUTPATIENT
Start: 2017-02-21 | End: 2017-02-21

## 2017-02-21 RX ORDER — VERAPAMIL HYDROCHLORIDE 2.5 MG/ML
2.5-5 INJECTION, SOLUTION INTRAVENOUS ONCE
Status: DISCONTINUED | OUTPATIENT
Start: 2017-02-21 | End: 2017-02-21

## 2017-02-21 RX ORDER — SODIUM CHLORIDE 9 MG/ML
75 INJECTION, SOLUTION INTRAVENOUS CONTINUOUS
Status: DISCONTINUED | OUTPATIENT
Start: 2017-02-21 | End: 2017-02-28 | Stop reason: HOSPADM

## 2017-02-21 RX ORDER — LOVASTATIN 20 MG/1
20 TABLET ORAL DAILY
Status: DISCONTINUED | OUTPATIENT
Start: 2017-02-21 | End: 2017-02-28 | Stop reason: HOSPADM

## 2017-02-21 RX ORDER — TAMSULOSIN HYDROCHLORIDE 0.4 MG/1
0.4 CAPSULE ORAL DAILY
Status: DISCONTINUED | OUTPATIENT
Start: 2017-02-21 | End: 2017-02-28 | Stop reason: HOSPADM

## 2017-02-21 RX ORDER — OXYBUTYNIN CHLORIDE 5 MG/1
5 TABLET ORAL DAILY
Status: DISCONTINUED | OUTPATIENT
Start: 2017-02-21 | End: 2017-02-28 | Stop reason: HOSPADM

## 2017-02-21 RX ORDER — MIDAZOLAM HYDROCHLORIDE 1 MG/ML
.5-2 INJECTION, SOLUTION INTRAMUSCULAR; INTRAVENOUS
Status: DISCONTINUED | OUTPATIENT
Start: 2017-02-21 | End: 2017-02-21 | Stop reason: HOSPADM

## 2017-02-21 RX ORDER — MORPHINE SULFATE 10 MG/ML
1 INJECTION, SOLUTION INTRAMUSCULAR; INTRAVENOUS
Status: DISCONTINUED | OUTPATIENT
Start: 2017-02-21 | End: 2017-02-28 | Stop reason: HOSPADM

## 2017-02-21 RX ORDER — DIPHENHYDRAMINE HYDROCHLORIDE 50 MG/ML
12.5 INJECTION, SOLUTION INTRAMUSCULAR; INTRAVENOUS
Status: DISCONTINUED | OUTPATIENT
Start: 2017-02-21 | End: 2017-02-28 | Stop reason: HOSPADM

## 2017-02-21 RX ORDER — LIDOCAINE HYDROCHLORIDE 10 MG/ML
1-30 INJECTION, SOLUTION EPIDURAL; INFILTRATION; INTRACAUDAL; PERINEURAL
Status: DISCONTINUED | OUTPATIENT
Start: 2017-02-21 | End: 2017-02-21

## 2017-02-21 RX ADMIN — HEPARIN SODIUM 1000 UNITS: 200 INJECTION, SOLUTION INTRAVENOUS at 13:11

## 2017-02-21 RX ADMIN — SODIUM CHLORIDE 1000 ML: 900 INJECTION, SOLUTION INTRAVENOUS at 11:30

## 2017-02-21 RX ADMIN — ISOSORBIDE MONONITRATE 60 MG: 60 TABLET, EXTENDED RELEASE ORAL at 09:48

## 2017-02-21 RX ADMIN — AMLODIPINE BESYLATE 5 MG: 5 TABLET ORAL at 09:47

## 2017-02-21 RX ADMIN — HEPARIN SODIUM 6000 UNITS: 1000 INJECTION, SOLUTION INTRAVENOUS; SUBCUTANEOUS at 13:29

## 2017-02-21 RX ADMIN — IOPAMIDOL 41 ML: 612 INJECTION, SOLUTION INTRAVENOUS at 13:52

## 2017-02-21 RX ADMIN — PIPERACILLIN AND TAZOBACTAM 3.38 G: 3; .375 INJECTION, POWDER, FOR SOLUTION INTRAVENOUS at 11:30

## 2017-02-21 RX ADMIN — LIDOCAINE HYDROCHLORIDE 5 ML: 10 INJECTION, SOLUTION EPIDURAL; INFILTRATION; INTRACAUDAL; PERINEURAL at 13:18

## 2017-02-21 RX ADMIN — MIDAZOLAM HYDROCHLORIDE 1 MG: 1 INJECTION, SOLUTION INTRAMUSCULAR; INTRAVENOUS at 13:18

## 2017-02-21 RX ADMIN — LOVASTATIN 20 MG: 20 TABLET ORAL at 09:48

## 2017-02-21 RX ADMIN — VANCOMYCIN HYDROCHLORIDE 1250 MG: 10 INJECTION, POWDER, LYOPHILIZED, FOR SOLUTION INTRAVENOUS at 20:43

## 2017-02-21 RX ADMIN — SODIUM CHLORIDE 75 ML/HR: 900 INJECTION, SOLUTION INTRAVENOUS at 12:00

## 2017-02-21 RX ADMIN — PIPERACILLIN AND TAZOBACTAM 3.38 G: 3; .375 INJECTION, POWDER, FOR SOLUTION INTRAVENOUS at 18:05

## 2017-02-21 RX ADMIN — PIPERACILLIN AND TAZOBACTAM 3.38 G: 3; .375 INJECTION, POWDER, FOR SOLUTION INTRAVENOUS at 00:14

## 2017-02-21 RX ADMIN — OXYBUTYNIN CHLORIDE 5 MG: 5 TABLET ORAL at 09:47

## 2017-02-21 RX ADMIN — LEVOTHYROXINE SODIUM 88 MCG: 88 TABLET ORAL at 09:48

## 2017-02-21 RX ADMIN — FENTANYL CITRATE 25 MCG: 50 INJECTION INTRAMUSCULAR; INTRAVENOUS at 13:18

## 2017-02-21 RX ADMIN — PIPERACILLIN AND TAZOBACTAM 3.38 G: 3; .375 INJECTION, POWDER, FOR SOLUTION INTRAVENOUS at 06:17

## 2017-02-21 RX ADMIN — SODIUM CHLORIDE 75 ML/HR: 450 INJECTION, SOLUTION INTRAVENOUS at 09:55

## 2017-02-21 RX ADMIN — TAMSULOSIN HYDROCHLORIDE 0.4 MG: 0.4 CAPSULE ORAL at 09:47

## 2017-02-21 RX ADMIN — PIPERACILLIN AND TAZOBACTAM 3.38 G: 3; .375 INJECTION, POWDER, FOR SOLUTION INTRAVENOUS at 23:21

## 2017-02-21 RX ADMIN — CLOPIDOGREL BISULFATE 75 MG: 75 TABLET ORAL at 16:51

## 2017-02-21 NOTE — ED NOTES
Bedside and Verbal shift change report given to [employer] (oncoming nurse) by Jackelyn Martínez RN (offgoing nurse). Report included the following information SBAR, Kardex, Intake/Output, MAR and Recent Results.

## 2017-02-21 NOTE — ROUTINE PROCESS
TRANSFER - IN REPORT:    Verbal report received from [facility]) on Josue  being received from [facility](unit) for routine progression of care      Report consisted of patients Situation, Background, Assessment and   Recommendations(SBAR). Information from the following report(s) SBAR, Procedure Summary and MAR was reviewed with the receiving nurse. Opportunity for questions and clarification was provided. Assessment completed upon patients arrival to unit and care assumed.

## 2017-02-21 NOTE — ROUTINE PROCESS
Bedside and Verbal shift change report given to Nirali Shepherd RN (oncoming nurse) by Cari Guaman RN (offgoing nurse). Report included the following information Kardex, Intake/Output, MAR and Recent Results.

## 2017-02-21 NOTE — PROGRESS NOTES
SUBJECTIVE:    Patient is off the floor for procedure. Chart reviewed    OBJECTIVE:    Visit Vitals    /61 (BP 1 Location: Left arm, BP Patient Position: At rest)    Pulse 62    Temp 97.5 °F (36.4 °C)    Resp 18    Ht 6' (1.829 m)    Wt 77.4 kg (170 lb 10.2 oz)    SpO2 99%    BMI 23.14 kg/m2     ASSESSMENT:    1. Left foot OM  2. Hypertension. 3. Peripheral vascular disease. 4. Coronary artery disease. 5. History of rectal cancer status post chemo and radiation in 2013.   6. Dyslipidemia    PLAN:    Vascular surgery input noted  Cont IV antibiotic  Will change IVF  Cont current management  Will try to see him later today if time allows me otherwise tomorrow      CMP:   Lab Results   Component Value Date/Time     (L) 02/20/2017 02:50 PM    K 4.2 02/20/2017 02:50 PM    CL 99 (L) 02/20/2017 02:50 PM    CO2 28 02/20/2017 02:50 PM    AGAP 8 02/20/2017 02:50 PM     (H) 02/20/2017 02:50 PM    BUN 19 (H) 02/20/2017 02:50 PM    CREA 1.35 (H) 02/20/2017 02:50 PM    GFRAA >60 02/20/2017 02:50 PM    GFRNA 51 (L) 02/20/2017 02:50 PM    CA 9.5 02/20/2017 02:50 PM    ALB 3.3 (L) 02/20/2017 02:50 PM    TP 7.7 02/20/2017 02:50 PM    GLOB 4.4 (H) 02/20/2017 02:50 PM    AGRAT 0.8 02/20/2017 02:50 PM    SGOT 14 (L) 02/20/2017 02:50 PM    ALT 28 02/20/2017 02:50 PM     CBC:   Lab Results   Component Value Date/Time    WBC 5.7 02/20/2017 02:50 PM    HGB 11.7 (L) 02/20/2017 02:50 PM    HCT 36.1 02/20/2017 02:50 PM     02/20/2017 02:50 PM

## 2017-02-21 NOTE — ROUTINE PROCESS
TRANSFER - OUT REPORT:    Verbal report given to [facility] RN(name) on Josue  being transferred to [facility](unit) for routine progression of care       Report consisted of patients Situation, Background, Assessment and   Recommendations(SBAR). Information from the following report(s) SBAR, Procedure Summary, Intake/Output and MAR was reviewed with the receiving nurse. Lines:   Peripheral IV 02/20/17 Right Antecubital (Active)   Site Assessment Clean, dry, & intact 2/20/2017  8:09 PM   Phlebitis Assessment 0 2/20/2017  8:09 PM   Infiltration Assessment 0 2/20/2017  8:09 PM   Dressing Status Clean, dry, & intact 2/20/2017  8:09 PM   Dressing Type Transparent;Tape 2/20/2017  8:00 PM   Hub Color/Line Status Pink 2/20/2017  8:09 PM   Alcohol Cap Used No 2/20/2017  8:00 PM        Opportunity for questions and clarification was provided.       Patient transported with:   Kenzei

## 2017-02-21 NOTE — ED NOTES
Pt sleeping on stretcher in no apparent distress at this time. Lights dimmed to facilitate rest. Call bell within reach. Will continue to monitor.

## 2017-02-21 NOTE — PROGRESS NOTES
1359: Pt received from cath lab, right femoral site c/d/i; angioseal and dsd applied during procedure. Pt verbalizes understanding of immobilizing leg.

## 2017-02-21 NOTE — OP NOTES
[address]    Name:  Daphney Garcia  MR#:  [id number]  :  1935  Account #:  [de-identified]  Date of Adm:  2017  Date of Surgery:  2017      PREOPERATIVE DIAGNOSIS: [place] class V arterial disease  with ulceration. POSTOPERATIVE DIAGNOSIS: [place] class V arterial disease  with ulceration. PROCEDURES PERFORMED:  1. Ultrasound-guided access of right common femoral artery. 2. Aortogram.  3. Bilateral lower extremity angiogram.  4. Second order catheterization and beyond. 5. Balloon angioplasty of the left external iliac artery. 6. Transluminal intravascular stent placement of the left superficial  femoral artery with a 6 x 120 Zilver PTX.  7. Arterial closure using Angio-Seal.  8. Moderate conscious sedation of 32 minutes. CULTURES: None. SPECIMENS REMOVED: None. DRAINS: None. ESTIMATED BLOOD LOSS: Less than 50 mL. ANESTHESIA: Moderate conscious sedation of 32 minutes. The  patient was given sedative medications through a licensed  independent nurse who monitored the vital signs throughout the case  and medications were given per my discretion. INDICATIONS FOR PROCEDURE: The patient is a gentleman with  left lower extremity toe infection and was recommended for angiogram.  The patient was given risks and benefits of the procedure including but  not limited to bleeding, infection, damage to adjacent structures, MI,  stroke, and death, as well as loss of lower extremity. The patient was  understanding of all these risks and underwent the procedure. OPERATIVE FINDINGS:  1. Aorta is patent, without any evidence of stenosis. 2. Bilateral renal arteries are patent, without stenosis. 3. Bilateral common iliac arteries appear to be patent without any  evidence of stenosis. 4. Bilateral external iliac arteries are both patent. The left side does  show a moderate 55-60% narrowing within the stent.  The right side  shows what could be a 50% narrowing of the proximal external iliac  artery. 5. Bilateral internal iliac arteries both show 70% narrowings at their  origins. 6. Bilateral common femoral arteries are both patent without any  evidence of stenosis. The left-to-right femoral bypass does appear to  be almost completely occluded. 7. Bilateral profunda femoris arteries appear to be patent without any  evidence of stenosis. 8. Right superficial femoral artery is patent without any evidence of  stenosis of what was visualized. LEFT LOWER EXTREMITY:  1. Superficial femoral artery does appear to be patent. There is a long  segment of 50% narrowing up to 70%, as it gets just before the  previous stent. 2. Above-knee popliteal artery is patent, without stenosis. 3. The below-knee popliteal artery was patent without stenosis. 4. Anterior tibial artery was patent without stenosis and is the dominant  vessel to the foot. 5. The tibioperoneal trunk artery is occluded. 6. Posterior tibial artery is occluded. 7. The peroneal artery is occluded. DESCRIPTION OF PROCEDURE: The patient was correctly identified  in the precath area and taken to the cath lab in stable condition. The  patient had a pre-incision timeout prior to incision. The patient was  prepped and draped in normal sterile fashion according to CDC  guidelines aseptic technique. We then were able to use an ultrasound  to visualize the fem-fem. This showed that a lot of the fem-fem did  appear to be mostly occluded. We numbed him up appropriately on  that right side, gained access into the fem-fem. Once we had access a  Mandril wire was placed and we upsized to a 4-Turkish sheath. Inner  dilator and Mandril wire were removed. There was some blood flow, so  a Bentson wire was then placed. A 4-Turkish sheath was then placed.   We then were able to gain access into the aorta with our Bentson  wire and ContraFlush catheter and shoot an aortogram. At this point,  we saw the above findings, which was quite surprising since he had  always had an occluded common iliac on the right side, but now seems  to have reopened on that side. We then were able to pull down to the  aortic bifurcation and shoot a bilateral lower extremity angiogram with  the above findings. We then heparinized the patient appropriately,  upsized to a 6-[place]garian sheath over the Bentson wire and  then ballooned the previous external iliac stent with a 7 x 4 balloon. Repeat angiogram showed resolution of the narrowing. We then put  our ContraFlush catheter down in the SFA. Shot a dedicated lower  extremity angiogram with the above findings. We then placed a Joseph  wire in and took a 64 x 120s Zilver PTX stent and extended our  previous stent proximally into the superficial femoral artery, post-  dilated with a 6 mm balloon. Repeat angiogram showed complete  resolution of the narrowing and no distal embolization. We then were  able to close with a 6-Bulgarian Angio-Seal.    RECOMMENDATIONS: The patient will eventually need a right lower  extremity angiogram if he has any issues. Given the fact that it would  be nice to keep his normal arterial tree open perfectly fine if his femoral  bypass occludes as this is no longer required.         MD ROMEO Ding / Guy.Pain  D:  02/21/2017   14:06  T:  02/21/2017   14:54  Job #:  [id number]

## 2017-02-21 NOTE — PROGRESS NOTES
Angiogram shows almost complete occlusion of femoral to femoral bypass but this also is not all bad seeing how patient's natural iliac arteries on the right side are now fully open although somewhat diseased. Ballooned left external iliac artery and stented left SFA. Only has one vessel runoff of GUSTAVO on left. No issues if fem-fem occludes as his normal arterial tree now appears open.

## 2017-02-21 NOTE — CONSULTS
[address]    Name:  Venus Emanuel  MR#:  [id number]  :  1935  Account #:  [de-identified]  Date of Adm:  2017  Date of Consultation:  2017      REASON FOR CONSULTATION: Medical management. SERVICE REQUESTING CONSULTATION: Podiatry. HISTORY OF PRESENT ILLNESS: The patient is a 77-year-old   male with history of peripheral vascular disease and what  appears to be a prolonged course of left foot second toe infection  treated as an outpatient. He was instructed to come to the ER by his  podiatrist for non-improvement with outpatient management. He  reports he has been taking antibiotics. He has had redness and  swelling of the left second toe for about 3 months. REVIEW OF SYSTEMS: Please see above, otherwise 10-point review  of systems checked and negative. PAST MEDICAL HISTORY  1. Coronary artery disease. 2. Hypertension. 3. Peripheral vascular disease. 4. Dyslipidemia. 5. History of rectal cancer status post chemo and radiation in . 6. Hip surgery. ALLERGIES: NO KNOWN DRUG ALLERGIES. SOCIAL HISTORY: He reports remote tobacco use history. He reports  he drinks beer daily. He is retired and . FAMILY HISTORY: Both parents are . Brother with history of  heart disease. Child with history of breast cancer diagnosed at 36. MEDICATIONS  The following information obtained from a list provided by the patient:  1. Aspirin 81 mg daily. 2. MiraLax. 3. Plavix 75 mg daily. 4. Imdur 60 mg daily. 5. Mevacor 20 mg daily. 6. Oxybutynin 5 mg daily. 7. Flomax 0.4 mg daily. PHYSICAL EXAMINATION  VITAL SIGNS: He is afebrile with stable vital signs. GENERAL: He is a thin, pleasant male who appears stated age,  resting comfortably, in no acute distress. PSYCHIATRIC: He is alert, awake, oriented. He has appropriate affect. Recent and remote memory appear intact. HEENT: Head is atraumatic, normocephalic.  Sclerae anicteric. Oropharynx without lesions. He has moist mucous membranes. NECK: Supple. LYMPHATICS: No neck or axillary lymphadenopathy. CARDIOVASCULAR: Regular rate and rhythm. No murmurs. No  jugular venous distention. No peripheral edema. PULMONARY: Clear to auscultation bilaterally. GASTROINTESTINAL: Abdomen is soft, nontender, nondistended. SKIN: Warm, dry, without lesions with the exception of the left second  toe that is edematous and erythematous involving the entire toe with  the edema and the erythema with some erythema extending  proximally, but not much beyond the first metatarsophalangeal joint. The rest of his skin exam is unremarkable. NEUROLOGICAL: Nonfocal.  MUSCULOSKELETAL: Unremarkable. LABORATORY DATA: CBC with stable anemia, normocytic, platelet  count is normal, no bandemia. His sodium is 135. His glucose is  elevated at 191, creatinine is 1.35 up from a baseline of around 0.8,  albumin 3.3, globulin is elevated. The rest of his liver testing is  unremarkable. IMAGING STUDIES: Include x-ray of the foot which showed  amputation of great toe at mid metatarsal level, this is the left foot;  evidence of soft tissue wound in the tip of the second toe with mild  cortical indistinctness at the distal phalanx tuft concerning for  osteomyelitis, osteopenia significantly limits evaluation. Chest x-ray:  Official read is streaky densities in the upper lung similar to prior;  interval removal of right neck catheter; I have also seen this study  independently and agree with the official read. His EKG shows that he  has sinus rhythm with first-degree AV block and premature ventricular  complexes; no clear ST elevations, per my read; normal axis. IMPRESSION  1. What appears to be second toe infection, left foot, managed in the  outpatient setting by Podiatry with antibiotics with not improvement. Podiatry to admit this patient and further plans per Podiatry.   2. History of hypertension, peripheral vascular disease, dyslipidemia,  coronary artery disease, rectal cancer status post treatment, no active  issues at this time. 3. Recommendation is to hold Plavix and aspirin in light of possible  surgical intervention in the near future. I am going to continue his  Mevacor, Imdur, Norvasc, Synthroid and the rest of his other  medications as listed above. 4. The patient noted to have an increase in his creatinine  that has  gone up from 1.01 to 1.35. However, the acuity still not clear whether  this is an increase in 48 hours. He has been taking Keflex and this  likely would explain the increased creatinine. 5. Recommendation regarding his renal issues is to hold Keflex,  continue to monitor his renal function. Further workup and evaluation  should this measure not improve his creatinine. 6. Treat for infection with vancomycin and Zosyn for now. Thank you for allowing us to participate in the care of this patient.         MD JUDI Mccullough / LYLA  D:  02/20/2017   21:07  T:  02/20/2017   21:45  Job #:  [id number]
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
## 2017-02-21 NOTE — CDMP QUERY
Please clarify if the patient's Osteomyelitis of L second toe is considered Acute or Chronic and document in progress notes.    Thank you,   Iva [facility], [address]
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
## 2017-02-22 LAB
ANION GAP BLD CALC-SCNC: 6 MMOL/L (ref 3–18)
BASOPHILS # BLD AUTO: 0 K/UL (ref 0–0.1)
BASOPHILS # BLD: 1 % (ref 0–2)
BUN SERPL-MCNC: 14 MG/DL (ref 7–18)
BUN/CREAT SERPL: 12 (ref 12–20)
CALCIUM SERPL-MCNC: 8.9 MG/DL (ref 8.5–10.1)
CHLORIDE SERPL-SCNC: 109 MMOL/L (ref 100–108)
CO2 SERPL-SCNC: 26 MMOL/L (ref 21–32)
CREAT SERPL-MCNC: 1.13 MG/DL (ref 0.6–1.3)
DIFFERENTIAL METHOD BLD: ABNORMAL
EOSINOPHIL # BLD: 0.3 K/UL (ref 0–0.4)
EOSINOPHIL NFR BLD: 5 % (ref 0–5)
ERYTHROCYTE [DISTWIDTH] IN BLOOD BY AUTOMATED COUNT: 15.7 % (ref 11.6–14.5)
GLUCOSE SERPL-MCNC: 95 MG/DL (ref 74–99)
HCT VFR BLD AUTO: 32.5 % (ref 36–48)
HGB BLD-MCNC: 10.4 G/DL (ref 13–16)
LYMPHOCYTES # BLD AUTO: 15 % (ref 21–52)
LYMPHOCYTES # BLD: 0.9 K/UL (ref 0.9–3.6)
MAGNESIUM SERPL-MCNC: 2.4 MG/DL (ref 1.8–2.4)
MCH RBC QN AUTO: 28.4 PG (ref 24–34)
MCHC RBC AUTO-ENTMCNC: 32 G/DL (ref 31–37)
MCV RBC AUTO: 88.8 FL (ref 74–97)
MONOCYTES # BLD: 0.4 K/UL (ref 0.05–1.2)
MONOCYTES NFR BLD AUTO: 7 % (ref 3–10)
NEUTS SEG # BLD: 4.3 K/UL (ref 1.8–8)
NEUTS SEG NFR BLD AUTO: 72 % (ref 40–73)
PLATELET # BLD AUTO: 273 K/UL (ref 135–420)
PMV BLD AUTO: 9.7 FL (ref 9.2–11.8)
POTASSIUM SERPL-SCNC: 4.2 MMOL/L (ref 3.5–5.5)
RBC # BLD AUTO: 3.66 M/UL (ref 4.7–5.5)
SODIUM SERPL-SCNC: 141 MMOL/L (ref 136–145)
WBC # BLD AUTO: 6 K/UL (ref 4.6–13.2)

## 2017-02-22 PROCEDURE — 74011000258 HC RX REV CODE- 258: Performed by: INTERNAL MEDICINE

## 2017-02-22 PROCEDURE — 85025 COMPLETE CBC W/AUTO DIFF WBC: CPT | Performed by: SURGERY

## 2017-02-22 PROCEDURE — 83735 ASSAY OF MAGNESIUM: CPT | Performed by: SURGERY

## 2017-02-22 PROCEDURE — 65270000029 HC RM PRIVATE

## 2017-02-22 PROCEDURE — 74011250637 HC RX REV CODE- 250/637: Performed by: SURGERY

## 2017-02-22 PROCEDURE — 87070 CULTURE OTHR SPECIMN AEROBIC: CPT | Performed by: INTERNAL MEDICINE

## 2017-02-22 PROCEDURE — 36415 COLL VENOUS BLD VENIPUNCTURE: CPT | Performed by: SURGERY

## 2017-02-22 PROCEDURE — 80048 BASIC METABOLIC PNL TOTAL CA: CPT | Performed by: SURGERY

## 2017-02-22 PROCEDURE — 74011250636 HC RX REV CODE- 250/636: Performed by: INTERNAL MEDICINE

## 2017-02-22 PROCEDURE — 74011250637 HC RX REV CODE- 250/637: Performed by: INTERNAL MEDICINE

## 2017-02-22 PROCEDURE — 74011000250 HC RX REV CODE- 250: Performed by: PODIATRIST

## 2017-02-22 RX ADMIN — OXYBUTYNIN CHLORIDE 5 MG: 5 TABLET ORAL at 08:43

## 2017-02-22 RX ADMIN — PIPERACILLIN AND TAZOBACTAM 3.38 G: 3; .375 INJECTION, POWDER, FOR SOLUTION INTRAVENOUS at 12:48

## 2017-02-22 RX ADMIN — PIPERACILLIN AND TAZOBACTAM 3.38 G: 3; .375 INJECTION, POWDER, FOR SOLUTION INTRAVENOUS at 17:52

## 2017-02-22 RX ADMIN — LEVOTHYROXINE SODIUM 88 MCG: 88 TABLET ORAL at 06:33

## 2017-02-22 RX ADMIN — ISOSORBIDE MONONITRATE 60 MG: 60 TABLET, EXTENDED RELEASE ORAL at 08:43

## 2017-02-22 RX ADMIN — PIPERACILLIN AND TAZOBACTAM 3.38 G: 3; .375 INJECTION, POWDER, FOR SOLUTION INTRAVENOUS at 05:02

## 2017-02-22 RX ADMIN — LOVASTATIN 20 MG: 20 TABLET ORAL at 08:42

## 2017-02-22 RX ADMIN — DAKIN'S SOLUTION 0.125% (QUARTER STRENGTH): 0.12 SOLUTION at 18:00

## 2017-02-22 RX ADMIN — TAMSULOSIN HYDROCHLORIDE 0.4 MG: 0.4 CAPSULE ORAL at 08:43

## 2017-02-22 RX ADMIN — CLOPIDOGREL BISULFATE 75 MG: 75 TABLET ORAL at 08:44

## 2017-02-22 RX ADMIN — VANCOMYCIN HYDROCHLORIDE 1250 MG: 10 INJECTION, POWDER, LYOPHILIZED, FOR SOLUTION INTRAVENOUS at 15:55

## 2017-02-22 RX ADMIN — AMLODIPINE BESYLATE 5 MG: 5 TABLET ORAL at 08:42

## 2017-02-22 NOTE — PROGRESS NOTES
Care Management Interventions  PCP Verified by CM: Yes (DR. Ulysses Olivares)  Palliative Care Consult (Criteria: CHF and RRAT>21): No  Reason for No Palliative Care Consult: Other (see comment) (NO ORDER)  Mode of Transport at Discharge:  Other (see comment) (FAMILY WILL TRANSPORT IF ABLE TO DO SO)  Transition of Care Consult (CM Consult): Discharge Planning  Current Support Network: Lives with Spouse, Own Home, Family Lives Nearby  Confirm Follow Up Transport: Family  Plan discussed with Pt/Family/Caregiver: Yes (PLAN AT [facility])  Discharge Location  Discharge Placement: Home with family assistance

## 2017-02-22 NOTE — PROGRESS NOTES
SUBJECTIVE:    Denies for chest and abdominal pain. No SOB or cough. Foot pain is better. Had a BM    OBJECTIVE:    Visit Vitals    /59 (BP 1 Location: Left arm, BP Patient Position: At rest)    Pulse 72    Temp 98.2 °F (36.8 °C)    Resp 20    Ht 6' (1.829 m)    Wt 76.5 kg (168 lb 10.4 oz)    SpO2 95%    BMI 22.87 kg/m2     RS: CTA B/L  CVS: RRR  GI: NT, BS +  Extremities: no pedal edema, left second toe -erythematous and edematous  General: NAD    ASSESSMENT:    1. Left second fingure OM due to PVD  2. Hypertension. 3. Peripheral vascular disease. 4. Coronary artery disease. 5. History of rectal cancer status post chemo and radiation in 2013.   6. Dyslipidemia    PLAN:    Vascular surgery to follow  Cont IV antibiotic  Cont current management  Nothing to add from my point    CMP:   Lab Results   Component Value Date/Time     02/22/2017 05:04 AM    K 4.2 02/22/2017 05:04 AM     (H) 02/22/2017 05:04 AM    CO2 26 02/22/2017 05:04 AM    AGAP 6 02/22/2017 05:04 AM    GLU 95 02/22/2017 05:04 AM    BUN 14 02/22/2017 05:04 AM    CREA 1.13 02/22/2017 05:04 AM    GFRAA >60 02/22/2017 05:04 AM    GFRNA >60 02/22/2017 05:04 AM    CA 8.9 02/22/2017 05:04 AM    MG 2.4 02/22/2017 05:04 AM     CBC:   Lab Results   Component Value Date/Time    WBC 6.0 02/22/2017 05:04 AM    HGB 10.4 (L) 02/22/2017 05:04 AM    HCT 32.5 (L) 02/22/2017 05:04 AM     02/22/2017 05:04 AM

## 2017-02-22 NOTE — PROGRESS NOTES
Seen at bedside awake and alert. S/P vascular procedure. Cellulitis has improved . Inspected left foot. Foot warm. Once cellulitis clears plan for toe amputation on Friday.

## 2017-02-22 NOTE — PROGRESS NOTES
conducted an initial consultation and Spiritual Assessment for Josue, who is a 80 y.o.,male. Patients Primary Language is: [place]. According to the patients EMR Synagogue Affiliation is: [facility].     The reason the Patient came to the hospital is:   Patient Active Problem List    Diagnosis Date Noted    Osteomyelitis ([facility] [address] 02/20/2017    Essential hypertension 11/21/2016    Open wound of left heel 07/13/2016    Open wound of right lower leg 07/13/2016    Advance care planning 05/11/2016    Vitamin D insufficiency 04/16/2016    Benign hypertension without congestive heart failure     Diastolic dysfunction without heart failure     Dyslipidemia     Peripheral vascular disease ([address]     Spinal stenosis of lumbar region     Chronic anemia     Hypothyroidism     History of peptic ulcer disease     History of left-sided carotid endarterectomy     Hypotension due to blood loss 04/09/2016    History of lower GI bleeding 04/09/2016    Anemia due to acute blood loss 04/09/2016    Generalized weakness 04/09/2016    Impaired mobility and ADLs 04/09/2016    Bleeding risk due to aspirin 04/09/2016    Non-ST elevation myocardial infarction (NSTEMI), subsequent episode of care ([address] 04/09/2016    Decreased calculated glomerular filtration rate (GFR) 04/09/2016    Need for isolation 04/09/2016    Acute encephalopathy 04/09/2016    Acute kidney injury ([address] 04/09/2016    Aftercare following surgery of the musculoskeletal system 03/24/2016    Ischemic rest pain of lower extremity ([address] 03/18/2016    Carotid artery disease ([address] 03/08/2016    History of MRSA infection 12/28/2015    Neuropathy 11/18/2015    Fracture of neck of femur ([address] 10/07/2015    Acetabular fracture ([address] 09/24/2015    History of malignant neoplasm of anus 01/01/2013        The  provided the following Interventions:  Initiated a relationship of care and support.    Explored issues of kaern, belief, spirituality and Episcopal/ritual needs while hospitalized. Listened empathically. Provided chaplaincy education. Provided information about Spiritual Care Services. Offered prayer and assurance of continued prayers on patient's behalf. Chart reviewed. The following outcomes where achieved:  Patient shared limited information about both their medical narrative and spiritual journey/beliefs.  confirmed Patient's Lutheran Affiliation. Patient processed feeling about current hospitalization. Patient expressed gratitude for 's visit. Assessment:  Patient does not have any Episcopal/cultural needs that will affect patients preferences in health care. There are no spiritual or Episcopal issues which require intervention at this time. Plan:  Chaplains will continue to follow and will provide pastoral care on an as needed/requested basis.  recommends bedside caregivers page  on duty if patient shows signs of acute spiritual or emotional distress. Chaplain Sabiha ZARATE  [address] [facility]  [phone]

## 2017-02-22 NOTE — PROGRESS NOTES
Pt s/p angioplasty of left external iliac artery and left sfa stent  Doing well, no complaints  Waiting on podiatry visit today for determination of toe amp  It had also been suggested he will need follow up right leg intervention if any issues arise
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
## 2017-02-22 NOTE — PROGRESS NOTES
Bedside and Verbal shift change report given to Reji Palumbo RN  (oncoming nurse) by Micheline Reed RN  (offgoing nurse). Report included the following information SBAR, Kardex, ED Summary, Intake/Output, MAR and Recent Results.

## 2017-02-23 ENCOUNTER — ANESTHESIA EVENT (OUTPATIENT)
Dept: SURGERY | Age: 82
DRG: 253 | End: 2017-02-23
Payer: MEDICARE

## 2017-02-23 LAB
ANION GAP BLD CALC-SCNC: 6 MMOL/L (ref 3–18)
BUN SERPL-MCNC: 14 MG/DL (ref 7–18)
BUN/CREAT SERPL: 13 (ref 12–20)
CALCIUM SERPL-MCNC: 9.2 MG/DL (ref 8.5–10.1)
CHLORIDE SERPL-SCNC: 107 MMOL/L (ref 100–108)
CO2 SERPL-SCNC: 27 MMOL/L (ref 21–32)
CREAT SERPL-MCNC: 1.11 MG/DL (ref 0.6–1.3)
DATE LAST DOSE: NORMAL
GLUCOSE SERPL-MCNC: 87 MG/DL (ref 74–99)
POTASSIUM SERPL-SCNC: 4 MMOL/L (ref 3.5–5.5)
REPORTED DOSE,DOSE: NORMAL UNITS
REPORTED DOSE/TIME,TMG: 1500
SODIUM SERPL-SCNC: 140 MMOL/L (ref 136–145)
VANCOMYCIN TROUGH SERPL-MCNC: 15.7 UG/ML (ref 10–20)

## 2017-02-23 PROCEDURE — 74011250637 HC RX REV CODE- 250/637: Performed by: SURGERY

## 2017-02-23 PROCEDURE — 74011250636 HC RX REV CODE- 250/636: Performed by: SURGERY

## 2017-02-23 PROCEDURE — 80202 ASSAY OF VANCOMYCIN: CPT | Performed by: INTERNAL MEDICINE

## 2017-02-23 PROCEDURE — 74011250636 HC RX REV CODE- 250/636: Performed by: INTERNAL MEDICINE

## 2017-02-23 PROCEDURE — 65270000029 HC RM PRIVATE

## 2017-02-23 PROCEDURE — 80048 BASIC METABOLIC PNL TOTAL CA: CPT | Performed by: INTERNAL MEDICINE

## 2017-02-23 PROCEDURE — 74011250637 HC RX REV CODE- 250/637: Performed by: INTERNAL MEDICINE

## 2017-02-23 PROCEDURE — 74011000258 HC RX REV CODE- 258: Performed by: INTERNAL MEDICINE

## 2017-02-23 PROCEDURE — 36415 COLL VENOUS BLD VENIPUNCTURE: CPT | Performed by: INTERNAL MEDICINE

## 2017-02-23 RX ADMIN — PIPERACILLIN AND TAZOBACTAM 3.38 G: 3; .375 INJECTION, POWDER, FOR SOLUTION INTRAVENOUS at 18:00

## 2017-02-23 RX ADMIN — AMLODIPINE BESYLATE 5 MG: 5 TABLET ORAL at 13:08

## 2017-02-23 RX ADMIN — DAKIN'S SOLUTION 0.125% (QUARTER STRENGTH): 0.12 SOLUTION at 18:00

## 2017-02-23 RX ADMIN — PIPERACILLIN AND TAZOBACTAM 3.38 G: 3; .375 INJECTION, POWDER, FOR SOLUTION INTRAVENOUS at 00:18

## 2017-02-23 RX ADMIN — TAMSULOSIN HYDROCHLORIDE 0.4 MG: 0.4 CAPSULE ORAL at 13:08

## 2017-02-23 RX ADMIN — DIPHENHYDRAMINE HYDROCHLORIDE 12.5 MG: 50 INJECTION INTRAMUSCULAR; INTRAVENOUS at 00:21

## 2017-02-23 RX ADMIN — CLOPIDOGREL BISULFATE 75 MG: 75 TABLET ORAL at 09:00

## 2017-02-23 RX ADMIN — LEVOTHYROXINE SODIUM 88 MCG: 88 TABLET ORAL at 13:08

## 2017-02-23 RX ADMIN — ISOSORBIDE MONONITRATE 60 MG: 60 TABLET, EXTENDED RELEASE ORAL at 09:07

## 2017-02-23 RX ADMIN — DAKIN'S SOLUTION 0.125% (QUARTER STRENGTH): 0.12 SOLUTION at 09:00

## 2017-02-23 RX ADMIN — LOVASTATIN 20 MG: 20 TABLET ORAL at 13:08

## 2017-02-23 RX ADMIN — SODIUM CHLORIDE 75 ML/HR: 900 INJECTION, SOLUTION INTRAVENOUS at 13:16

## 2017-02-23 RX ADMIN — PIPERACILLIN AND TAZOBACTAM 3.38 G: 3; .375 INJECTION, POWDER, FOR SOLUTION INTRAVENOUS at 13:17

## 2017-02-23 RX ADMIN — OXYCODONE HYDROCHLORIDE AND ACETAMINOPHEN 1 TABLET: 5; 325 TABLET ORAL at 21:13

## 2017-02-23 RX ADMIN — OXYBUTYNIN CHLORIDE 5 MG: 5 TABLET ORAL at 09:00

## 2017-02-23 RX ADMIN — OXYCODONE HYDROCHLORIDE AND ACETAMINOPHEN 1 TABLET: 5; 325 TABLET ORAL at 00:16

## 2017-02-23 RX ADMIN — VANCOMYCIN HYDROCHLORIDE 1250 MG: 10 INJECTION, POWDER, LYOPHILIZED, FOR SOLUTION INTRAVENOUS at 13:16

## 2017-02-23 RX ADMIN — PIPERACILLIN AND TAZOBACTAM 3.38 G: 3; .375 INJECTION, POWDER, FOR SOLUTION INTRAVENOUS at 06:10

## 2017-02-23 NOTE — PROGRESS NOTES
SUBJECTIVE:    Denies for chest and abdominal pain. No SOB or cough. Foot pain is better. Patient wishes to be a full code    OBJECTIVE:    Visit Vitals    /71 (BP 1 Location: Right arm, BP Patient Position: At rest)    Pulse 63    Temp 97 °F (36.1 °C)    Resp 18    Ht 6' (1.829 m)    Wt 76.5 kg (168 lb 10.4 oz)    SpO2 99%    BMI 22.87 kg/m2     RS: CTA B/L  CVS: RRR  GI: NT, BS +  Extremities: left foot dressing in situ  General: NAD    ASSESSMENT:    1. Left second fingure OM due to PVD  2. Hypertension. 3. Peripheral vascular disease. 4. Coronary artery disease. 5. History of rectal cancer status post chemo and radiation in 2013. 6. Dyslipidemia    PLAN:    Vascular surgery to follow  Cont IV antibiotic per ID  AMPUTATION in am  Cont current management.  Nothing to add from my point    CMP:   Lab Results   Component Value Date/Time     02/23/2017 04:15 AM    K 4.0 02/23/2017 04:15 AM     02/23/2017 04:15 AM    CO2 27 02/23/2017 04:15 AM    AGAP 6 02/23/2017 04:15 AM    GLU 87 02/23/2017 04:15 AM    BUN 14 02/23/2017 04:15 AM    CREA 1.11 02/23/2017 04:15 AM    GFRAA >60 02/23/2017 04:15 AM    GFRNA >60 02/23/2017 04:15 AM    CA 9.2 02/23/2017 04:15 AM     CBC:   No results found for: WBC, HGB, HGBEXT, HCT, HCTEXT, PLT, PLTEXT, HGBEXT, HCTEXT, PLTEXT

## 2017-02-23 NOTE — INTERDISCIPLINARY ROUNDS
IDR/SLIDR Summary          Patient: Jimy Archibald MRN: [id number]    Age: 80 y.o. YOB: 1935 Room/Bed: [facility]   Admit Diagnosis: Osteomyelitis (HCC)  DX  Principal Diagnosis: <principal problem not specified>   Goals: pain control  Readmission: NO  Quality Measure: Not applicable  VTE Prophylaxis: Not needed  Influenza Vaccine screening completed? YES  Pneumococcal Vaccine screening completed? YES  Mobility needs: Yes   Nutrition plan:Yes  Consults: P. T and O.T. Financial concerns:Yes  Escalated to CM? NO  RRAT Score: 23   Interventions:  Testing due for pt today?  YES  LOS: 3 days Expected length of stay 6 days  Discharge plan: home   PCP: Jose Miguel Lugo MD  Transportation needs: No    Days before discharge:two or more days before discharge   Discharge disposition: Home    Signed:     Breanne Thakkar RN  2/23/2017  9:58 AM

## 2017-02-23 NOTE — PROGRESS NOTES
Pt sleeping comfortably. Did not wake for exam.   Now s/p angioplasty of left external iliac artery and left sfa stent  Note plan for OR on Friday per Podiatry. May need follow up right leg intervention if any issues arise  Please call with questions.      Palmira Solis  [phone]

## 2017-02-23 NOTE — PROGRESS NOTES
Infectious Disease progress Note    Requested by: Dr. Sandra Mcclelland    Reason:     Current abx Prior abx   Vancomycin since 2/20  Pip/tazo since 2/22 Ceftriaxone 2/20     Lines:       Assessment :    80 y.o. male with h/o anemia, GI bleed, HTN, dyslipidemia, vitamin D deficiency, hypercholesterolemia and cancer who presented to ED on 2/20 complaining of second toe pain, edema and erythema on the L foot onset 4 days ago    prior cultures reveal pseudomonas in wound culture right leg, left heel in 7/2016; mrsa left great toe in 12/2015    Clinical picture consistent with left second toe abscess, left second toe distal tip chronic osteomyelitis, left foot cellulitis in setting of peripheral arterial disease. Patient has been appropriately managed with vascular intervention on 2/21. Exact microbial etiology of infection is not known. Chronicity of illness likely suggest polymicrobial infection with gram negatives/gram positives such as mrsa. Wound cultures 2/22: pending      Recommendations:    1. Continue vancomycin, pip/tazo  2. f/u cultures of purulent drainage left second toe  3. Agree with amputation of left second toe once erythema more localized. Above plan was discussed in details with patient, and dr Sandra Mcclelland. Please call me if any further questions or concerns. Will continue to participate in the care of this patient. subjective:      Currently patient has decreased discomfort left second toe. Patient denies headaches, visual disturbances, sore throat, runny nose, earaches, cp, sob, chills, cough, abdominal pain, diarrhea, burning micturition,  or weakness in extremities. He denies back pain/flank pain. home Medication List    Details   cephALEXin (KEFLEX) 500 mg capsule Take 1 Cap by mouth four (4) times daily for 7 days.   Qty: 28 Cap, Refills: 0      levothyroxine (SYNTHROID) 88 mcg tablet TAKE ONE TABLET BY MOUTH ONCE DAILY BEFORE BREAKFAST  Qty: 90 Tab, Refills: 3    Associated Diagnoses: Hypothyroidism, unspecified type      amLODIPine (NORVASC) 5 mg tablet TAKE ONE TABLET BY MOUTH ONCE DAILY  Qty: 90 Tab, Refills: 3    Associated Diagnoses: Essential hypertension      isosorbide mononitrate ER (IMDUR) 60 mg CR tablet 1 tablet each AM  Qty: 90 Tab, Refills: 3      clopidogrel (PLAVIX) 75 mg tab Take 1 Tab by mouth daily (with dinner). Qty: 90 Tab, Refills: 6      lovastatin (MEVACOR) 20 mg tablet Take 1 Tab by mouth daily. Qty: 90 Tab, Refills: 3      oxyCODONE-acetaminophen (PERCOCET 7.5) 7.5-325 mg per tablet 1 tablet every six hours as needed for pain  Qty: 120 Tab, Refills: 0      oxybutynin (DITROPAN) 5 mg tablet Take 1 Tab by mouth three (3) times daily. Indications: BLADDER HYPERACTIVITY, URINARY URGENCY  Qty: 90 Tab, Refills: 6    Associated Diagnoses: Dysuria; Benign prostatic hyperplasia, presence of lower urinary tract symptoms unspecified, unspecified morphology; Radiation cystitis      multivitamin with iron tablet Take 1 Tab by mouth daily. POLYETHYLENE GLYCOL 3350 (MIRALAX PO) Take  by mouth as needed. tamsulosin (FLOMAX) 0.4 mg capsule 1 tablet daily at supper  Qty: 30 Cap, Refills: 6      aspirin 81 mg chewable tablet Take 1 Tab by mouth daily. Qty: 30 Tab, Refills: 0      trimethoprim-sulfamethoxazole (BACTRIM DS) 160-800 mg per tablet Take 1 Tab by mouth two (2) times a day for 7 days.   Qty: 14 Tab, Refills: 0             Current Facility-Administered Medications   Medication Dose Route Frequency    vancomycin (VANCOCIN) 1,250 mg in 0.9% sodium chloride 250 mL IVPB  1,250 mg IntraVENous Q18H    VANCOMYCIN TROUGH DUE   Other Daily    sodium hypochlorite (QUARTER STRENGTH DAKIN'S) 0.125% irrigation (bottle)   Topical BID    amLODIPine (NORVASC) tablet 5 mg  5 mg Oral DAILY    isosorbide mononitrate ER (IMDUR) tablet 60 mg  60 mg Oral DAILY    levothyroxine (SYNTHROID) tablet 88 mcg  88 mcg Oral ACB    lovastatin (MEVACOR) tablet 20 mg  20 mg Oral DAILY  oxybutynin (DITROPAN) tablet 5 mg  5 mg Oral DAILY    tamsulosin (FLOMAX) capsule 0.4 mg  0.4 mg Oral DAILY    0.9% sodium chloride infusion  75 mL/hr IntraVENous CONTINUOUS    clopidogrel (PLAVIX) tablet 75 mg  75 mg Oral DAILY    acetaminophen (TYLENOL) tablet 650 mg  650 mg Oral Q4H PRN    oxyCODONE-acetaminophen (PERCOCET) 5-325 mg per tablet 1 Tab  1 Tab Oral Q4H PRN    morphine injection 1 mg  1 mg IntraVENous Q1H PRN    ondansetron (ZOFRAN) injection 4 mg  4 mg IntraVENous Q6H PRN    diphenhydrAMINE (BENADRYL) injection 12.5 mg  12.5 mg IntraVENous Q4H PRN    piperacillin-tazobactam (ZOSYN) 3.375 g in 0.9% sodium chloride (MBP/ADV) 100 mL MBP  3.375 g IntraVENous Q6H       Allergies: Latex and Tape [adhesive]    Temp (24hrs), Av.7 °F (36.5 °C), Min:97.3 °F (36.3 °C), Max:98.2 °F (36.8 °C)    Visit Vitals    /56 (BP 1 Location: Right arm, BP Patient Position: At rest)    Pulse (!) 52    Temp 97.3 °F (36.3 °C)    Resp 18    Ht 6' (1.829 m)    Wt 76.5 kg (168 lb 10.4 oz)    SpO2 96%    BMI 22.87 kg/m2       ROS: 12 point ROS obtained in details. Pertinent positives as mentioned in HPI,   otherwise negative    Physical Exam:    GENERAL: He is a thin, pleasant male who appears stated age, resting comfortably, in no acute distress. HEENT: Head is atraumatic, normocephalic. Sclerae anicteric. Oropharynx without lesions. He has moist mucous membranes. NECK: Supple. LYMPHATICS: No neck or axillary lymphadenopathy. CARDIOVASCULAR: Regular rate and rhythm. No murmurs. No jugular venous distention. No peripheral edema. PULMONARY: Clear to auscultation bilaterally. GASTROINTESTINAL: Abdomen is soft, nontender, nondistended.   SKIN: Warm, dry, without lesions with the exception of the left second toe that is edematous and erythematous involving the entire toe with the edema and the erythema with some erythema extending proximally to left foot per report  The rest of his skin exam is unremarkable. NEUROLOGICAL: Nonfocal.  MUSCULOSKELETAL: no edema of any extremities other than left second toe, able to palpate dorsalis pedis bilaterally  PSYCHIATRIC: He is alert, awake, oriented. He has appropriate affect.   Recent and remote memory appear intact    Labs: Results:   Chemistry Recent Labs      02/23/17   0415  02/22/17   0504  02/21/17   1250  02/20/17   1450   GLU  87  95  99  191*   NA  140  141  140  135*   K  4.0  4.2  4.4  4.2   CL  107  109*  106  99*   CO2  27  26  28  28   BUN  14  14  15  19*   CREA  1.11  1.13  1.11  1.35*   CA  9.2  8.9  8.6  9.5   AGAP  6  6  6  8   BUCR  13  12  14  14   AP   --    --    --   117   TP   --    --    --   7.7   ALB   --    --    --   3.3*   GLOB   --    --    --   4.4*   AGRAT   --    --    --   0.8      CBC w/Diff Recent Labs      02/22/17   0504  02/20/17   1450   WBC  6.0  5.7   RBC  3.66*  4.07*   HGB  10.4*  11.7*   HCT  32.5*  36.1   PLT  273  253   GRANS  72  76*   LYMPH  15*  17*   EOS  5  1      Microbiology Recent Labs      02/22/17   1734  02/20/17   1525  02/20/17   1450   CULT  PENDING  NO GROWTH 3 DAYS  NO GROWTH 3 DAYS          RADIOLOGY:    All available imaging studies/reports in [facility] for this admission were reviewed    Dr. Patito Bar, Infectious Disease Specialist  [phone]  February 23, 2017  4:25 PM

## 2017-02-23 NOTE — PROGRESS NOTES
Seen at bedside awake and alert. Inspected left foot. Reduced swelling and clearing cellulitis. Distal 1/3 to 1/2 of left second toe is enlarged, sinus tract with pus. Has osteitis and abscess distal phalnge.   Amputation planned in AM.

## 2017-02-24 ENCOUNTER — ANESTHESIA (OUTPATIENT)
Dept: SURGERY | Age: 82
DRG: 253 | End: 2017-02-24
Payer: MEDICARE

## 2017-02-24 PROCEDURE — 77030018836 HC SOL IRR NACL ICUM -A: Performed by: PODIATRIST

## 2017-02-24 PROCEDURE — 77030032490 HC SLV COMPR SCD KNE COVD -B: Performed by: PODIATRIST

## 2017-02-24 PROCEDURE — 88311 DECALCIFY TISSUE: CPT | Performed by: PODIATRIST

## 2017-02-24 PROCEDURE — 74011250637 HC RX REV CODE- 250/637: Performed by: INTERNAL MEDICINE

## 2017-02-24 PROCEDURE — 74011250636 HC RX REV CODE- 250/636

## 2017-02-24 PROCEDURE — 74011000272 HC RX REV CODE- 272: Performed by: PODIATRIST

## 2017-02-24 PROCEDURE — 74011250637 HC RX REV CODE- 250/637: Performed by: SURGERY

## 2017-02-24 PROCEDURE — 87075 CULTR BACTERIA EXCEPT BLOOD: CPT | Performed by: PODIATRIST

## 2017-02-24 PROCEDURE — 74011250636 HC RX REV CODE- 250/636: Performed by: INTERNAL MEDICINE

## 2017-02-24 PROCEDURE — 65270000029 HC RM PRIVATE

## 2017-02-24 PROCEDURE — 77030020782 HC GWN BAIR PAWS FLX 3M -B: Performed by: PODIATRIST

## 2017-02-24 PROCEDURE — 76060000032 HC ANESTHESIA 0.5 TO 1 HR: Performed by: PODIATRIST

## 2017-02-24 PROCEDURE — 76010000138 HC OR TIME 0.5 TO 1 HR: Performed by: PODIATRIST

## 2017-02-24 PROCEDURE — 77030012422 HC DRN WND COVD -A: Performed by: PODIATRIST

## 2017-02-24 PROCEDURE — 88305 TISSUE EXAM BY PATHOLOGIST: CPT | Performed by: PODIATRIST

## 2017-02-24 PROCEDURE — 0Y6S0Z0 DETACHMENT AT LEFT 2ND TOE, COMPLETE, OPEN APPROACH: ICD-10-PCS | Performed by: PODIATRIST

## 2017-02-24 PROCEDURE — 77030002986 HC SUT PROL J&J -A: Performed by: PODIATRIST

## 2017-02-24 PROCEDURE — 74011250637 HC RX REV CODE- 250/637: Performed by: NURSE ANESTHETIST, CERTIFIED REGISTERED

## 2017-02-24 PROCEDURE — 77030011640 HC PAD GRND REM COVD -A: Performed by: PODIATRIST

## 2017-02-24 PROCEDURE — 87070 CULTURE OTHR SPECIMN AEROBIC: CPT | Performed by: PODIATRIST

## 2017-02-24 PROCEDURE — 77030011265 HC ELECTRD BLD HEX COVD -A: Performed by: PODIATRIST

## 2017-02-24 PROCEDURE — 77030006788 HC BLD SAW OSC STRY -B: Performed by: PODIATRIST

## 2017-02-24 PROCEDURE — 74011000250 HC RX REV CODE- 250: Performed by: PODIATRIST

## 2017-02-24 PROCEDURE — 76210000063 HC OR PH I REC FIRST 0.5 HR: Performed by: PODIATRIST

## 2017-02-24 PROCEDURE — 74011000258 HC RX REV CODE- 258: Performed by: INTERNAL MEDICINE

## 2017-02-24 RX ORDER — FAMOTIDINE 20 MG/1
20 TABLET, FILM COATED ORAL ONCE
Status: COMPLETED | OUTPATIENT
Start: 2017-02-24 | End: 2017-02-24

## 2017-02-24 RX ORDER — SODIUM CHLORIDE, SODIUM LACTATE, POTASSIUM CHLORIDE, CALCIUM CHLORIDE 600; 310; 30; 20 MG/100ML; MG/100ML; MG/100ML; MG/100ML
50 INJECTION, SOLUTION INTRAVENOUS CONTINUOUS
Status: DISCONTINUED | OUTPATIENT
Start: 2017-02-24 | End: 2017-02-24 | Stop reason: HOSPADM

## 2017-02-24 RX ORDER — LIDOCAINE HYDROCHLORIDE 20 MG/ML
INJECTION, SOLUTION EPIDURAL; INFILTRATION; INTRACAUDAL; PERINEURAL AS NEEDED
Status: DISCONTINUED | OUTPATIENT
Start: 2017-02-24 | End: 2017-02-24 | Stop reason: HOSPADM

## 2017-02-24 RX ORDER — PROPOFOL 10 MG/ML
INJECTION, EMULSION INTRAVENOUS AS NEEDED
Status: DISCONTINUED | OUTPATIENT
Start: 2017-02-24 | End: 2017-02-24 | Stop reason: HOSPADM

## 2017-02-24 RX ORDER — FENTANYL CITRATE 50 UG/ML
25 INJECTION, SOLUTION INTRAMUSCULAR; INTRAVENOUS
Status: DISCONTINUED | OUTPATIENT
Start: 2017-02-24 | End: 2017-02-24 | Stop reason: HOSPADM

## 2017-02-24 RX ORDER — MIDAZOLAM HYDROCHLORIDE 1 MG/ML
INJECTION, SOLUTION INTRAMUSCULAR; INTRAVENOUS AS NEEDED
Status: DISCONTINUED | OUTPATIENT
Start: 2017-02-24 | End: 2017-02-24 | Stop reason: HOSPADM

## 2017-02-24 RX ORDER — SODIUM CHLORIDE 9 MG/ML
INJECTION, SOLUTION INTRAVENOUS
Status: DISCONTINUED | OUTPATIENT
Start: 2017-02-24 | End: 2017-02-24 | Stop reason: HOSPADM

## 2017-02-24 RX ORDER — SODIUM CHLORIDE 0.9 % (FLUSH) 0.9 %
5-10 SYRINGE (ML) INJECTION AS NEEDED
Status: DISCONTINUED | OUTPATIENT
Start: 2017-02-24 | End: 2017-02-24 | Stop reason: HOSPADM

## 2017-02-24 RX ADMIN — LOVASTATIN 20 MG: 20 TABLET ORAL at 14:03

## 2017-02-24 RX ADMIN — PROPOFOL 30 MG: 10 INJECTION, EMULSION INTRAVENOUS at 13:06

## 2017-02-24 RX ADMIN — OXYCODONE HYDROCHLORIDE AND ACETAMINOPHEN 1 TABLET: 5; 325 TABLET ORAL at 20:53

## 2017-02-24 RX ADMIN — MIDAZOLAM HYDROCHLORIDE 1 MG: 1 INJECTION, SOLUTION INTRAMUSCULAR; INTRAVENOUS at 12:51

## 2017-02-24 RX ADMIN — ISOSORBIDE MONONITRATE 60 MG: 60 TABLET, EXTENDED RELEASE ORAL at 14:03

## 2017-02-24 RX ADMIN — VANCOMYCIN HYDROCHLORIDE 1250 MG: 10 INJECTION, POWDER, LYOPHILIZED, FOR SOLUTION INTRAVENOUS at 20:42

## 2017-02-24 RX ADMIN — VANCOMYCIN HYDROCHLORIDE 1250 MG: 10 INJECTION, POWDER, LYOPHILIZED, FOR SOLUTION INTRAVENOUS at 09:00

## 2017-02-24 RX ADMIN — PIPERACILLIN AND TAZOBACTAM 3.38 G: 3; .375 INJECTION, POWDER, FOR SOLUTION INTRAVENOUS at 18:42

## 2017-02-24 RX ADMIN — PROPOFOL 50 MG: 10 INJECTION, EMULSION INTRAVENOUS at 12:59

## 2017-02-24 RX ADMIN — PIPERACILLIN AND TAZOBACTAM 3.38 G: 3; .375 INJECTION, POWDER, FOR SOLUTION INTRAVENOUS at 00:01

## 2017-02-24 RX ADMIN — CLOPIDOGREL BISULFATE 75 MG: 75 TABLET ORAL at 14:03

## 2017-02-24 RX ADMIN — TAMSULOSIN HYDROCHLORIDE 0.4 MG: 0.4 CAPSULE ORAL at 14:03

## 2017-02-24 RX ADMIN — SODIUM CHLORIDE: 9 INJECTION, SOLUTION INTRAVENOUS at 12:53

## 2017-02-24 RX ADMIN — FAMOTIDINE 20 MG: 20 TABLET ORAL at 12:43

## 2017-02-24 RX ADMIN — PIPERACILLIN AND TAZOBACTAM 3.38 G: 3; .375 INJECTION, POWDER, FOR SOLUTION INTRAVENOUS at 06:11

## 2017-02-24 RX ADMIN — AMLODIPINE BESYLATE 5 MG: 5 TABLET ORAL at 14:03

## 2017-02-24 RX ADMIN — SODIUM CHLORIDE 75 ML/HR: 900 INJECTION, SOLUTION INTRAVENOUS at 06:12

## 2017-02-24 RX ADMIN — OXYBUTYNIN CHLORIDE 5 MG: 5 TABLET ORAL at 14:03

## 2017-02-24 RX ADMIN — MIDAZOLAM HYDROCHLORIDE 1 MG: 1 INJECTION, SOLUTION INTRAMUSCULAR; INTRAVENOUS at 12:53

## 2017-02-24 RX ADMIN — PIPERACILLIN AND TAZOBACTAM 3.38 G: 3; .375 INJECTION, POWDER, FOR SOLUTION INTRAVENOUS at 11:28

## 2017-02-24 NOTE — PROGRESS NOTES
Back on floor from the OR. A/OX4. Denies pain. Surgical dressing to to left foot intact. No resp. distress noted,. Pt's wife at bedside.

## 2017-02-24 NOTE — PERIOP NOTES
TRANSFER - OUT REPORT:    Verbal report given to Moira DUBOIS(name) on Josue  being transferred to [address] [place] [address](unit) for routine post - op       Report consisted of patients Situation, Background, Assessment and   Recommendations(SBAR). Information from the following report(s) SBAR, OR Summary, Procedure Summary, Intake/Output and MAR was reviewed with the receiving nurse. Lines:   Peripheral IV 02/20/17 Right Antecubital (Active)   Site Assessment Clean, dry, & intact 2/24/2017  1:29 PM   Phlebitis Assessment 0 2/24/2017  1:29 PM   Infiltration Assessment 0 2/24/2017  1:29 PM   Dressing Status Clean, dry, & intact 2/24/2017  1:29 PM   Dressing Type Transparent;Tape 2/24/2017  1:29 PM   Hub Color/Line Status Pink; Infusing 2/24/2017  1:29 PM   Alcohol Cap Used No 2/20/2017  8:00 PM        Opportunity for questions and clarification was provided.       Patient transported with:   [employer]

## 2017-02-24 NOTE — ANESTHESIA PREPROCEDURE EVALUATION
Anesthetic History   No history of anesthetic complications            Review of Systems / Medical History  Patient summary reviewed and pertinent labs reviewed    Pulmonary  Within defined limits                 Neuro/Psych   Within defined limits           Cardiovascular    Hypertension: well controlled          CAD      Comments: Echo reviewed. Pt denies recent CP or change in cardiac status. GI/Hepatic/Renal  Within defined limits              Endo/Other      Hypothyroidism: well controlled       Other Findings   Comments: Current Smoker? NO       Elective Surgery? Yes       Abstained from smoking 24 hours prior to anesthesia? N/A    Risk Factors for Postoperative nausea/vomiting:       History of postoperative nausea/vomiting? NO       Female? NO       Motion sickness? NO       Intended opioid administration for postoperative analgesia?   NO           Physical Exam    Airway  Mallampati: II  TM Distance: 4 - 6 cm  Neck ROM: normal range of motion   Mouth opening: Normal     Cardiovascular  Regular rate and rhythm,  S1 and S2 normal,  no murmur, click, rub, or gallop             Dental  No notable dental hx       Pulmonary                 Abdominal         Other Findings            Anesthetic Plan    ASA: 3  Anesthesia type: MAC            Anesthetic plan and risks discussed with: Patient

## 2017-02-24 NOTE — PROGRESS NOTES
Bedside and Verbal shift change report given to [phone] (oncoming nurse) by Kasie Del Angel RN   (offgoing nurse). Report included the following information SBAR, Kardex and MAR.

## 2017-02-24 NOTE — PROGRESS NOTES
0800 Received in bed on contact isolation. A/OX4. No resp. distress noted. C/O pain to left foot 4/10. Pain med offered, pt states, \"I do no need anything. \" Fall precautions. Uses walker for ambulation. Call bell phone within reach. NPO for surgery.

## 2017-02-24 NOTE — BRIEF OP NOTE
BRIEF OPERATIVE NOTE    Date of Procedure: 2/24/2017   Preoperative Diagnosis: * No pre-op diagnosis entered *  Postoperative Diagnosis: * No post-op diagnosis entered *    Procedure(s):  AMPUTATION LEFT SECOND TOE  Surgeon(s) and Role:     * Crispin Lopez DPM - Primary            Surgical Staff:  Circ-1: Lang Patino RN  Scrub Tech-1: [facility]  Surg Asst-1: Elliot Beltran  No case tracking events are documented in the log.   Anesthesia: MAC   Estimated Blood Loss: 0  Specimens: * No specimens in log *   Findings: osteitis toe   Complications: none  Implants: * No implants in log *

## 2017-02-24 NOTE — OP NOTES
[address]    Name:  Venus Emanuel  MR#:  [id number]  :  1935  Account #:  [de-identified]  Date of Adm:  2017  Date of Surgery:  2017      PREOPERATIVE DIAGNOSIS: Abscess and osteomyelitis, left second  toe. POSTOPERATIVE DIAGNOSIS: Abscess and osteomyelitis, left  second toe. PROCEDURES PERFORMED: Amputation of left second toe. SURGEON: Hoda Vazquez DPM    ANESTHESIA: MAC.    ESTIMATED BLOOD LOSS: -    SPECIMENS REMOVED: -    DESCRIPTION OF PROCEDURE: On 2017, the patient was  placed on the operating table in supine position. After adequate  induction of MAC anesthesia, the left lower extremity was prepped and  draped in usual sterile fashion. Attention was directed to the left  second toe. The toe was grossly enlarged in its distal aspect with  redness at its distal aspect and with a draining sinus tract with pus. Clean margins were noted proximally. Two semi-elliptical incisions  were accomplished around the base of the toe and careful dissection  along the bone disarticulating it from the joint was accomplished. There  was intact medial and lateral neurovascular structures. Small blood  vessels were bovied as necessary. Bone was sent for culture from the  toe as well as pathology. Proximal wound was cultured. Wound was  thoroughly irrigated with antibiotic solution and closed with Prolene  suture and a Betadine soft dressing. The patient tolerated the  procedure and anesthesia well and was stable throughout. The patient  was transported to the recovery room in stable condition.         Marelyn Merlin, DPM Estell [facility]cairn / Llana Labrador  D:  2017   13:18  T:  2017   13:43  Job #:  [id number]

## 2017-02-24 NOTE — PROGRESS NOTES
SUBJECTIVE:    Patient sitting in bed in NAD, awake, alert, wife at bedside    OBJECTIVE:    Visit Vitals    /54    Pulse 61    Temp 98.3 °F (36.8 °C)    Resp 14    Ht 6' (1.829 m)    Wt 76.5 kg (168 lb 10.4 oz)    SpO2 100%    BMI 22.87 kg/m2     General:  Awake, alert  Cardiovascular:  S1S2+, RRR  Pulmonary:  CTA b/l  GI:  Soft, BS+, NT, ND  Extremities:  Left foot in dressing      ASSESSMENT:    1. Left second finger OM due to PVD  2. Hypertension. 3. Peripheral vascular disease. 4. Coronary artery disease. 5. History of rectal cancer status post chemo and radiation in 2013.   6. Dyslipidemia    PLAN:    S/p toe amputation by podiatry  Vascular on case, on plavix  abx per ID, f/u Cx  On norvasc  On synthyroid  D/w patient and wife      CMP:   No results found for: NA, K, CL, CO2, AGAP, GLU, BUN, CREA, GFRAA, GFRNA, CA, MG, PHOS, ALB, TBIL, TP, ALB, GLOB, AGRAT, SGOT, ALT, GPT  CBC:   No results found for: WBC, HGB, HGBEXT, HCT, HCTEXT, PLT, PLTEXT, HGBEXT, HCTEXT, PLTEXT      Caprice Guevara MD

## 2017-02-24 NOTE — PROGRESS NOTES
Pt is s/p left leg intervention  For toe amp today  When cleared for d/c he will need outpatient follow up at our office

## 2017-02-25 LAB
ANION GAP BLD CALC-SCNC: 3 MMOL/L (ref 3–18)
BACTERIA SPEC CULT: ABNORMAL
BUN SERPL-MCNC: 16 MG/DL (ref 7–18)
BUN/CREAT SERPL: 14 (ref 12–20)
CALCIUM SERPL-MCNC: 8.6 MG/DL (ref 8.5–10.1)
CHLORIDE SERPL-SCNC: 109 MMOL/L (ref 100–108)
CO2 SERPL-SCNC: 28 MMOL/L (ref 21–32)
CREAT SERPL-MCNC: 1.18 MG/DL (ref 0.6–1.3)
GLUCOSE SERPL-MCNC: 92 MG/DL (ref 74–99)
GRAM STN SPEC: ABNORMAL
GRAM STN SPEC: ABNORMAL
POTASSIUM SERPL-SCNC: 4.3 MMOL/L (ref 3.5–5.5)
SERVICE CMNT-IMP: ABNORMAL
SODIUM SERPL-SCNC: 140 MMOL/L (ref 136–145)

## 2017-02-25 PROCEDURE — 65270000029 HC RM PRIVATE

## 2017-02-25 PROCEDURE — 74011250637 HC RX REV CODE- 250/637: Performed by: SURGERY

## 2017-02-25 PROCEDURE — 74011250637 HC RX REV CODE- 250/637: Performed by: INTERNAL MEDICINE

## 2017-02-25 PROCEDURE — 80048 BASIC METABOLIC PNL TOTAL CA: CPT | Performed by: INTERNAL MEDICINE

## 2017-02-25 PROCEDURE — 36415 COLL VENOUS BLD VENIPUNCTURE: CPT | Performed by: INTERNAL MEDICINE

## 2017-02-25 PROCEDURE — 74011250636 HC RX REV CODE- 250/636: Performed by: INTERNAL MEDICINE

## 2017-02-25 PROCEDURE — 74011000258 HC RX REV CODE- 258: Performed by: INTERNAL MEDICINE

## 2017-02-25 RX ADMIN — OXYCODONE HYDROCHLORIDE AND ACETAMINOPHEN 1 TABLET: 5; 325 TABLET ORAL at 01:06

## 2017-02-25 RX ADMIN — PIPERACILLIN AND TAZOBACTAM 3.38 G: 3; .375 INJECTION, POWDER, FOR SOLUTION INTRAVENOUS at 00:48

## 2017-02-25 RX ADMIN — DAKIN'S SOLUTION 0.125% (QUARTER STRENGTH): 0.12 SOLUTION at 18:00

## 2017-02-25 RX ADMIN — VANCOMYCIN HYDROCHLORIDE 1250 MG: 10 INJECTION, POWDER, LYOPHILIZED, FOR SOLUTION INTRAVENOUS at 14:47

## 2017-02-25 RX ADMIN — TAMSULOSIN HYDROCHLORIDE 0.4 MG: 0.4 CAPSULE ORAL at 09:54

## 2017-02-25 RX ADMIN — PIPERACILLIN AND TAZOBACTAM 3.38 G: 3; .375 INJECTION, POWDER, FOR SOLUTION INTRAVENOUS at 05:29

## 2017-02-25 RX ADMIN — OXYBUTYNIN CHLORIDE 5 MG: 5 TABLET ORAL at 09:54

## 2017-02-25 RX ADMIN — PIPERACILLIN AND TAZOBACTAM 3.38 G: 3; .375 INJECTION, POWDER, FOR SOLUTION INTRAVENOUS at 18:00

## 2017-02-25 RX ADMIN — ISOSORBIDE MONONITRATE 60 MG: 60 TABLET, EXTENDED RELEASE ORAL at 09:53

## 2017-02-25 RX ADMIN — CLOPIDOGREL BISULFATE 75 MG: 75 TABLET ORAL at 09:54

## 2017-02-25 RX ADMIN — LOVASTATIN 20 MG: 20 TABLET ORAL at 09:54

## 2017-02-25 RX ADMIN — AMLODIPINE BESYLATE 5 MG: 5 TABLET ORAL at 09:54

## 2017-02-25 RX ADMIN — LEVOTHYROXINE SODIUM 88 MCG: 88 TABLET ORAL at 07:47

## 2017-02-25 RX ADMIN — OXYCODONE HYDROCHLORIDE AND ACETAMINOPHEN 1 TABLET: 5; 325 TABLET ORAL at 21:40

## 2017-02-25 RX ADMIN — PIPERACILLIN AND TAZOBACTAM 3.38 G: 3; .375 INJECTION, POWDER, FOR SOLUTION INTRAVENOUS at 11:42

## 2017-02-25 NOTE — PROGRESS NOTES
SUBJECTIVE:    Patient in NAD, awake, alert    OBJECTIVE:    Visit Vitals    /54 (BP 1 Location: Left arm, BP Patient Position: At rest)    Pulse 63    Temp 98.3 °F (36.8 °C)    Resp 18    Ht 6' (1.829 m)    Wt 76.5 kg (168 lb 10.4 oz)    SpO2 95%    BMI 22.87 kg/m2     General:  Awake, alert  Cardiovascular:  S1S2+, RRR  Pulmonary:  CTA b/l  GI:  Soft, BS+, NT, ND  Extremities:  Left foot in dressing      ASSESSMENT:    1. Left second finger OM due to PVD  2. Hypertension. 3. Peripheral vascular disease. 4. Coronary artery disease. 5. History of rectal cancer status post chemo and radiation in 2013.   6. Dyslipidemia    PLAN:    S/p Toe amputation  Vascular on case, plavix  abx per ID, f/u Cx  On norvasc  On synthyroid  Medically stable, will sign off, d/w Dr Chelo Higgins  D/w patient      CMP:   Lab Results   Component Value Date/Time     02/25/2017 03:20 AM    K 4.3 02/25/2017 03:20 AM     (H) 02/25/2017 03:20 AM    CO2 28 02/25/2017 03:20 AM    AGAP 3 02/25/2017 03:20 AM    GLU 92 02/25/2017 03:20 AM    BUN 16 02/25/2017 03:20 AM    CREA 1.18 02/25/2017 03:20 AM    GFRAA >60 02/25/2017 03:20 AM    GFRNA 60 (L) 02/25/2017 03:20 AM    CA 8.6 02/25/2017 03:20 AM     CBC:   No results found for: WBC, HGB, HGBEXT, HCT, HCTEXT, PLT, PLTEXT, HGBEXT, HCTEXT, PLTEXT      Hilda Costello MD

## 2017-02-25 NOTE — ROUTINE PROCESS
Received patient lying in bed awake alert and orient x 4. Patient denies pain and discomfort at this time. Dressing to left foot clean dryand intact. Surgical shoe on left foot. Circulation intact. Call light in reach. instructedto call for assistance.

## 2017-02-25 NOTE — PROGRESS NOTES
Seen at bedside awake and alert  Dressing clean and intact. Stable after surgery. Continue with present care.

## 2017-02-25 NOTE — PROGRESS NOTES
Bedside and verbal report given to Houston Brizuela RN (on coming nurse) by Ramin Messina LPN (off going nurse). Report included SBAR, MAR, Kardex, and recent results. Opportunity for questions/clarification provided.

## 2017-02-25 NOTE — ANESTHESIA POSTPROCEDURE EVALUATION
Post-Anesthesia Evaluation and Assessment    Patient: Nancy Jones MRN: [id number]  SSN: [id number]    YOB: 1935  Age: 80 y.o. Sex: male       Cardiovascular Function/Vital Signs  Visit Vitals    /53 (BP 1 Location: Left arm, BP Patient Position: At rest)    Pulse 69    Temp 36.7 °C (98.1 °F)    Resp 20    Ht 6' (1.829 m)    Wt 76.5 kg (168 lb 10.4 oz)    SpO2 97%    BMI 22.87 kg/m2       Patient is status post MAC anesthesia for Procedure(s):  AMPUTATION LEFT SECOND TOE. Nausea/Vomiting: None    Postoperative hydration reviewed and adequate. Pain:  Pain Scale 1: Numeric (0 - 10) (02/24/17 1341)  Pain Intensity 1: 0 (02/24/17 1341)   Managed    Neurological Status:   Neuro (WDL): Within Defined Limits (02/24/17 1329)   At baseline    Mental Status and Level of Consciousness: Arousable    Pulmonary Status:   O2 Device: Room air (02/24/17 1329)   Adequate oxygenation and airway patent    Complications related to anesthesia: None    Post-anesthesia assessment completed.  No concerns    Signed By: Tapan Cooper MD     February 24, 2017

## 2017-02-26 LAB
ANION GAP BLD CALC-SCNC: 7 MMOL/L (ref 3–18)
BACTERIA SPEC CULT: NORMAL
BACTERIA SPEC CULT: NORMAL
BASOPHILS # BLD AUTO: 0 K/UL (ref 0–0.1)
BASOPHILS # BLD: 1 % (ref 0–2)
BUN SERPL-MCNC: 15 MG/DL (ref 7–18)
BUN/CREAT SERPL: 12 (ref 12–20)
CALCIUM SERPL-MCNC: 9.2 MG/DL (ref 8.5–10.1)
CHLORIDE SERPL-SCNC: 109 MMOL/L (ref 100–108)
CO2 SERPL-SCNC: 26 MMOL/L (ref 21–32)
CREAT SERPL-MCNC: 1.27 MG/DL (ref 0.6–1.3)
DIFFERENTIAL METHOD BLD: ABNORMAL
EOSINOPHIL # BLD: 0.3 K/UL (ref 0–0.4)
EOSINOPHIL NFR BLD: 5 % (ref 0–5)
ERYTHROCYTE [DISTWIDTH] IN BLOOD BY AUTOMATED COUNT: 15.9 % (ref 11.6–14.5)
GLUCOSE SERPL-MCNC: 95 MG/DL (ref 74–99)
HCT VFR BLD AUTO: 31.6 % (ref 36–48)
HGB BLD-MCNC: 10 G/DL (ref 13–16)
LYMPHOCYTES # BLD AUTO: 19 % (ref 21–52)
LYMPHOCYTES # BLD: 1.1 K/UL (ref 0.9–3.6)
MCH RBC QN AUTO: 28.5 PG (ref 24–34)
MCHC RBC AUTO-ENTMCNC: 31.6 G/DL (ref 31–37)
MCV RBC AUTO: 90 FL (ref 74–97)
MONOCYTES # BLD: 0.8 K/UL (ref 0.05–1.2)
MONOCYTES NFR BLD AUTO: 15 % (ref 3–10)
NEUTS SEG # BLD: 3.4 K/UL (ref 1.8–8)
NEUTS SEG NFR BLD AUTO: 60 % (ref 40–73)
PLATELET # BLD AUTO: 318 K/UL (ref 135–420)
PMV BLD AUTO: 9.4 FL (ref 9.2–11.8)
POTASSIUM SERPL-SCNC: 3.8 MMOL/L (ref 3.5–5.5)
RBC # BLD AUTO: 3.51 M/UL (ref 4.7–5.5)
SERVICE CMNT-IMP: NORMAL
SERVICE CMNT-IMP: NORMAL
SODIUM SERPL-SCNC: 142 MMOL/L (ref 136–145)
WBC # BLD AUTO: 5.7 K/UL (ref 4.6–13.2)

## 2017-02-26 PROCEDURE — 74011250637 HC RX REV CODE- 250/637: Performed by: INTERNAL MEDICINE

## 2017-02-26 PROCEDURE — 74011000258 HC RX REV CODE- 258: Performed by: INTERNAL MEDICINE

## 2017-02-26 PROCEDURE — 74011250636 HC RX REV CODE- 250/636: Performed by: INTERNAL MEDICINE

## 2017-02-26 PROCEDURE — 36415 COLL VENOUS BLD VENIPUNCTURE: CPT | Performed by: INTERNAL MEDICINE

## 2017-02-26 PROCEDURE — 80048 BASIC METABOLIC PNL TOTAL CA: CPT | Performed by: INTERNAL MEDICINE

## 2017-02-26 PROCEDURE — 85025 COMPLETE CBC W/AUTO DIFF WBC: CPT | Performed by: INTERNAL MEDICINE

## 2017-02-26 PROCEDURE — 74011250637 HC RX REV CODE- 250/637: Performed by: SURGERY

## 2017-02-26 PROCEDURE — 65270000029 HC RM PRIVATE

## 2017-02-26 RX ADMIN — ISOSORBIDE MONONITRATE 60 MG: 60 TABLET, EXTENDED RELEASE ORAL at 08:50

## 2017-02-26 RX ADMIN — PIPERACILLIN AND TAZOBACTAM 3.38 G: 3; .375 INJECTION, POWDER, FOR SOLUTION INTRAVENOUS at 00:33

## 2017-02-26 RX ADMIN — PIPERACILLIN AND TAZOBACTAM 3.38 G: 3; .375 INJECTION, POWDER, FOR SOLUTION INTRAVENOUS at 17:46

## 2017-02-26 RX ADMIN — AMLODIPINE BESYLATE 5 MG: 5 TABLET ORAL at 08:50

## 2017-02-26 RX ADMIN — PIPERACILLIN AND TAZOBACTAM 3.38 G: 3; .375 INJECTION, POWDER, FOR SOLUTION INTRAVENOUS at 11:25

## 2017-02-26 RX ADMIN — SODIUM CHLORIDE 75 ML/HR: 900 INJECTION, SOLUTION INTRAVENOUS at 03:59

## 2017-02-26 RX ADMIN — CLOPIDOGREL BISULFATE 75 MG: 75 TABLET ORAL at 08:50

## 2017-02-26 RX ADMIN — TAMSULOSIN HYDROCHLORIDE 0.4 MG: 0.4 CAPSULE ORAL at 08:50

## 2017-02-26 RX ADMIN — LOVASTATIN 20 MG: 20 TABLET ORAL at 08:51

## 2017-02-26 RX ADMIN — OXYBUTYNIN CHLORIDE 5 MG: 5 TABLET ORAL at 08:50

## 2017-02-26 RX ADMIN — VANCOMYCIN HYDROCHLORIDE 1250 MG: 10 INJECTION, POWDER, LYOPHILIZED, FOR SOLUTION INTRAVENOUS at 11:43

## 2017-02-26 RX ADMIN — OXYCODONE HYDROCHLORIDE AND ACETAMINOPHEN 1 TABLET: 5; 325 TABLET ORAL at 21:21

## 2017-02-26 RX ADMIN — PIPERACILLIN AND TAZOBACTAM 3.38 G: 3; .375 INJECTION, POWDER, FOR SOLUTION INTRAVENOUS at 05:26

## 2017-02-26 RX ADMIN — LEVOTHYROXINE SODIUM 88 MCG: 88 TABLET ORAL at 08:50

## 2017-02-26 NOTE — ROUTINE PROCESS
Received patient lying in bed awake alert and orient x 4. Patient denies pain at this time. Dressing to left foot clean dry and intact. surgical shoe on left foot. Good circulation to LLE. Respiration even and unlabored. No apparent distress noted. Call light in reach. Instructed to call for assistance.

## 2017-02-26 NOTE — PROGRESS NOTES
Seen at bedside awake and alert. No complaints. Inspected left foot wound. Clean, well approximated and no redness or drainage. No erythema. Redressed. Stable. Consider for discharge in AM if cleared by ID.

## 2017-02-27 ENCOUNTER — APPOINTMENT (OUTPATIENT)
Dept: INTERVENTIONAL RADIOLOGY/VASCULAR | Age: 82
DRG: 253 | End: 2017-02-27
Attending: INTERNAL MEDICINE
Payer: MEDICARE

## 2017-02-27 LAB
ANION GAP BLD CALC-SCNC: 7 MMOL/L (ref 3–18)
BACTERIA SPEC CULT: NORMAL
BUN SERPL-MCNC: 16 MG/DL (ref 7–18)
BUN/CREAT SERPL: 14 (ref 12–20)
CALCIUM SERPL-MCNC: 8.8 MG/DL (ref 8.5–10.1)
CHLORIDE SERPL-SCNC: 105 MMOL/L (ref 100–108)
CO2 SERPL-SCNC: 28 MMOL/L (ref 21–32)
CREAT SERPL-MCNC: 1.14 MG/DL (ref 0.6–1.3)
DATE LAST DOSE: NORMAL
GLUCOSE SERPL-MCNC: 99 MG/DL (ref 74–99)
GRAM STN SPEC: NORMAL
GRAM STN SPEC: NORMAL
POTASSIUM SERPL-SCNC: 3.9 MMOL/L (ref 3.5–5.5)
REPORTED DOSE,DOSE: NORMAL UNITS
REPORTED DOSE/TIME,TMG: 900
SERVICE CMNT-IMP: NORMAL
SODIUM SERPL-SCNC: 140 MMOL/L (ref 136–145)
VANCOMYCIN TROUGH SERPL-MCNC: 19.3 UG/ML (ref 10–20)

## 2017-02-27 PROCEDURE — 80202 ASSAY OF VANCOMYCIN: CPT | Performed by: INTERNAL MEDICINE

## 2017-02-27 PROCEDURE — 74011000258 HC RX REV CODE- 258: Performed by: INTERNAL MEDICINE

## 2017-02-27 PROCEDURE — 65270000029 HC RM PRIVATE

## 2017-02-27 PROCEDURE — 80048 BASIC METABOLIC PNL TOTAL CA: CPT | Performed by: INTERNAL MEDICINE

## 2017-02-27 PROCEDURE — 36415 COLL VENOUS BLD VENIPUNCTURE: CPT | Performed by: INTERNAL MEDICINE

## 2017-02-27 PROCEDURE — 74011250636 HC RX REV CODE- 250/636: Performed by: INTERNAL MEDICINE

## 2017-02-27 PROCEDURE — 74011250637 HC RX REV CODE- 250/637: Performed by: SURGERY

## 2017-02-27 PROCEDURE — 74011250637 HC RX REV CODE- 250/637: Performed by: INTERNAL MEDICINE

## 2017-02-27 RX ORDER — LOPERAMIDE HYDROCHLORIDE 2 MG/1
2 CAPSULE ORAL
Status: DISCONTINUED | OUTPATIENT
Start: 2017-02-27 | End: 2017-02-28 | Stop reason: HOSPADM

## 2017-02-27 RX ADMIN — OXYBUTYNIN CHLORIDE 5 MG: 5 TABLET ORAL at 10:48

## 2017-02-27 RX ADMIN — PIPERACILLIN AND TAZOBACTAM 3.38 G: 3; .375 INJECTION, POWDER, FOR SOLUTION INTRAVENOUS at 00:15

## 2017-02-27 RX ADMIN — LOPERAMIDE HYDROCHLORIDE 2 MG: 2 CAPSULE ORAL at 16:39

## 2017-02-27 RX ADMIN — LEVOTHYROXINE SODIUM 88 MCG: 88 TABLET ORAL at 10:48

## 2017-02-27 RX ADMIN — PIPERACILLIN AND TAZOBACTAM 3.38 G: 3; .375 INJECTION, POWDER, FOR SOLUTION INTRAVENOUS at 08:21

## 2017-02-27 RX ADMIN — VANCOMYCIN HYDROCHLORIDE 1250 MG: 10 INJECTION, POWDER, LYOPHILIZED, FOR SOLUTION INTRAVENOUS at 03:23

## 2017-02-27 RX ADMIN — LOVASTATIN 20 MG: 20 TABLET ORAL at 10:49

## 2017-02-27 RX ADMIN — OXYCODONE HYDROCHLORIDE AND ACETAMINOPHEN 1 TABLET: 5; 325 TABLET ORAL at 21:09

## 2017-02-27 RX ADMIN — AMLODIPINE BESYLATE 5 MG: 5 TABLET ORAL at 10:49

## 2017-02-27 RX ADMIN — CLOPIDOGREL BISULFATE 75 MG: 75 TABLET ORAL at 10:48

## 2017-02-27 RX ADMIN — VANCOMYCIN HYDROCHLORIDE 1250 MG: 10 INJECTION, POWDER, LYOPHILIZED, FOR SOLUTION INTRAVENOUS at 21:06

## 2017-02-27 RX ADMIN — DAKIN'S SOLUTION 0.125% (QUARTER STRENGTH): 0.12 SOLUTION at 16:40

## 2017-02-27 RX ADMIN — TAMSULOSIN HYDROCHLORIDE 0.4 MG: 0.4 CAPSULE ORAL at 10:48

## 2017-02-27 RX ADMIN — ISOSORBIDE MONONITRATE 60 MG: 60 TABLET, EXTENDED RELEASE ORAL at 10:49

## 2017-02-27 RX ADMIN — DAKIN'S SOLUTION 0.125% (QUARTER STRENGTH): 0.12 SOLUTION at 10:49

## 2017-02-27 RX ADMIN — PIPERACILLIN AND TAZOBACTAM 3.38 G: 3; .375 INJECTION, POWDER, LYOPHILIZED, FOR SOLUTION INTRAVENOUS; PARENTERAL at 23:06

## 2017-02-27 NOTE — PROGRESS NOTES
Transport came to pick pt up for picc line. Pt states he just had diaarhea all over self and need 30 minutes to get cleaned up. Page out to Dr. Renuka Wakefield for some probiotics. Pt refused to let staff help him get cleaned up.

## 2017-02-27 NOTE — DISCHARGE SUMMARY
Discharge Summary     Patient: Marisol Cotto MRN: [id number]  SSN: [id number]    YOB: 1935  Age: 80 y.o. Sex: male       Admit Date: 2/20/2017    Discharge Date: 2/27/2017      Admission Diagnoses: Osteomyelitis [facility])  DX    Discharge Diagnoses:   Problem List as of 2/27/2017  Date Reviewed: 2/24/2017          Codes Class Noted - Resolved    Osteomyelitis ([address] ICD-10-CM: M86.9  ICD-9-CM: 730.20  2/20/2017 - Present        Essential hypertension ICD-10-CM: I10  ICD-9-CM: 401.9  11/21/2016 - Present        Open wound of left heel ICD-10-CM: S91.302A  ICD-9-CM: 892.0  7/13/2016 - Present        Open wound of right lower leg ICD-10-CM: S81.801A  ICD-9-CM: 891.0  7/13/2016 - Present        Advance care planning ICD-10-CM: Z71.89  ICD-9-CM: V65.49  5/11/2016 - Present    Overview Signed 5/11/2016 10:12 AM by Chio Flannery MD     The patient was advised and counseled regarding advanced directives. The patient was provided with an information packed and an advanced directive form. Vitamin D insufficiency (Chronic) ICD-10-CM: E55.9  ICD-9-CM: 268.9  4/16/2016 - Present    Overview Signed 4/18/2016  3:29 PM by Dodie Jj MD     Vitamin D 25-Hydroxy (4/16/2016) = 25.6             Benign hypertension without congestive heart failure (Chronic) ICD-10-CM: I10  ICD-9-CM: 401.1  Unknown - Present        Diastolic dysfunction without heart failure (Chronic) ICD-10-CM: I51.9  ICD-9-CM: 429.9  Unknown - Present        Dyslipidemia (Chronic) ICD-10-CM: E78.5  ICD-9-CM: 272.4  Unknown - Present        Peripheral vascular disease (HCC) (Chronic) ICD-10-CM: I73.9  ICD-9-CM: 443.9  Unknown - Present        Spinal stenosis of lumbar region (Chronic) ICD-10-CM: M48.06  ICD-9-CM: 724.02  Unknown - Present        Chronic anemia (Chronic) ICD-10-CM: D64.9  ICD-9-CM: 285. 9  Unknown - Present        Hypothyroidism (Chronic) ICD-10-CM: E03.9  ICD-9-CM: 244.9  Unknown - Present        History of peptic ulcer disease (Chronic) ICD-10-CM: Z87.11  ICD-9-CM: V12.71  Unknown - Present        History of left-sided carotid endarterectomy ICD-10-CM: Z98.890  ICD-9-CM: V45.89  Unknown - Present        Hypotension due to blood loss ICD-10-CM: I95.89  ICD-9-CM: 458.8  4/9/2016 - Present    Overview Signed 4/15/2016  1:46 PM by Jonnie Sam MD     Attributed to lower GI bleed             History of lower GI bleeding ICD-10-CM: Z87.19  ICD-9-CM: V12.79  4/9/2016 - Present        Anemia due to acute blood loss ICD-10-CM: D62  ICD-9-CM: 285.1  4/9/2016 - Present    Overview Signed 4/15/2016  1:45 PM by Jonnie Sam MD     Attributed to lower GI bleed             Generalized weakness ICD-10-CM: R53.1  ICD-9-CM: 780.79  4/9/2016 - Present        Impaired mobility and ADLs ICD-10-CM: Z74.09  ICD-9-CM: 799.89  4/9/2016 - Present        Bleeding risk due to aspirin ICD-10-CM: Z79.82  ICD-9-CM: V49.89  4/9/2016 - Present    Overview Signed 4/15/2016  2:13 PM by Jonnie Sam MD     Aspirin + Clopidogrel             Non-ST elevation myocardial infarction (NSTEMI), subsequent episode of care [facility]) ICD-10-CM: I21.4  ICD-9-CM: 410.72  4/9/2016 - Present        Decreased calculated glomerular filtration rate (GFR) ICD-10-CM: R94.4  ICD-9-CM: 794.4  4/9/2016 - Present    Overview Signed 4/15/2016  1:49 PM by Jonnie Sam MD     Calculated GFR equivalent to that of CKD stage 2 = 60-89 ml/min             Need for isolation ICD-10-CM: Z41.8  ICD-9-CM: V07.0  4/9/2016 - Present    Overview Signed 4/15/2016  1:52 PM by Jonnie Sam MD     Contact Isolation for history of MRSA infection (12/28/2015)             Acute encephalopathy ICD-10-CM: G93.40  ICD-9-CM: 348.30  4/9/2016 - Present        Acute kidney injury [facility] ICD-10-CM: N17.9  ICD-9-CM: 584.9  4/9/2016 - Present        Aftercare following surgery of the musculoskeletal system ICD-10-CM: Z47.89  ICD-9-CM: V58.78  3/24/2016 - Present    Overview Signed 4/15/2016  2:19 PM by Yonathan Robin MD     S/P WoundVAC placement on right leg (4/13/2016); History of right lower extremity 4-compartment fasciotomy (3/18/2016) with initial [address] placed on 3/24/2016               Ischemic rest pain of lower extremity ([facility] ICD-10-CM: I73.9  ICD-9-CM: 443.9  3/18/2016 - Present        Carotid artery disease (HCC) (Chronic) ICD-10-CM: I77.9  ICD-9-CM: 447.9  3/8/2016 - Present        History of MRSA infection ICD-10-CM: Z86.14  ICD-9-CM: V12.04  12/28/2015 - Present    Overview Signed 4/15/2016  1:51 PM by Yonathan Robin MD     Culture of surgical wound from left great toe (12/28/2015): POSITIVE for MRSA             Neuropathy ICD-10-CM: G62.9  ICD-9-CM: 355.9  11/18/2015 - Present        Fracture of neck of femur ([facility] ICD-10-CM: S72.009A  ICD-9-CM: 820.8  10/7/2015 - Present        Acetabular fracture ([facility] ICD-10-CM: K82.626N  ICD-9-CM: 808.0  9/24/2015 - Present        History of malignant neoplasm of anus ICD-10-CM: Z85.048  ICD-9-CM: V10.06  1/1/2013 - Present    Overview Signed 4/15/2016  1:48 PM by Yonathan Robin MD     Squamous cell carcinoma of the anus; 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 HAD VASCULAR PROCEDURE,IV ANTIBIOTICS,LEFT SECOND TOE AMPUTATION. improved WITH CLEARING INFECTION BY 2/27/17. ambulates WITH PARTIAL WEIGHT AND A WALKER ON LEFT FOOT. normal VOIDING AND BOWEL MOVEMENTS. TAKING FOOD AND FLUIDS. WILL DISCHARGE ONCE CLEARED BY ID . Consults: Infectious Disease    Significant Diagnostic Studies: radiology: X-Ray: INFECTED FOOT.     Disposition: home    Discharge Medications:   Current Discharge Medication List      CONTINUE these medications which have NOT CHANGED    Details   levothyroxine (SYNTHROID) 88 mcg tablet TAKE ONE TABLET BY MOUTH ONCE DAILY BEFORE BREAKFAST  Qty: 90 Tab, Refills: 3    Associated Diagnoses: Hypothyroidism, unspecified type      amLODIPine (NORVASC) 5 mg tablet TAKE ONE TABLET BY MOUTH ONCE DAILY  Qty: 90 Tab, Refills: 3    Associated Diagnoses: Essential hypertension      isosorbide mononitrate ER (IMDUR) 60 mg CR tablet 1 tablet each AM  Qty: 90 Tab, Refills: 3      clopidogrel (PLAVIX) 75 mg tab Take 1 Tab by mouth daily (with dinner). Qty: 90 Tab, Refills: 6      lovastatin (MEVACOR) 20 mg tablet Take 1 Tab by mouth daily. Qty: 90 Tab, Refills: 3      oxyCODONE-acetaminophen (PERCOCET 7.5) 7.5-325 mg per tablet 1 tablet every six hours as needed for pain  Qty: 120 Tab, Refills: 0      oxybutynin (DITROPAN) 5 mg tablet Take 1 Tab by mouth three (3) times daily. Indications: BLADDER HYPERACTIVITY, URINARY URGENCY  Qty: 90 Tab, Refills: 6    Associated Diagnoses: Dysuria; Benign prostatic hyperplasia, presence of lower urinary tract symptoms unspecified, unspecified morphology; Radiation cystitis      multivitamin with iron tablet Take 1 Tab by mouth daily. POLYETHYLENE GLYCOL 3350 (MIRALAX PO) Take  by mouth as needed. tamsulosin (FLOMAX) 0.4 mg capsule 1 tablet daily at supper  Qty: 30 Cap, Refills: 6      aspirin 81 mg chewable tablet Take 1 Tab by mouth daily. Qty: 30 Tab, Refills: 0         STOP taking these medications       cephALEXin (KEFLEX) 500 mg capsule Comments:   Reason for Stopping:         trimethoprim-sulfamethoxazole (BACTRIM DS) 160-800 mg per tablet Comments:   Reason for Stopping:               Activity: Bedrest  Diet: Diabetic Diet  Wound Care: As directed    Follow-up Appointments   Procedures    FOLLOW UP VISIT Appointment in: One Week     Standing Status:   Standing     Number of Occurrences:   1     Order Specific Question:   Appointment in     Answer: One Week       Signed By: Gigi Barahona DPM     February 27, 2017     Cleared for discharge today. Got PIC line yesterday.

## 2017-02-27 NOTE — PROGRESS NOTES
This writer call Gabbie Tucker who reports that this pt has Part D of medicare which acts as a supplement agent for I.V, she is check for a cost for this pt, who only wants to go home if he cannot be placed in ARU, who has refused this pt due the lack of a rehab need.

## 2017-02-27 NOTE — PROGRESS NOTES
Infectious Disease progress Note    Requested by: Dr. Lesia Councilman    Reason:     Current abx Prior abx   Vancomycin since 2/20  Pip/tazo since 2/22 Ceftriaxone 2/20     Lines:       Assessment :    80 y.o. male with h/o anemia, GI bleed, HTN, dyslipidemia, vitamin D deficiency, hypercholesterolemia and cancer who presented to ED on 2/20 complaining of second toe pain, edema and erythema on the L foot onset 4 days ago    prior cultures reveal pseudomonas in wound culture right leg, left heel in 7/2016; mrsa left great toe in 12/2015    Clinical picture consistent with left second toe abscess, left second toe distal tip chronic osteomyelitis, left foot cellulitis in setting of peripheral arterial disease. Patient has been appropriately managed with vascular intervention on 2/21. Exact microbial etiology of infection is not known. Chronicity of illness likely suggest polymicrobial infection with gram negatives/gram positives such as mrsa. Wound cultures 2/22: negative. S/p left second toe amputation on 2/24 - intra op cultures negative. Prior outpatient trimeth/sulfa and ciprofloxacin likely masking the cultures. Patient had definitive purulence of left second toe prior to surgery. High risk of acute osteomyelitis of the clean margins. Recommendations:    1. Continue vancomycin, pip/tazo tentatively till 4/7  2. Will f/u on biopsy of clean bone margins  3. Will shorten the duration of antibiotics if biopsy of clean margins negative for osteomyelitis and quick healing noted. Above plan was discussed in details with patient, and . ll questions answered to their full satisfaction. Spent additional 35 minutes in management and evaluation of this patient. >50% time spent in counselling and coordination of care. Will d/w  dr Lesia Councilman. Please call me if any further questions or concerns. Will continue to participate in the care of this patient. subjective:      Currently patient feels better. Patient denies headaches, visual disturbances, sore throat, runny nose, earaches, cp, sob, chills, cough, abdominal pain, diarrhea, burning micturition,  or weakness in extremities. He denies back pain/flank pain. home Medication List    Details   cephALEXin (KEFLEX) 500 mg capsule Take 1 Cap by mouth four (4) times daily for 7 days. Qty: 28 Cap, Refills: 0      levothyroxine (SYNTHROID) 88 mcg tablet TAKE ONE TABLET BY MOUTH ONCE DAILY BEFORE BREAKFAST  Qty: 90 Tab, Refills: 3    Associated Diagnoses: Hypothyroidism, unspecified type      amLODIPine (NORVASC) 5 mg tablet TAKE ONE TABLET BY MOUTH ONCE DAILY  Qty: 90 Tab, Refills: 3    Associated Diagnoses: Essential hypertension      isosorbide mononitrate ER (IMDUR) 60 mg CR tablet 1 tablet each AM  Qty: 90 Tab, Refills: 3      clopidogrel (PLAVIX) 75 mg tab Take 1 Tab by mouth daily (with dinner). Qty: 90 Tab, Refills: 6      lovastatin (MEVACOR) 20 mg tablet Take 1 Tab by mouth daily. Qty: 90 Tab, Refills: 3      oxyCODONE-acetaminophen (PERCOCET 7.5) 7.5-325 mg per tablet 1 tablet every six hours as needed for pain  Qty: 120 Tab, Refills: 0      oxybutynin (DITROPAN) 5 mg tablet Take 1 Tab by mouth three (3) times daily. Indications: BLADDER HYPERACTIVITY, URINARY URGENCY  Qty: 90 Tab, Refills: 6    Associated Diagnoses: Dysuria; Benign prostatic hyperplasia, presence of lower urinary tract symptoms unspecified, unspecified morphology; Radiation cystitis      multivitamin with iron tablet Take 1 Tab by mouth daily. POLYETHYLENE GLYCOL 3350 (MIRALAX PO) Take  by mouth as needed. tamsulosin (FLOMAX) 0.4 mg capsule 1 tablet daily at supper  Qty: 30 Cap, Refills: 6      aspirin 81 mg chewable tablet Take 1 Tab by mouth daily. Qty: 30 Tab, Refills: 0      trimethoprim-sulfamethoxazole (BACTRIM DS) 160-800 mg per tablet Take 1 Tab by mouth two (2) times a day for 7 days.   Qty: 14 Tab, Refills: 0             Current Facility-Administered Medications   Medication Dose Route Frequency    sodium hypochlorite (QUARTER STRENGTH DAKIN'S) 0.125% irrigation (bottle)   Topical BID    amLODIPine (NORVASC) tablet 5 mg  5 mg Oral DAILY    isosorbide mononitrate ER (IMDUR) tablet 60 mg  60 mg Oral DAILY    levothyroxine (SYNTHROID) tablet 88 mcg  88 mcg Oral ACB    lovastatin (MEVACOR) tablet 20 mg  20 mg Oral DAILY    oxybutynin (DITROPAN) tablet 5 mg  5 mg Oral DAILY    tamsulosin (FLOMAX) capsule 0.4 mg  0.4 mg Oral DAILY    0.9% sodium chloride infusion  75 mL/hr IntraVENous CONTINUOUS    clopidogrel (PLAVIX) tablet 75 mg  75 mg Oral DAILY    acetaminophen (TYLENOL) tablet 650 mg  650 mg Oral Q4H PRN    oxyCODONE-acetaminophen (PERCOCET) 5-325 mg per tablet 1 Tab  1 Tab Oral Q4H PRN    morphine injection 1 mg  1 mg IntraVENous Q1H PRN    ondansetron (ZOFRAN) injection 4 mg  4 mg IntraVENous Q6H PRN    diphenhydrAMINE (BENADRYL) injection 12.5 mg  12.5 mg IntraVENous Q4H PRN    piperacillin-tazobactam (ZOSYN) 3.375 g in 0.9% sodium chloride (MBP/ADV) 100 mL MBP  3.375 g IntraVENous Q6H       Allergies: Latex and Tape [adhesive]    Temp (24hrs), Av.8 °F (36.6 °C), Min:97.6 °F (36.4 °C), Max:97.9 °F (36.6 °C)    Visit Vitals    /53 (BP 1 Location: Left arm, BP Patient Position: At rest)    Pulse 65    Temp 97.9 °F (36.6 °C)    Resp 18    Ht 6' (1.829 m)    Wt 76.5 kg (168 lb 10.4 oz)    SpO2 98%    BMI 22.87 kg/m2       ROS: 12 point ROS obtained in details. Pertinent positives as mentioned in HPI,   otherwise negative    Physical Exam:    GENERAL: He is a thin, pleasant male who appears stated age, resting comfortably, in no acute distress. HEENT: Head is atraumatic, normocephalic. Sclerae anicteric. Oropharynx without lesions. He has moist mucous membranes. NECK: Supple. LYMPHATICS: No neck or axillary lymphadenopathy. CARDIOVASCULAR: Regular rate and rhythm. No murmurs. No jugular venous distention.  No peripheral edema. PULMONARY: Clear to auscultation bilaterally. GASTROINTESTINAL: Abdomen is soft, nontender, nondistended. SKIN: Warm, dry, without lesions except surgical changes left foot per report. The rest of his skin exam is unremarkable. NEUROLOGICAL: Nonfocal.  MUSCULOSKELETAL: resolved tenderness and erythema left leg, left foot dressing not opened  PSYCHIATRIC: He is alert, awake, oriented. He has appropriate affect.   Recent and remote memory appear intact    Labs: Results:   Chemistry Recent Labs      02/27/17   0245  02/26/17   0355  02/25/17   0320   GLU  99  95  92   NA  140  142  140   K  3.9  3.8  4.3   CL  105  109*  109*   CO2  28  26  28   BUN  16  15  16   CREA  1.14  1.27  1.18   CA  8.8  9.2  8.6   AGAP  7  7  3   BUCR  14  12  14      CBC w/Diff Recent Labs      02/26/17   0355   WBC  5.7   RBC  3.51*   HGB  10.0*   HCT  31.6*   PLT  318   GRANS  60   LYMPH  19*   EOS  5      Microbiology Recent Labs      02/24/17   1321  02/24/17   1320   CULT  NO GROWTH 3 DAYS  NO ANAEROBES ISOLATED 3 DAYS  NO GROWTH 3 DAYS  NO ANAEROBES ISOLATED 3 DAYS          RADIOLOGY:    All available imaging studies/reports in [facility] for this admission were reviewed    Dr. Felix Guzman, Infectious Disease Specialist  [phone]  February 27, 2017  4:25 PM

## 2017-02-27 NOTE — ROUTINE PROCESS
Bedside and Verbal shift change report given to Celi Ware (oncoming nurse) by Hilda Reed RN (offgoing nurse). Report included the following information SBAR, Kardex, MAR and Recent Results.     SITUATION:    Code Status: Full Code   Reason for Admission: Osteomyelitis ([facility] [address]  Yuval Gandhi 48 day: 6   Problem List:       Hospital Problems  Date Reviewed: 2/24/2017          Codes Class Noted POA    Osteomyelitis [facility]) ICD-10-CM: M86.9  ICD-9-CM: 730.20  2/20/2017 Unknown              BACKGROUND:    Past Medical History:   Past Medical History:   Diagnosis Date    Acute lower GI bleeding 4/9/2016    Anemia due to acute blood loss 4/9/2016    Attributed to lower GI bleed    Benign hypertension without congestive heart failure     Bleeding risk due to aspirin 4/9/2016    Aspirin + Clopidogrel    Cancer (HCC)     hx squamous cell    Carotid artery disease ([facility] [address] 3/8/2016    Chemotherapy convalescence or palliative care Nov. 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)    History of MRSA infection 12/28/2015    Culture of surgical wound from left great toe (12/28/2015): POSITIVE for MRSA    History of peptic ulcer disease     Hypercholesterolemia     Hypothyroidism     Long term current use of anticoagulant therapy     Non-ST elevation myocardial infarction (NSTEMI) ([address] 4/9/2016    Peripheral vascular disease ([facility] [address]     Radiation therapy complication Nov 9335 to Jan 2014    Rectal CA    Spinal stenosis of lumbar region     Vitamin D insufficiency 4/16/2016    Vitamin D 25-Hydroxy (4/16/2016) = 25.6         Patient taking anticoagulants no     ASSESSMENT:    Changes in Assessment Throughout Shift: no     Patient has Central Line: no Reasons if yes:    Patient has Brandt Cath: no Reasons if yes:       Last Vitals:     Vitals:    02/25/17 2024 02/26/17 0041 02/26/17 0854 02/26/17 1147   BP: 127/54 164/62 190/72 144/52   Pulse: 65 64 79 65   Resp: 18 18 16 16   Temp: 98.9 °F (37.2 °C) 97.3 °F (36.3 °C) 97.8 °F (36.6 °C) 98.1 °F (36.7 °C)   SpO2: 96% 96%  97%   Weight:       Height:            IV and DRAINS (will only show if present)   Peripheral IV 02/20/17 Right Antecubital-Site Assessment: Clean, dry, & intact     WOUND (if present)   Wound Type: Foot dressing   Dressing present Dressing Present : No   Wound Concerns/Notes:       PAIN    Pain Assessment    Pain Intensity 1: 0 (02/26/17 1311)    Pain Location 1: Foot    Pain Intervention(s) 1: Medication (see MAR)    Patient Stated Pain Goal: 0  o Interventions for Pain:    o Intervention effective:   o Time of last intervention:    o Reassessment Completed: yes      Last 3 Weights:  Last 3 Recorded Weights in this Encounter    02/20/17 1207 02/21/17 0618 02/22/17 0439   Weight: 77.1 kg (170 lb) 77.4 kg (170 lb 10.2 oz) 76.5 kg (168 lb 10.4 oz)     Weight change:      INTAKE/OUPUT    Current Shift:      Last three shifts: 02/25 0701 - 02/26 1900  In: 320 [P.O.:320]  Out: 1375 [Urine:1375]     LAB RESULTS     Recent Labs      02/26/17   0355   WBC  5.7   HGB  10.0*   HCT  31.6*   PLT  318        Recent Labs      02/26/17   0355  02/25/17   0320   NA  142  140   K  3.8  4.3   GLU  95  92   BUN  15  16   CREA  1.27  1.18   CA  9.2  8.6       RECOMMENDATIONS AND DISCHARGE PLANNING     1. Pending tests/procedures/ Plan of Care or Other Needs: wound care, antibiotics     2. Discharge plan for patient and Needs/Barriers: home with home health    3.  Estimated Discharge Date: 2/27/17 Posted on Whiteboard in Patients Room: yes      4. The patient's care plan was reviewed with the oncoming nurse. \"HEALS\" SAFETY CHECK      Fall Risk    Total Score: 2    Safety Measures: Safety Measures: Bed/Chair-Wheels locked    A safety check occurred in the patient's room between off going nurse and oncoming nurse listed above. The safety check included the below items  Area Items   H  High Alert Medications - Verify all high alert medication drips (heparin, PCA, etc.)   E  Equipment - Suction is set up for ALL patients (with herminia)  - Red plugs utilized for all equipment (IV pumps, etc.)  - WOWs wiped down at end of shift.  - Room stocked with oxygen, suction, and other unit-specific supplies   A  Alarms - Bed alarm is set for fall risk patients  - Ensure chair alarm is in place and activated if patient is up in a chair   L  Lines - Check IV for any infiltration  - Brandt bag is empty if patient has a Brandt   - Tubing and IV bags are labeled   S  Safety   - Room is clean, patient is clean, and equipment is clean. - Hallways are clear from equipment besides carts. - Fall bracelet on for fall risk patients  - Ensure room is clear and free of clutter  - Suction is set up for ALL patients (with herminia)  - Hallways are clear from equipment besides carts.    - Isolation precautions followed, supplies available outside room, sign posted     Kirby Mueller RN

## 2017-02-27 NOTE — ROUTINE PROCESS
Bedside and Verbal shift change report given to Gael Alfaro RN (oncoming nurse) by Ti Humphries RN (offgoing nurse). Report included the following information SBAR, Kardex, MAR and Recent Results.     SITUATION:    Code Status: Full Code  Reason for Admission: Osteomyelitis [facility])  Yuval Gandhi 48 day: 7   Problem List:       Hospital Problems  Date Reviewed: 2/24/2017          Codes Class Noted POA    Osteomyelitis [facility]) ICD-10-CM: M86.9  ICD-9-CM: 730.20  2/20/2017 Unknown              BACKGROUND:    Past Medical History:   Past Medical History:   Diagnosis Date    Acute lower GI bleeding 4/9/2016    Anemia due to acute blood loss 4/9/2016    Attributed to lower GI bleed    Benign hypertension without congestive heart failure     Bleeding risk due to aspirin 4/9/2016    Aspirin + Clopidogrel    Cancer (HCC)     hx squamous cell    Carotid artery disease ([facility] [address] 3/8/2016    Chemotherapy convalescence or palliative care Nov. 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)    History of MRSA infection 12/28/2015    Culture of surgical wound from left great toe (12/28/2015): POSITIVE for MRSA    History of peptic ulcer disease     Hypercholesterolemia     Hypothyroidism     Long term current use of anticoagulant therapy     Non-ST elevation myocardial infarction (NSTEMI) ([address] 4/9/2016    Peripheral vascular disease ([facility] [address]     Radiation therapy complication Nov 2513 to Jan 2014    Rectal CA    Spinal stenosis of lumbar region     Vitamin D insufficiency 4/16/2016    Vitamin D 25-Hydroxy (4/16/2016) = 25.6         Patient taking anticoagulants no     ASSESSMENT:    Changes in Assessment Throughout Shift: no     Patient has Central Line: no Reasons if yes:    Patient has Brandt Cath: no Reasons if yes:       Last Vitals:     Vitals:    02/26/17 0041 02/26/17 0854 02/26/17 1147 02/27/17 0410   BP: 164/62 190/72 144/52 164/66   Pulse: 64 79 65 (!) 58   Resp: 18 16 16 18   Temp: 97.3 °F (36.3 °C) 97.8 °F (36.6 °C) 98.1 °F (36.7 °C) 97.8 °F (36.6 °C)   SpO2: 96%  97% 98%   Weight:       Height:            IV and DRAINS (will only show if present)   Peripheral IV 02/20/17 Right Antecubital-Site Assessment: Clean, dry, & intact     WOUND (if present)   Wound Type: Foot dressing   Dressing present Dressing Present : No   Wound Concerns/Notes:       PAIN    Pain Assessment    Pain Intensity 1: 2 (02/26/17 2100)    Pain Location 1: Flank    Pain Intervention(s) 1: Medication (see MAR)    Patient Stated Pain Goal: 0  o Interventions for Pain:    o Intervention effective:   o Time of last intervention:    o Reassessment Completed: yes      Last 3 Weights:  Last 3 Recorded Weights in this Encounter    02/20/17 1207 02/21/17 0618 02/22/17 0439   Weight: 77.1 kg (170 lb) 77.4 kg (170 lb 10.2 oz) 76.5 kg (168 lb 10.4 oz)     Weight change:      INTAKE/OUPUT    Current Shift:      Last three shifts: 02/25 0701 - 02/26 1900  In: 320 [P.O.:320]  Out: 1375 [Urine:1375]     LAB RESULTS     Recent Labs      02/26/17   0355   WBC  5.7   HGB  10.0*   HCT  31.6*   PLT  318        Recent Labs      02/27/17   0245  02/26/17   0355  02/25/17   0320   NA  140  142  140   K  3.9  3.8  4.3   GLU  99  95  92   BUN  16  15  16   CREA  1.14  1.27  1.18   CA  8.8  9.2  8.6       RECOMMENDATIONS AND DISCHARGE PLANNING     1. Pending tests/procedures/ Plan of Care or Other Needs: wound care, antibiotics     2.  Discharge plan for patient and Needs/Barriers: home with home health    3. Estimated Discharge Date: 2/27/17 Posted on Whiteboard in \[facility] [place] [facility]\"": yes      4. The patient's care plan was reviewed with the oncoming nurse. \"HEALS\" SAFETY CHECK      Fall Risk    Total Score: 2    Safety Measures: Safety Measures: Bed/Chair alarm on, Bed/Chair-Wheels locked, Bed in low position, Call light within reach, Side rails X 3    A safety check occurred in the patient's room between off going nurse and oncoming nurse listed above. The safety check included the below items  Area Items   H  High Alert Medications - Verify all high alert medication drips (heparin, PCA, etc.)   E  Equipment - Suction is set up for ALL patients (with herminia)  - Red plugs utilized for all equipment (IV pumps, etc.)  - WOWs wiped down at end of shift.  - Room stocked with oxygen, suction, and other unit-specific supplies   A  Alarms - Bed alarm is set for fall risk patients  - Ensure chair alarm is in place and activated if patient is up in a chair   L  Lines - Check IV for any infiltration  - Brandt bag is empty if patient has a Brandt   - Tubing and IV bags are labeled   S  Safety   - Room is clean, patient is clean, and equipment is clean. - Hallways are clear from equipment besides carts. - Fall bracelet on for fall risk patients  - Ensure room is clear and free of clutter  - Suction is set up for ALL patients (with herminia)  - Hallways are clear from equipment besides carts.    - Isolation precautions followed, supplies available outside room, sign posted     Dania Givens RN

## 2017-02-27 NOTE — PROGRESS NOTES
[facility] provided for this pt who chose [facility] for Home Health follow-up at his discharge. Dr. Jayda Yeboah M.D.,I.D is recommending that this pt be discharge with I.V. Antibiotics, which will be costly at home. [facility] Pt will require a PICC line for this antibiotic.

## 2017-02-27 NOTE — ROUTINE PROCESS
ARU/IPR REFERRAL CONTACT NOTE  [address] [place] [address] for Physical Rehabilitation    RE: Geraldo Dempsey     Thank you for the opportunity to review this patient's case for admission to [address] [place] [address] for Physical Rehabilitation. Based on our pre-admission screening:     [x ] This patient does not meet criteria for admission to [facility] for Physical Rehabilitation due to:    [x ] Too functional, per documentation, patient does not require both Physical and Occupational Therapy Services at an acute rehabilitation level of intensity. [x ] We recommend the following:  [ ] Re-evaluation of this patient's status when appropriate  [x ] Home with Home Care Services  [ ] Outpatient Therapy Services  [x ] Skilled Nursing Facility/Sub Acute Services with extended stay option  [ ] Assisted Living/ Adult Home    Again, Thank you for this referral. Should you have any questions please do not hesitate to call. Sincerely,  Isrrael Chatterjee. Tonia Cooks, [id number]   Tonia Cooks, RN  Admissions [facility] for Physical Rehabilitation  [phone]

## 2017-02-27 NOTE — PROGRESS NOTES
NUTRITION    Nutrition Screen      RECOMMENDATIONS / PLAN:     - No nutrition intervention indicated at this time. Will re-screen as appropriate. NUTRITION INTERVENTIONS & DIAGNOSIS:     Nutrition Diagnosis: No nutrition diagnosis at this time. ASSESSMENT:     Subjective/Objective:  Consuming 100% of meals. Noted plan for discharge. Current Appetite:   [x] Good     [] Fair     [] Poor     [] Other:  Appetite/meal intake prior to admission:   [] Good     [] Fair     [] Poor     [] Other:    Diet:     DIET REGULAR    Food Allergies: NKFA  Feeding Limitations:  [] Swallowing difficulty    [] Chewing difficulty    [] Other:  Current Meal Intake: Patient Vitals for the past 100 hrs:   % Diet Eaten   02/26/17 0406 900 %   02/25/17 1300 100 %   02/23/17 1735 100 %     Average po intake adequate to meet patients estimated nutritional needs:   [x] Yes     [] No   [] Unable to determine at this time    BM: 2/27   Skin Integrity: foot surgical incision   Edema: none   Pertinent Medications: Reviewed    Recent Labs      02/27/17   0245  02/26/17   0355  02/25/17   0320   NA  140  142  140   K  3.9  3.8  4.3   CL  105  109*  109*   CO2  28  26  28   GLU  99  95  92   BUN  16  15  16   CREA  1.14  1.27  1.18   CA  8.8  9.2  8.6       Intake/Output Summary (Last 24 hours) at 02/27/17 1635  Last data filed at 02/27/17 1054   Gross per 24 hour   Intake                0 ml   Output                1 ml   Net               -1 ml       Anthropometrics:  Ht Readings from Last 1 Encounters:   02/20/17 6' (1.829 m)     Last 3 Recorded Weights in this Encounter    02/20/17 1207 02/21/17 0618 02/22/17 0439   Weight: 77.1 kg (170 lb) 77.4 kg (170 lb 10.2 oz) 76.5 kg (168 lb 10.4 oz)     Body mass index is 22.87 kg/(m^2).           Weight History:   Weight Metrics 2/22/2017 2/16/2017 2/6/2017 2/2/2017 12/21/2016 12/1/2016 10/25/2016   Weight 168 lb 10.4 oz 171 lb 171 lb 171 lb 165 lb 12.8 oz 165 lb 165 lb   BMI 22.87 kg/m2 23.19 kg/m2 23.19 kg/m2 23.19 kg/m2 22.49 kg/m2 22.38 kg/m2 22.38 kg/m2        Admitting Diagnosis: Osteomyelitis (HCC)  DX  Past Medical History:   Diagnosis Date    Acute lower GI bleeding 4/9/2016    Anemia due to acute blood loss 4/9/2016    Attributed to lower GI bleed    Benign hypertension without congestive heart failure     Bleeding risk due to aspirin 4/9/2016    Aspirin + Clopidogrel    Cancer (HCC)     hx squamous cell    [facility] 3/8/2016    Chemotherapy convalescence or palliative care Nov. 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)    History of MRSA infection 12/28/2015    Culture of surgical wound from left great toe (12/28/2015): POSITIVE for MRSA    History of peptic ulcer disease     Hypercholesterolemia     Hypothyroidism     Long term current use of anticoagulant therapy     Non-ST elevation myocardial infarction (NSTEMI) ([facility] 4/9/2016    Peripheral vascular disease ([facility]     Radiation therapy complication Nov 7597 to Jan 2014    Rectal CA    Spinal stenosis of lumbar region     Vitamin D insufficiency 4/16/2016    Vitamin D 25-Hydroxy (4/16/2016) = 25.6       Education Needs:        [x] None identified  [] Identified - Not appropriate at this time  []  Identified and addressed - refer to education log  Learning Limitations:   [x] None identified  [] Identified    Cultural, Methodist & ethnic food preferences:  [x] None identified    [] Identified and addressed     ESTIMATED NUTRITION NEEDS:     Calories: 5728-4329 kcal (MSJx1.2-1.3) based on  [x] Actual BW: 77 kg     [] IBW:   Protein: 62-77 gm (0.8-1 gm/kg) based on  [x] Actual BW: 77 kg     [] IBW:   Fluid: 1 mL/kcal     MONITORING & EVALUATION:     Nutrition Goal(s):   1. Po intake of meals will meet >75% of patient estimated nutritional needs within the next 7 days.   Outcome:  [] Met/Ongoing    []  Not Met    [x] New/Initial Goal     Monitoring:   [x] Diet tolerance   [x] Meal intake   [] Supplement intake   [] GI symptoms/ability to tolerate po diet   [] Respiratory status   [] Plan of care      Previous Recommendations (for follow-up assessments only):     []   Implemented       []   Not Implemented (RD to address)     [] No Recommendation Made     Discharge Planning: cardiac diet   [x] Participated in care planning, discharge planning, & interdisciplinary rounds as appropriate      Anselmo Gill, [address]    Pager: [phone]

## 2017-02-27 NOTE — PROGRESS NOTES
Pt states Infectious disease doctor going be coming in to change dressing before he can be discharged. Pt refused iv zosyn, states causing diarrhea.

## 2017-02-28 ENCOUNTER — APPOINTMENT (OUTPATIENT)
Dept: INTERVENTIONAL RADIOLOGY/VASCULAR | Age: 82
DRG: 253 | End: 2017-02-28
Attending: INTERNAL MEDICINE
Payer: MEDICARE

## 2017-02-28 VITALS
HEART RATE: 63 BPM | WEIGHT: 168.65 LBS | HEIGHT: 72 IN | TEMPERATURE: 98.1 F | OXYGEN SATURATION: 98 % | DIASTOLIC BLOOD PRESSURE: 65 MMHG | RESPIRATION RATE: 20 BRPM | BODY MASS INDEX: 22.84 KG/M2 | SYSTOLIC BLOOD PRESSURE: 145 MMHG

## 2017-02-28 LAB
ANION GAP BLD CALC-SCNC: 6 MMOL/L (ref 3–18)
BUN SERPL-MCNC: 15 MG/DL (ref 7–18)
BUN/CREAT SERPL: 15 (ref 12–20)
CALCIUM SERPL-MCNC: 9 MG/DL (ref 8.5–10.1)
CHLORIDE SERPL-SCNC: 107 MMOL/L (ref 100–108)
CO2 SERPL-SCNC: 29 MMOL/L (ref 21–32)
CREAT SERPL-MCNC: 1.03 MG/DL (ref 0.6–1.3)
GLUCOSE SERPL-MCNC: 90 MG/DL (ref 74–99)
POTASSIUM SERPL-SCNC: 3.9 MMOL/L (ref 3.5–5.5)
SODIUM SERPL-SCNC: 142 MMOL/L (ref 136–145)

## 2017-02-28 PROCEDURE — 74011250636 HC RX REV CODE- 250/636: Performed by: INTERNAL MEDICINE

## 2017-02-28 PROCEDURE — 74011000258 HC RX REV CODE- 258: Performed by: INTERNAL MEDICINE

## 2017-02-28 PROCEDURE — 74011250637 HC RX REV CODE- 250/637: Performed by: INTERNAL MEDICINE

## 2017-02-28 PROCEDURE — 80048 BASIC METABOLIC PNL TOTAL CA: CPT | Performed by: INTERNAL MEDICINE

## 2017-02-28 PROCEDURE — 77001 FLUOROGUIDE FOR VEIN DEVICE: CPT

## 2017-02-28 PROCEDURE — 02HV33Z INSERTION OF INFUSION DEVICE INTO SUPERIOR VENA CAVA, PERCUTANEOUS APPROACH: ICD-10-PCS | Performed by: RADIOLOGY

## 2017-02-28 PROCEDURE — 36415 COLL VENOUS BLD VENIPUNCTURE: CPT | Performed by: INTERNAL MEDICINE

## 2017-02-28 PROCEDURE — 74011250637 HC RX REV CODE- 250/637: Performed by: SURGERY

## 2017-02-28 RX ADMIN — OXYBUTYNIN CHLORIDE 5 MG: 5 TABLET ORAL at 10:27

## 2017-02-28 RX ADMIN — AMLODIPINE BESYLATE 5 MG: 5 TABLET ORAL at 10:27

## 2017-02-28 RX ADMIN — SODIUM CHLORIDE 75 ML/HR: 900 INJECTION, SOLUTION INTRAVENOUS at 03:06

## 2017-02-28 RX ADMIN — PIPERACILLIN AND TAZOBACTAM 3.38 G: 3; .375 INJECTION, POWDER, LYOPHILIZED, FOR SOLUTION INTRAVENOUS; PARENTERAL at 05:39

## 2017-02-28 RX ADMIN — TAMSULOSIN HYDROCHLORIDE 0.4 MG: 0.4 CAPSULE ORAL at 10:27

## 2017-02-28 RX ADMIN — LEVOTHYROXINE SODIUM 88 MCG: 88 TABLET ORAL at 10:28

## 2017-02-28 RX ADMIN — CLOPIDOGREL BISULFATE 75 MG: 75 TABLET ORAL at 10:27

## 2017-02-28 RX ADMIN — LOVASTATIN 20 MG: 20 TABLET ORAL at 10:28

## 2017-02-28 RX ADMIN — ISOSORBIDE MONONITRATE 60 MG: 60 TABLET, EXTENDED RELEASE ORAL at 10:28

## 2017-02-28 RX ADMIN — OXYCODONE HYDROCHLORIDE AND ACETAMINOPHEN 1 TABLET: 5; 325 TABLET ORAL at 10:00

## 2017-02-28 NOTE — INTERDISCIPLINARY ROUNDS
IDR/SLIDR Summary          Patient: Syeda Bals MRN: [id number]    Age: 80 y.o. YOB: 1935 Room/Bed: [facility]   Admit Diagnosis: Osteomyelitis (HCC)  DX  Principal Diagnosis: <principal problem not specified>   Goals: safety,pain control  Readmission: NO  Quality Measure: Not applicable  VTE Prophylaxis: Not needed  Influenza Vaccine screening completed? YES  Pneumococcal Vaccine screening completed? YES  Mobility needs: Yes   Nutrition plan:Yes  Consults: P. T and Case Management    Financial concerns:Yes  Escalated to CM?  YES  RRAT Score:26   Interventions:Home Health  Testing due for pt today?no  LOS: 8 days Expected length of stay 8 days  Discharge plan: home   PCP: Shane Cortez MD  Transportation needs: No    Days before discharge:ready for discharge  Discharge disposition: Home    Signed:     Sadaf Brand RN  2/28/2017  7:21 AM

## 2017-02-28 NOTE — ROUTINE PROCESS
Bedside and Verbal shift change report given to Tonny Murphy RN RN (oncoming nurse) by Nimco Lu (offgoing nurse). Report included the following information SBAR, Kardex, MAR and Recent Results.     SITUATION:    Code Status: Full Code  Reason for Admission: Osteomyelitis [facility])  Yuval Gandhi 48 day: 8   Problem List:       Hospital Problems  Date Reviewed: 2/24/2017          Codes Class Noted POA    Osteomyelitis [facility]) ICD-10-CM: M86.9  ICD-9-CM: 730.20  2/20/2017 Unknown              BACKGROUND:    Past Medical History:   Past Medical History:   Diagnosis Date    Acute lower GI bleeding 4/9/2016    Anemia due to acute blood loss 4/9/2016    Attributed to lower GI bleed    Benign hypertension without congestive heart failure     Bleeding risk due to aspirin 4/9/2016    Aspirin + Clopidogrel    Cancer (HCC)     hx squamous cell    Carotid artery disease ([facility] 3/8/2016    Chemotherapy convalescence or palliative care Nov. 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)    History of MRSA infection 12/28/2015    Culture of surgical wound from left great toe (12/28/2015): POSITIVE for MRSA    History of peptic ulcer disease     Hypercholesterolemia     Hypothyroidism     Long term current use of anticoagulant therapy     Non-ST elevation myocardial infarction (NSTEMI) ([address] 4/9/2016    Peripheral vascular disease ([facility]     Radiation therapy complication Nov 8562 to Jan 2014    Rectal CA    Spinal stenosis of lumbar region     Vitamin D insufficiency 4/16/2016    Vitamin D 25-Hydroxy (4/16/2016) = 25.6         Patient taking anticoagulants no     ASSESSMENT:    Changes in Assessment Throughout Shift: no     Patient has Central Line: no Reasons if yes: n/a   Patient has Brandt Cath: no Reasons if yes: n/a      Last Vitals:     Vitals:    02/27/17 1553 02/27/17 2019 02/28/17 0000 02/28/17 0400   BP: 159/65 114/64 162/63 155/67   Pulse: 70 63 (!) 52 (!) 50   Resp: 17 16 17 17   Temp: 98.6 °F (37 °C) 97.7 °F (36.5 °C) 97.4 °F (36.3 °C) 97.8 °F (36.6 °C)   SpO2: 98% 98% 98% 98%   Weight:       Height:            IV and DRAINS (will only show if present)   Peripheral IV 02/20/17 Right Antecubital-Site Assessment: Clean, dry, & intact     WOUND (if present)   Wound Type:  none   Dressing present Dressing Present : No   Wound Concerns/Notes:  none     PAIN    Pain Assessment    Pain Intensity 1: 0 (02/28/17 0423)    Pain Location 1: Ankle, Foot    Pain Intervention(s) 1: Medication (see MAR)    Patient Stated Pain Goal: 0  o Interventions for Pain:yes   o Intervention effective: yes  o Time of last intervention: 2109  o Reassessment Completed: yes     Last 3 Weights:  Last 3 Recorded Weights in this Encounter    02/20/17 1207 02/21/17 0618 02/22/17 0439   Weight: 77.1 kg (170 lb) 77.4 kg (170 lb 10.2 oz) 76.5 kg (168 lb 10.4 oz)     Weight change:      INTAKE/OUPUT    Current Shift:      Last three shifts: 02/26 0701 - 02/27 1900  In: -   Out: 1      LAB RESULTS     Recent Labs      02/26/17   0355   WBC  5.7   HGB  10.0*   HCT  31.6*   PLT  318        Recent Labs      02/28/17   0343  02/27/17   0245  02/26/17   0355   NA  142  140  142   K  3.9  3.9  3.8   GLU  90  99  95   BUN  15  16  15   CREA  1.03  1.14  1.27   CA  9.0  8.8  9.2       RECOMMENDATIONS AND DISCHARGE PLANNING     1. Pending tests/procedures/ Plan of Care or Other Needs: For picc line placement    2. Discharge plan for patient and Needs/Barriers: Home today    3.  Estimated Discharge Date: 02/28/17 Posted on Whiteboard in [facility]: yes      4. The patient's care plan was reviewed with the oncoming nurse. \"HEALS\" SAFETY CHECK      Fall Risk    Total Score: 2    Safety Measures: Safety Measures: Bed/Chair alarm on, Bed/Chair-Wheels locked, Bed in low position, Call light within reach, Fall prevention (comment)    A safety check occurred in the patient's room between off going nurse and oncoming nurse listed above. The safety check included the below items  Area Items   H  High Alert Medications - Verify all high alert medication drips (heparin, PCA, etc.)   E  Equipment - Suction is set up for ALL patients (with herminia)  - Red plugs utilized for all equipment (IV pumps, etc.)  - WOWs wiped down at end of shift.  - Room stocked with oxygen, suction, and other unit-specific supplies   A  Alarms - Bed alarm is set for fall risk patients  - Ensure chair alarm is in place and activated if patient is up in a chair   L  Lines - Check IV for any infiltration  - Brandt bag is empty if patient has a Brandt   - Tubing and IV bags are labeled   S  Safety   - Room is clean, patient is clean, and equipment is clean. - Hallways are clear from equipment besides carts. - Fall bracelet on for fall risk patients  - Ensure room is clear and free of clutter  - Suction is set up for ALL patients (with herminia)  - Hallways are clear from equipment besides carts.    - Isolation precautions followed, supplies available outside room, sign posted     Kayleen Morales

## 2017-02-28 NOTE — DISCHARGE INSTRUCTIONS
Osteomyelitis: Care Instructions  Your Care Instructions  Osteomyelitis (say \"tb-gnti-sm-qq-uo-HL-tus\") is a bone infection. It is caused by bacteria. The bacteria can infect the bone where it has been injured, or they can be carried through the blood from another area in the body. Osteomyelitis can be a short- or long-term problem. It is treated with antibiotics. You may get the antibiotics as pills or through a needle in a vein (IV). You will probably get treatment in the hospital at first. The type of treatment depends on the type of bacteria causing the infection, the bones affected, and how bad the infection is. Sometimes people need surgery to drain pus from bone or to fix damaged bone. Short-term osteomyelitis that is treated right away usually can be cured. But the long-term form sometimes comes back after treatment. You can help your chances of stopping the infection by taking your medicines as directed. Follow-up care is a key part of your treatment and safety. Be sure to make and go to all appointments, and call your doctor if you are having problems. It's also a good idea to know your test results and keep a list of the medicines you take. How can you care for yourself at home? · Take your antibiotics as directed. Do not stop taking them just because you feel better. You need to take the full course of antibiotics. · Take pain medicines exactly as directed. ¨ If the doctor gave you a prescription medicine for pain, take it as prescribed. ¨ If you are not taking a prescription pain medicine, ask your doctor if you can take an over-the-counter medicine. · Do mild exercise and stretching if your doctor says it is okay. This can help keep your bones and muscles healthy. Avoid strenuous work or exercise until your doctor says you can do it. · Consider physical therapy if your doctor suggests it. Physical therapy may help you have a normal range of movement. · Do not smoke.  Smoking can slow healing of the infection. If you need help quitting, talk to your doctor about stop-smoking programs and medicines. These can increase your chances of quitting for good. When should you call for help? Call 911 anytime you think you may need emergency care. For example, call if:  · You have severe bone pain. Call your doctor now or seek immediate medical care if:  · You continue to have bone pain. · You have signs of infection, such as:  ¨ Increased pain, swelling, warmth, or redness. ¨ Red streaks leading from a wound. ¨ Pus draining from a wound. ¨ A fever. Watch closely for changes in your health, and be sure to contact your doctor if:  · You do not get better as expected. Where can you learn more? Go to http://robert-leno.info/. Enter G264 in the search box to learn more about \"Osteomyelitis: Care Instructions. \"  Current as of: June 4, 2016  Content Version: 11.1  © 2866-9775 [employer]. Care instructions adapted under license by [employer] (which disclaims liability or warranty for this information). If you have questions about a medical condition or this instruction, always ask your healthcare professional. [address] any warranty or liability for your use of this information.

## 2017-02-28 NOTE — PROCEDURES
INTERVENTIONAL RADIOLOGY POST PICC LINE NOTE     February 28, 2017       11:25 AM     Preoperative Diagnosis:   IV Access for     Postoperative Diagnosis:  Same. : Mihai Worthington PA-C    Assistant:  None. Attending Physician: Dr Emely Shearer    Type of Anesthesia:  1% Lidocaine locally    Procedure/Description: Right upper extremity PICC Line. Findings: Successful placement of a 6 Fr dual lumen power injectable PICC line in the brachial vein. Tip at SVC/RA junction. OK to use. Length 43 cm. Estimated blood Loss: Minimal    Specimen Removed: None    Drains: None     Complications:  None. Condition:  Stable.     Discharge Plan: Return to nursing unit    Alejandra Cannon PA-C

## 2017-02-28 NOTE — PROGRESS NOTES
Called Dr. Britney Valenzuela  to make aware picc line was not done today due to pt having diarrhea. To be done in am. Discharge order cancelled for today. He said Dr. Michael Montemayor had already told him.

## 2017-02-28 NOTE — PROGRESS NOTES
Pt has agreed to accept his I.V. Antibiotics with [facility], and has agreed to the cost of the co-pay that is being arranged with this agency.

## 2017-03-01 ENCOUNTER — PATIENT OUTREACH (OUTPATIENT)
Dept: INTERNAL MEDICINE CLINIC | Age: 82
End: 2017-03-01

## 2017-03-01 LAB
BACTERIA SPEC CULT: NORMAL
GRAM STN SPEC: NORMAL
GRAM STN SPEC: NORMAL
SERVICE CMNT-IMP: NORMAL

## 2017-03-01 NOTE — PROGRESS NOTES
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 The patient has been discharged to home with a PICC line and will be receiving IV antibiotics at home. [facility] home [facility] will provide service in the home. Reached patient and identified self/role. Mr. Jolie Mcrae stated \"I'm doing Pearlene Foil. My foot hurts a little where they cut off my toe. I take a pain pill when I need it mostly at night to help me sleep. \"  Patient is wearing surgical shoe as instructed and using walker for ambulation. Lives with wife who assists as needed. [facility] nurse was in the home to administer IV AB at the time of the call. Reconciled mediations and patient is taking as directed. Mr. Jolie Mcrae has an appointment with Dr. Balta Montelongo next week (pt unsure of date) for surgical follow up. Mr. Jolie Mcrae voiced understanding to call PCP for fever, chills, increased pain in foot or leg, wound drainage, odor, warmth. Will follow.

## 2017-03-10 ENCOUNTER — PATIENT OUTREACH (OUTPATIENT)
Dept: INTERNAL MEDICINE CLINIC | Age: 82
End: 2017-03-10

## 2017-03-10 NOTE — PROGRESS NOTES
Transitions of Care Coordination    Pura Cabral was hospitalized at [facility] 2/20-2/28/17 for osteomyelitis and amputation of left 2nd toe. Reached patient and identified self/role. Mr. Lucy Delgado stated he is feeling better but it has been hard to leave home to go to appointments. Patient is receiving home care services from [facility] and continues to receive daily IV antiibiotics through his pick line. The home care nurse is performing dressing changes to the left foot. Patient has an appointment with Dr. Apple Simon, foot surgeon, on 3/15/17 and stated he will call to schedule an appointment with Dr. Laurence Mauricio. Patient stated he is eating/drinking well and taking medications as directed. Voiced no questions or concerns. Will follow.

## 2017-03-20 ENCOUNTER — PATIENT OUTREACH (OUTPATIENT)
Dept: INTERNAL MEDICINE CLINIC | Age: 82
End: 2017-03-20

## 2017-03-20 NOTE — PROGRESS NOTES
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 The patient has been discharged to home with a PICC line and is still receiving IV antibiotics at home. [facility]  provides service in the home. Reached patient and identified self/role. Mr. Franco Charles stated \"I'm feeling pretty good. The nurse just left and changed my PICC line dressing. \"  The patient attended an appointment with Dr. Franoc Albrecht on 3/15/17 and was told his wound is healing well. Taking medications as directed. Voiced no questions or concerns. Will follow.

## 2017-03-29 ENCOUNTER — PATIENT OUTREACH (OUTPATIENT)
Dept: INTERNAL MEDICINE CLINIC | Age: 82
End: 2017-03-29

## 2017-03-29 NOTE — PROGRESS NOTES
Transitions of Care Coordination    Follow up for hospitalization 2/20-2/28/17 for osteomyelitis and right 2nd toe amputation. Reached patient and identified self/role. Mr. Yolande Edmond stated \"I'm doing well. I saw Dr. Khadijah Sherman yesterday and he took the stitches out of my foot and said it looks good. \"  Patient stated [facility] home [facility] continues to provide service in the home. Patient is still receiving IV antibiotics through his PICC line and this is scheduled to continue thru 4/7/17. Patient intends to ask if he can start receiving physical therapy at home. Voiced no questions or concerns. Will follow.

## 2017-04-03 ENCOUNTER — TELEPHONE (OUTPATIENT)
Dept: INTERNAL MEDICINE CLINIC | Age: 82
End: 2017-04-03

## 2017-04-03 NOTE — TELEPHONE ENCOUNTER
Home health nurse calling for Physical Therapy Evaluation. Patient is feeling better and feels like he is ready for Therapy. Please call Rock Renee at [phone] with verbal order.

## 2017-04-04 NOTE — TELEPHONE ENCOUNTER
Called and spoke with Kayla Gunderson and gave verbal ok to start PT. Understanding was voiced and she will put in order.

## 2017-04-08 ENCOUNTER — PATIENT OUTREACH (OUTPATIENT)
Dept: INTERNAL MEDICINE CLINIC | Age: 82
End: 2017-04-08

## 2017-04-25 ENCOUNTER — OFFICE VISIT (OUTPATIENT)
Dept: UROLOGY | Age: 82
End: 2017-04-25

## 2017-04-25 ENCOUNTER — HOSPITAL ENCOUNTER (OUTPATIENT)
Dept: LAB | Age: 82
Discharge: HOME | End: 2017-04-25
Payer: MEDICARE

## 2017-04-25 VITALS
TEMPERATURE: 96.8 F | WEIGHT: 171 LBS | OXYGEN SATURATION: 96 % | SYSTOLIC BLOOD PRESSURE: 157 MMHG | BODY MASS INDEX: 23.16 KG/M2 | HEART RATE: 94 BPM | HEIGHT: 72 IN | DIASTOLIC BLOOD PRESSURE: 66 MMHG

## 2017-04-25 DIAGNOSIS — R33.8 BPH (BENIGN PROSTATIC HYPERTROPHY) WITH URINARY RETENTION: Primary | ICD-10-CM

## 2017-04-25 DIAGNOSIS — N40.1 BPH (BENIGN PROSTATIC HYPERTROPHY) WITH URINARY RETENTION: ICD-10-CM

## 2017-04-25 DIAGNOSIS — N40.1 BPH (BENIGN PROSTATIC HYPERTROPHY) WITH URINARY RETENTION: Primary | ICD-10-CM

## 2017-04-25 DIAGNOSIS — R33.8 BPH (BENIGN PROSTATIC HYPERTROPHY) WITH URINARY RETENTION: ICD-10-CM

## 2017-04-25 LAB
BILIRUB UR QL STRIP: NEGATIVE
GLUCOSE UR-MCNC: NEGATIVE MG/DL
KETONES P FAST UR STRIP-MCNC: NEGATIVE MG/DL
PH UR STRIP: 5 [PH] (ref 4.6–8)
PROT UR QL STRIP: NEGATIVE MG/DL
PSA SERPL-MCNC: 0.3 NG/ML (ref 0–4)
SP GR UR STRIP: 1.01 (ref 1–1.03)
UA UROBILINOGEN AMB POC: NORMAL (ref 0.2–1)
URINALYSIS CLARITY POC: CLEAR
URINALYSIS COLOR POC: YELLOW
URINE BLOOD POC: NEGATIVE
URINE LEUKOCYTES POC: NEGATIVE
URINE NITRITES POC: NEGATIVE

## 2017-04-25 PROCEDURE — 84153 ASSAY OF PSA TOTAL: CPT | Performed by: UROLOGY

## 2017-04-25 NOTE — PROGRESS NOTES
Mr. Cy Arreguin has a reminder for a \"due or due soon\" health maintenance. I have asked that he contact his primary care provider for follow-up on this health maintenance.

## 2017-04-25 NOTE — PATIENT INSTRUCTIONS
Prostate Cancer: Care Instructions  Your Care Instructions    The prostate gland is a small, walnut-shaped organ that lies just below a man's bladder. It surrounds the urethra, the tube that carries urine from the bladder out of the body through the penis. Prostate cancer is the abnormal growth of cells in the prostate gland. Prostate cancer cells can spread within the prostate, to nearby lymph nodes and other tissues, and to other parts of the body. When the cancer hasn't spread outside the prostate, it is called localized prostate cancer. With localized prostate cancer, your options depend on how likely it is that your cancer will grow. Tests will show if your cancer is likely to grow. · Low-risk cancer isn't likely to grow right away. If your cancer is low-risk, you can choose active surveillance. This means your cancer will be watched closely by your doctor with regular checkups and tests to see if the cancer grows. This choice allows you to delay having surgery or radiation, often for many years. If the cancer grows very slowly, you may never need treatment. · Medium-risk cancer is more likely to grow. Some men with this type of cancer may be able to choose active surveillance. Others may need to choose surgery or radiation. · High-risk cancer is most likely to grow. If you have high-risk cancer, you will likely need to choose surgery or radiation. If your cancer has already spread outside the prostate or to other parts of the body, then you may have other treatments, like chemotherapy or hormone therapy. If you are over age [de-identified] or have other serious health problems, like heart disease, you may choose not to have treatments to cure your cancer. Instead, you can just have treatments to manage your symptoms. This is called watchful waiting. Finding out that you have cancer is scary. You may feel many emotions and may need some help coping. Seek out family, friends, and counselors for support.  You also can do things at home to make yourself feel better while you go through treatment. Call the [employer] (4-165.116.5358) or visit its website at 6097 uTaP. [employer] for more information. Follow-up care is a key part of your treatment and safety. Be sure to make and go to all appointments, and call your doctor if you are having problems. It's also a good idea to know your test results and keep a list of the medicines you take. How can you care for yourself at home? · Your doctor will talk to you about your treatment options. You may need to learn more about each of them before you can decide which treatment is best for you. · Take your medicines exactly as prescribed. Call your doctor if you think you are having a problem with your medicine. You will get more details on the specific medicines your doctor prescribes. · Eat healthy food. If you do not feel like eating, try to eat food that has protein and extra calories to keep up your strength and prevent weight loss. Drink liquid meal replacements for extra calories and protein. Try to eat your main meal early. · Take steps to control your stress and workload. Learn relaxation techniques. ¨ Share your feelings. Stress and tension affect our emotions. By expressing your feelings to others, you may be able to understand and cope with them. ¨ Consider joining a support group. Talking about a problem with your spouse, a good friend, or other people with similar problems is a good way to reduce tension and stress. ¨ Express yourself through art. Try writing, crafts, dance, or art to relieve stress. Some dance, writing, or art groups may be available just for people who have cancer. ¨ Be kind to your body and mind. Getting enough sleep, eating a healthy diet, and taking time to do things you enjoy can contribute to an overall feeling of balance in your life and can help reduce stress. ¨ Get help if you need it.  Discuss your concerns with your doctor or counselor. · Get some physical activity every day, but do not get too tired. Keep doing the hobbies you enjoy as your energy allows. · If you are vomiting or have diarrhea:  ¨ Drink plenty of fluids (enough so that your urine is light yellow or clear like water) to prevent dehydration. Choose water and other caffeine-free clear liquids. If you have kidney, heart, or liver disease and have to limit fluids, talk with your doctor before you increase the amount of fluids you drink. ¨ When you are able to eat, try clear soups, mild foods, and liquids until all symptoms are gone for 12 to 48 hours. Jell-O, dry toast, crackers, and cooked cereal are also good choices. · If you have not already done so, prepare a list of advance directives. Advance directives are instructions to your doctor and family members about what kind of care you want if you become unable to speak or express yourself. When should you call for help? Call your doctor now or seek immediate medical care if:  · You cannot urinate. · You have symptoms of a urinary infection. For example:  ¨ You have blood or pus in your urine. ¨ You have pain in your back just below your rib cage. This is called flank pain. ¨ You have a fever, chills, or body aches. ¨ It hurts to urinate. ¨ You have groin or belly pain. · You have pain in your back or hips. · Your pain is not controlled. · You are vomiting or nauseated. Watch closely for changes in your health, and be sure to contact your doctor if:  · You have pain when you ejaculate. · You have trouble starting or controlling your urine. Where can you learn more? Go to http://robert-leno.info/. Enter V141 in the search box to learn more about \"Prostate Cancer: Care Instructions. \"  Current as of: July 26, 2016  Content Version: 11.2  © 1571-8221 [employer].  Care instructions adapted under license by [employer] (which disclaims liability or warranty for this information). If you have questions about a medical condition or this instruction, always ask your healthcare professional. [address] any warranty or liability for your use of this information. Benign Prostatic Hyperplasia: Care Instructions  Your Care Instructions    Benign prostatic hyperplasia, or BPH, is an enlarged prostate gland. The prostate is a small gland that makes some of the fluid in semen. Prostate enlargement happens to almost all men as they age. It is usually not serious. BPH does not cause prostate cancer. As the prostate gets bigger, it may partly block the flow of urine. You may have a hard time getting a urine stream started or completely stopped. BPH can cause dribbling. You may have a weak urine stream, or you may have to urinate more often than you used to, especially at night. Most men find these problems easy to manage. You do not need treatment unless your symptoms bother you a lot or you have other problems, such as bladder infections or stones. In these cases, medicines may help. Surgery is not needed unless the urine flow is blocked or the symptoms do not get better with medicine. Follow-up care is a key part of your treatment and safety. Be sure to make and go to all appointments, and call your doctor if you are having problems. It's also a good idea to know your test results and keep a list of the medicines you take. How can you care for yourself at home? · Take plenty of time to urinate. Try to relax. · Try \"double voiding. \" Urinate as much you can, relax for a few moments, and then try to urinate again. · Sit on the toilet to urinate. · Read or think of other things while you are waiting. · Turn on a faucet, or try to picture running water. Some men find that this helps get their urine flowing. · If dribbling is a problem, wash your penis daily to avoid skin irritation and infection. · Avoid caffeine and alcohol.  These drinks will increase how often you need to urinate. Spread your fluid intake throughout the day. If the urge to urinate often wakes you at night, limit your fluid intake in the evening. Urinate right before you go to bed. · Many over-the-counter cold and allergy medicines can make the symptoms of BPH worse. Avoid antihistamines, decongestants, and allergy pills, if you can. Read the warnings on the package. · If you take any prescription medicines, especially tranquilizers or antidepressants, ask your doctor or pharmacist whether they can cause urination problems. There may be other medicines you can use that do not cause urinary problems. · Be safe with medicines. Take your medicines exactly as prescribed. Call your doctor if you think you are having a problem with your medicine. When should you call for help? Call your doctor now or seek immediate medical care if:  · You cannot urinate at all. · You have symptoms of a urinary infection. For example:  ¨ You have blood or pus in your urine. ¨ You have pain in your back just below your rib cage. This is called flank pain. ¨ You have a fever, chills, or body aches. ¨ It hurts to urinate. ¨ You have groin or belly pain. Watch closely for changes in your health, and be sure to contact your doctor if:  · It hurts when you ejaculate. · Your urinary problems get a lot worse or bother you a lot. Where can you learn more? Go to http://robert-leno.info/. Enter P530 in the search box to learn more about \"Benign Prostatic Hyperplasia: Care Instructions. \"  Current as of: May 24, 2016  Content Version: 11.2  © 6177-5912 [employer]. Care instructions adapted under license by [employer] (which disclaims liability or warranty for this information).  If you have questions about a medical condition or this instruction, always ask your healthcare professional. [place] [address] any warranty or liability for your use of this information.

## 2017-04-25 NOTE — MR AVS SNAPSHOT
Visit Information Date & Time Provider Department Dept. Phone Encounter #  
 4/25/2017 10:45 AM Meg Carty, [facility] [phone] [id number] Your Appointments 4/28/2017 10:00 AM  
Follow Up with Kim Singh NP  
[facility] [place] ([facility]) Appt Note: 3 mos [address], [address]  
  
   
 [address], [address] [place] [address] Upcoming Health Maintenance Date Due DTaP/Tdap/Td series (1 - Tdap) 10/15/1956 ZOSTER VACCINE AGE 60> 10/15/1997 GLAUCOMA SCREENING Q2Y 10/15/2002 Pneumococcal 65+ Low/Medium Risk (2 of 2 - PCV13) 3/29/2017 MEDICARE YEARLY EXAM 10/19/2017 Allergies as of 4/25/2017  Review Complete On: 4/25/2017 By: Manuel Corbin Severity Noted Reaction Type Reactions Latex High 02/16/2017    Hives Tape [Adhesive] High 02/16/2017    Rash Current Immunizations  Reviewed on 10/18/2016 Name Date Influenza High Dose Vaccine PF 10/18/2016 Influenza Vaccine 10/25/2013 Influenza Vaccine PF 10/27/2015 Pneumococcal Polysaccharide (PPSV-23) 3/29/2016 12:55 PM  
  
 Not reviewed this visit You Were Diagnosed With   
  
 Codes Comments BPH (benign prostatic hypertrophy) with urinary retention    -  Primary ICD-10-CM: N40.1, R33.8 ICD-9-CM: 600.01, 788.20 Vitals BP Pulse Temp Height(growth percentile) Weight(growth percentile) SpO2  
 157/66 (BP 1 Location: Left arm, BP Patient Position: Sitting) 94 96.8 °F (36 °C) (Oral) 6' (1.829 m) 171 lb (77.6 kg) 96% BMI Smoking Status 23.19 kg/m2 Former Smoker Vitals History BMI and BSA Data Body Mass Index Body Surface Area  
 23.19 kg/m 2 1.99 m 2 Preferred Pharmacy Pharmacy Name Phone [facility] [phone] [facility] [phone] Your Updated Medication List  
  
   
This list is accurate as of: 4/25/17 11:10 AM.  Always use your most recent med list. amLODIPine 5 mg tablet Commonly known as:  Connie Half TAKE ONE TABLET BY MOUTH ONCE DAILY  
  
 aspirin 81 mg chewable tablet Take 1 Tab by mouth daily. clopidogrel 75 mg Tab Commonly known as:  PLAVIX Take 1 Tab by mouth daily (with dinner). isosorbide mononitrate ER 60 mg CR tablet Commonly known as:  IMDUR  
1 tablet each AM  
  
 levothyroxine 88 mcg tablet Commonly known as:  SYNTHROID  
TAKE ONE TABLET BY MOUTH ONCE DAILY BEFORE BREAKFAST  
  
 lovastatin 20 mg tablet Commonly known as:  MEVACOR Take 1 Tab by mouth daily. MIRALAX PO Take  by mouth as needed. multivitamin with iron tablet Take 1 Tab by mouth daily. oxybutynin 5 mg tablet Commonly known as:  CIVCWAQT Take 1 Tab by mouth three (3) times daily. Indications: BLADDER HYPERACTIVITY, URINARY URGENCY  
  
 oxyCODONE-acetaminophen 7.5-325 mg per tablet Commonly known as:  PERCOCET 7.5  
1 tablet every six hours as needed for pain  
  
 tamsulosin 0.4 mg capsule Commonly known as:  FLOMAX  
1 tablet daily at supper We Performed the Following AMB POC URINALYSIS DIP STICK AUTO W/O MICRO [65813 CPT(R)] TX COLLECTION VENOUS BLOOD,VENIPUNCTURE [id number] CPT(R)] To-Do List   
 04/25/2017 Lab:  PROSTATE SPECIFIC AG (PSA) Patient Instructions Prostate Cancer: Care Instructions Your Care Instructions The prostate gland is a small, walnut-shaped organ that lies just below a man's bladder. It surrounds the urethra, the tube that carries urine from the bladder out of the body through the penis. Prostate cancer is the abnormal growth of cells in the prostate gland. Prostate cancer cells can spread within the prostate, to nearby lymph nodes and other tissues, and to other parts of the body. When the cancer hasn't spread outside the prostate, it is called localized prostate cancer. With localized prostate cancer, your options depend on how likely it is that your cancer will grow. Tests will show if your cancer is likely to grow. · Low-risk cancer isn't likely to grow right away. If your cancer is low-risk, you can choose active surveillance. This means your cancer will be watched closely by your doctor with regular checkups and tests to see if the cancer grows. This choice allows you to delay having surgery or radiation, often for many years. If the cancer grows very slowly, you may never need treatment. · Medium-risk cancer is more likely to grow. Some men with this type of cancer may be able to choose active surveillance. Others may need to choose surgery or radiation. · High-risk cancer is most likely to grow. If you have high-risk cancer, you will likely need to choose surgery or radiation. If your cancer has already spread outside the prostate or to other parts of the body, then you may have other treatments, like chemotherapy or hormone therapy. If you are over age [de-identified] or have other serious health problems, like heart disease, you may choose not to have treatments to cure your cancer. Instead, you can just have treatments to manage your symptoms. This is called watchful waiting. Finding out that you have cancer is scary. You may feel many emotions and may need some help coping. Seek out family, friends, and counselors for support. You also can do things at home to make yourself feel better while you go through treatment. Call the [employer] (6-664.342.9380) or visit its website at 2237 GOPOP.TV. [employer] for more information. Follow-up care is a key part of your treatment and safety. Be sure to make and go to all appointments, and call your doctor if you are having problems. It's also a good idea to know your test results and keep a list of the medicines you take. How can you care for yourself at home? · Your doctor will talk to you about your treatment options. You may need to learn more about each of them before you can decide which treatment is best for you. · Take your medicines exactly as prescribed. Call your doctor if you think you are having a problem with your medicine. You will get more details on the specific medicines your doctor prescribes. · Eat healthy food. If you do not feel like eating, try to eat food that has protein and extra calories to keep up your strength and prevent weight loss. Drink liquid meal replacements for extra calories and protein. Try to eat your main meal early. · Take steps to control your stress and workload. Learn relaxation techniques. ¨ Share your feelings. Stress and tension affect our emotions. By expressing your feelings to others, you may be able to understand and cope with them. ¨ Consider joining a support group. Talking about a problem with your spouse, a good friend, or other people with similar problems is a good way to reduce tension and stress. ¨ Express yourself through art. Try writing, crafts, dance, or art to relieve stress. Some dance, writing, or art groups may be available just for people who have cancer. ¨ Be kind to your body and mind. Getting enough sleep, eating a healthy diet, and taking time to do things you enjoy can contribute to an overall feeling of balance in your life and can help reduce stress. ¨ Get help if you need it. Discuss your concerns with your doctor or counselor. · Get some physical activity every day, but do not get too tired. Keep doing the hobbies you enjoy as your energy allows. · If you are vomiting or have diarrhea: ¨ Drink plenty of fluids (enough so that your urine is light yellow or clear like water) to prevent dehydration. Choose water and other caffeine-free clear liquids.  If you have kidney, heart, or liver disease and have to limit fluids, talk with your doctor before you increase the amount of fluids you drink. ¨ When you are able to eat, try clear soups, mild foods, and liquids until all symptoms are gone for 12 to 48 hours. Jell-O, dry toast, crackers, and cooked cereal are also good choices. · If you have not already done so, prepare a list of advance directives. Advance directives are instructions to your doctor and family members about what kind of care you want if you become unable to speak or express yourself. When should you call for help? Call your doctor now or seek immediate medical care if: 
· You cannot urinate. · You have symptoms of a urinary infection. For example: ¨ You have blood or pus in your urine. ¨ You have pain in your back just below your rib cage. This is called flank pain. ¨ You have a fever, chills, or body aches. ¨ It hurts to urinate. ¨ You have groin or belly pain. · You have pain in your back or hips. · Your pain is not controlled. · You are vomiting or nauseated. Watch closely for changes in your health, and be sure to contact your doctor if: 
· You have pain when you ejaculate. · You have trouble starting or controlling your urine. Where can you learn more? Go to http://robert-leno.info/. Enter V141 in the search box to learn more about \"Prostate Cancer: Care Instructions. \" Current as of: July 26, 2016 Content Version: 11.2 © 2688-6219 [employer]. Care instructions adapted under license by [employer] (which disclaims liability or warranty for this information). If you have questions about a medical condition or this instruction, always ask your healthcare professional. [place] [address] any warranty or liability for your use of this information. Benign Prostatic Hyperplasia: Care Instructions Your Care Instructions Benign prostatic hyperplasia, or BPH, is an enlarged prostate gland.  The prostate is a small gland that makes some of the fluid in semen. Prostate enlargement happens to almost all men as they age. It is usually not serious. BPH does not cause prostate cancer. As the prostate gets bigger, it may partly block the flow of urine. You may have a hard time getting a urine stream started or completely stopped. BPH can cause dribbling. You may have a weak urine stream, or you may have to urinate more often than you used to, especially at night. Most men find these problems easy to manage. You do not need treatment unless your symptoms bother you a lot or you have other problems, such as bladder infections or stones. In these cases, medicines may help. Surgery is not needed unless the urine flow is blocked or the symptoms do not get better with medicine. Follow-up care is a key part of your treatment and safety. Be sure to make and go to all appointments, and call your doctor if you are having problems. It's also a good idea to know your test results and keep a list of the medicines you take. How can you care for yourself at home? · Take plenty of time to urinate. Try to relax. · Try \"double voiding. \" Urinate as much you can, relax for a few moments, and then try to urinate again. · Sit on the toilet to urinate. · Read or think of other things while you are waiting. · Turn on a faucet, or try to picture running water. Some men find that this helps get their urine flowing. · If dribbling is a problem, wash your penis daily to avoid skin irritation and infection. · Avoid caffeine and alcohol. These drinks will increase how often you need to urinate. Spread your fluid intake throughout the day. If the urge to urinate often wakes you at night, limit your fluid intake in the evening. Urinate right before you go to bed. · Many over-the-counter cold and allergy medicines can make the symptoms of BPH worse.  Avoid antihistamines, decongestants, and allergy pills, if you can. Read the warnings on the package. · If you take any prescription medicines, especially tranquilizers or antidepressants, ask your doctor or pharmacist whether they can cause urination problems. There may be other medicines you can use that do not cause urinary problems. · Be safe with medicines. Take your medicines exactly as prescribed. Call your doctor if you think you are having a problem with your medicine. When should you call for help? Call your doctor now or seek immediate medical care if: 
· You cannot urinate at all. · You have symptoms of a urinary infection. For example: ¨ You have blood or pus in your urine. ¨ You have pain in your back just below your rib cage. This is called flank pain. ¨ You have a fever, chills, or body aches. ¨ It hurts to urinate. ¨ You have groin or belly pain. Watch closely for changes in your health, and be sure to contact your doctor if: 
· It hurts when you ejaculate. · Your urinary problems get a lot worse or bother you a lot. Where can you learn more? Go to http://robert-leno.info/. Enter V878 in the search box to learn more about \"Benign Prostatic Hyperplasia: Care Instructions. \" Current as of: May 24, 2016 Content Version: 11.2 © 3329-2577 [employer]. Care instructions adapted under license by [employer] (which disclaims liability or warranty for this information). If you have questions about a medical condition or this instruction, always ask your healthcare professional. [place] [address] any warranty or liability for your use of this information. Please provide this summary of care documentation to your next provider. Your primary care clinician is listed as Josepha Sever. If you have any questions after today's visit, please call [phone].
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
## 2017-05-02 ENCOUNTER — OFFICE VISIT (OUTPATIENT)
Dept: INTERNAL MEDICINE CLINIC | Age: 82
End: 2017-05-02

## 2017-05-02 ENCOUNTER — HOSPITAL ENCOUNTER (OUTPATIENT)
Dept: LAB | Age: 82
Discharge: HOME | End: 2017-05-02
Payer: MEDICARE

## 2017-05-02 VITALS
OXYGEN SATURATION: 98 % | RESPIRATION RATE: 16 BRPM | TEMPERATURE: 97.8 F | BODY MASS INDEX: 23.16 KG/M2 | DIASTOLIC BLOOD PRESSURE: 86 MMHG | SYSTOLIC BLOOD PRESSURE: 136 MMHG | HEIGHT: 72 IN | HEART RATE: 71 BPM | WEIGHT: 171 LBS

## 2017-05-02 DIAGNOSIS — E78.5 DYSLIPIDEMIA: Chronic | ICD-10-CM

## 2017-05-02 DIAGNOSIS — I10 ESSENTIAL HYPERTENSION: Primary | ICD-10-CM

## 2017-05-02 DIAGNOSIS — I10 ESSENTIAL HYPERTENSION: ICD-10-CM

## 2017-05-02 DIAGNOSIS — E03.9 ACQUIRED HYPOTHYROIDISM: Chronic | ICD-10-CM

## 2017-05-02 LAB
ALBUMIN SERPL BCP-MCNC: 3.4 G/DL (ref 3.4–5)
ALBUMIN/GLOB SERPL: 1 {RATIO} (ref 0.8–1.7)
ALP SERPL-CCNC: 126 U/L (ref 45–117)
ALT SERPL-CCNC: 24 U/L (ref 16–61)
ANION GAP BLD CALC-SCNC: 10 MMOL/L (ref 3–18)
AST SERPL W P-5'-P-CCNC: 26 U/L (ref 15–37)
BASOPHILS # BLD AUTO: 0 K/UL (ref 0–0.06)
BASOPHILS # BLD: 1 % (ref 0–2)
BILIRUB SERPL-MCNC: 0.3 MG/DL (ref 0.2–1)
BUN SERPL-MCNC: 26 MG/DL (ref 7–18)
BUN/CREAT SERPL: 25 (ref 12–20)
CALCIUM SERPL-MCNC: 9.4 MG/DL (ref 8.5–10.1)
CHLORIDE SERPL-SCNC: 103 MMOL/L (ref 100–108)
CHOLEST SERPL-MCNC: 235 MG/DL
CO2 SERPL-SCNC: 27 MMOL/L (ref 21–32)
CREAT SERPL-MCNC: 1.05 MG/DL (ref 0.6–1.3)
DIFFERENTIAL METHOD BLD: ABNORMAL
EOSINOPHIL # BLD: 0.2 K/UL (ref 0–0.4)
EOSINOPHIL NFR BLD: 3 % (ref 0–5)
ERYTHROCYTE [DISTWIDTH] IN BLOOD BY AUTOMATED COUNT: 15.7 % (ref 11.6–14.5)
GLOBULIN SER CALC-MCNC: 3.3 G/DL (ref 2–4)
GLUCOSE SERPL-MCNC: 98 MG/DL (ref 74–99)
HCT VFR BLD AUTO: 36.2 % (ref 36–48)
HDLC SERPL-MCNC: 69 MG/DL (ref 40–60)
HDLC SERPL: 3.4 {RATIO} (ref 0–5)
HGB BLD-MCNC: 11.3 G/DL (ref 13–16)
LDLC SERPL CALC-MCNC: 141.2 MG/DL (ref 0–100)
LIPID PROFILE,FLP: ABNORMAL
LYMPHOCYTES # BLD AUTO: 17 % (ref 21–52)
LYMPHOCYTES # BLD: 1.2 K/UL (ref 0.9–3.6)
MCH RBC QN AUTO: 28.8 PG (ref 24–34)
MCHC RBC AUTO-ENTMCNC: 31.2 G/DL (ref 31–37)
MCV RBC AUTO: 92.1 FL (ref 74–97)
MONOCYTES # BLD: 0.6 K/UL (ref 0.05–1.2)
MONOCYTES NFR BLD AUTO: 9 % (ref 3–10)
NEUTS SEG # BLD: 4.9 K/UL (ref 1.8–8)
NEUTS SEG NFR BLD AUTO: 70 % (ref 40–73)
PLATELET # BLD AUTO: 295 K/UL (ref 135–420)
PMV BLD AUTO: 9.9 FL (ref 9.2–11.8)
POTASSIUM SERPL-SCNC: 4.2 MMOL/L (ref 3.5–5.5)
PROT SERPL-MCNC: 6.7 G/DL (ref 6.4–8.2)
RBC # BLD AUTO: 3.93 M/UL (ref 4.7–5.5)
SODIUM SERPL-SCNC: 140 MMOL/L (ref 136–145)
TRIGL SERPL-MCNC: 124 MG/DL (ref ?–150)
TSH SERPL DL<=0.05 MIU/L-ACNC: 1.66 UIU/ML (ref 0.36–3.74)
VLDLC SERPL CALC-MCNC: 24.8 MG/DL
WBC # BLD AUTO: 7 K/UL (ref 4.6–13.2)

## 2017-05-02 PROCEDURE — 80053 COMPREHEN METABOLIC PANEL: CPT | Performed by: NURSE PRACTITIONER

## 2017-05-02 PROCEDURE — 85025 COMPLETE CBC W/AUTO DIFF WBC: CPT | Performed by: NURSE PRACTITIONER

## 2017-05-02 PROCEDURE — 84443 ASSAY THYROID STIM HORMONE: CPT | Performed by: NURSE PRACTITIONER

## 2017-05-02 PROCEDURE — 80061 LIPID PANEL: CPT | Performed by: NURSE PRACTITIONER

## 2017-05-02 NOTE — MR AVS SNAPSHOT
Visit Information Date & Time Provider Department Dept. Phone Encounter #  
 5/2/2017  9:15 AM Jn Doe NP [facility] [place] [phone] [id number] Your Appointments 9/12/2017  9:15 AM  
Follow Up with Jn Doe NP  
[facility] [place] ([address]) Appt Note: 4 mos [address], [address]  
  
   
 [address], [address] [place] [address]  
  
    
 10/24/2017 10:00 AM  
ULTRASOUND with Kirit Salazar MD  
[facility] [address]) Appt Note: RES  
 [address] [address]  
[phone] [address] [place] [address] Upcoming Health Maintenance Date Due DTaP/Tdap/Td series (1 - Tdap) 10/15/1956 ZOSTER VACCINE AGE 60> 10/15/1997 GLAUCOMA SCREENING Q2Y 10/15/2002 Pneumococcal 65+ Low/Medium Risk (2 of 2 - PCV13) 3/29/2017 INFLUENZA AGE 9 TO ADULT 8/1/2017 MEDICARE YEARLY EXAM 10/19/2017 Allergies as of 5/2/2017  Review Complete On: 5/2/2017 By: Jn Doe NP Severity Noted Reaction Type Reactions Latex High 02/16/2017    Hives Tape [Adhesive] High 02/16/2017    Rash Current Immunizations  Reviewed on 10/18/2016 Name Date Influenza High Dose Vaccine PF 10/18/2016 Influenza Vaccine 10/25/2013 Influenza Vaccine PF 10/27/2015 Pneumococcal Polysaccharide (PPSV-23) 3/29/2016 12:55 PM  
  
 Not reviewed this visit You Were Diagnosed With   
  
 Codes Comments Essential hypertension    -  Primary ICD-10-CM: I10 
ICD-9-CM: 401.9 Acquired hypothyroidism     ICD-10-CM: E03.9 ICD-9-CM: 554. 9 Dyslipidemia     ICD-10-CM: E78.5 ICD-9-CM: 272.4 Vitals BP Pulse Temp Resp Height(growth percentile)  136/86 (BP 1 Location: Left arm, BP Patient Position: Sitting) 71 97.8 °F (36.6 °C) (Tympanic) 16 6' (1.829 m) Weight(growth percentile) SpO2 BMI Smoking Status 171 lb (77.6 kg) 98% 23.19 kg/m2 Former Smoker BMI and BSA Data Body Mass Index Body Surface Area  
 23.19 kg/m 2 1.99 m 2 Preferred Pharmacy Pharmacy Name Phone [phone]  [phone] Your Updated Medication List  
  
   
This list is accurate as of: 5/2/17  9:49 AM.  Always use your most recent med list. amLODIPine 5 mg tablet Commonly known as:  Rosanatashand Jaksch TAKE ONE TABLET BY MOUTH ONCE DAILY  
  
 aspirin 81 mg chewable tablet Take 1 Tab by mouth daily. clopidogrel 75 mg Tab Commonly known as:  PLAVIX Take 1 Tab by mouth daily (with dinner). isosorbide mononitrate ER 60 mg CR tablet Commonly known as:  IMDUR  
1 tablet each AM  
  
 levothyroxine 88 mcg tablet Commonly known as:  SYNTHROID  
TAKE ONE TABLET BY MOUTH ONCE DAILY BEFORE BREAKFAST  
  
 lovastatin 20 mg tablet Commonly known as:  MEVACOR Take 1 Tab by mouth daily. MIRALAX PO Take  by mouth as needed. multivitamin with iron tablet Take 1 Tab by mouth daily. oxybutynin 5 mg tablet Commonly known as:  EALDKZRJ Take 1 Tab by mouth three (3) times daily. Indications: BLADDER HYPERACTIVITY, URINARY URGENCY  
  
 tamsulosin 0.4 mg capsule Commonly known as:  FLOMAX  
1 tablet daily at supper To-Do List   
 05/02/2017 Lab:  CBC WITH AUTOMATED DIFF   
  
 05/02/2017 Lab:  LIPID PANEL   
  
 05/02/2017 Lab:  METABOLIC PANEL, COMPREHENSIVE   
  
 05/02/2017 Lab:  TSH 3RD GENERATION Please provide this summary of care documentation to your next provider. Your primary care clinician is listed as Janneth Carballo. If you have any questions after today's visit, please call [phone].

## 2017-05-02 NOTE — PROGRESS NOTES
Patient presents for   Chief Complaint   Patient presents with    Well Male     2 month follow up     Fall risk assessment was not indicated. Depression screening was not indicated Follow up questions were not indicated. 1. Have you been to the ER, urgent care clinic since your last visit? Hospitalized since your last visit? No    2. Have you seen or consulted any other health care providers outside of the [address] [place] [address] since your last visit? Include any pap smears or colon screening.  No
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
## 2017-05-04 NOTE — PROGRESS NOTES
Please let the patient know his labs are essentially good. A little rise in his bad cholesterol and cholesterol level. Astrid Mukherjee

## 2017-05-09 ENCOUNTER — TELEPHONE (OUTPATIENT)
Dept: INTERNAL MEDICINE CLINIC | Age: 82
End: 2017-05-09

## 2017-05-09 NOTE — TELEPHONE ENCOUNTER
----- Message from Jayesh Valdez NP sent at 5/4/2017  1:52 PM EDT -----  Please let the patient know his labs are essentially good. A little rise in his bad cholesterol and cholesterol level. Nati Shapre

## 2017-05-09 NOTE — TELEPHONE ENCOUNTER
Contacted Rubina Olsen and informed Him of lab results per Rafael Granado NP's request. Rubina Olsen expressed understanding.
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
## 2017-10-24 NOTE — PROGRESS NOTES
Mr. Dasha Garza has a reminder for a \"due or due soon\" health maintenance. I have asked that he contact his primary care provider for follow-up on this health maintenance.

## 2017-10-24 NOTE — PROGRESS NOTES
Please let the patient know his blood count has drooped since last labs. He can follow-up with GI. Want to make sure he has know additional rectal bleeding.
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
## 2017-10-24 NOTE — PATIENT INSTRUCTIONS

## 2017-10-25 NOTE — TELEPHONE ENCOUNTER
----- Message from Radha Whiteside NP sent at 10/24/2017  3:33 PM EDT -----  Please let the patient know his blood count has drooped since last labs. He can follow-up with GI. Want to make sure he has know additional rectal bleeding.

## 2017-10-25 NOTE — TELEPHONE ENCOUNTER
Contacted Josue and informed Him of lab results and recommendation to see GI per Terrence Chou NP's request. Mr Estefani Castano stated he does have some bleeding caused by hard stools and that he will contact GI and contact us if he needs anything. Josue expressed understanding.

## 2017-11-08 NOTE — TELEPHONE ENCOUNTER
Requested Prescriptions     Pending Prescriptions Disp Refills    oxybutynin (DITROPAN) 5 mg tablet 90 Tab 6     Sig: Take 1 Tab by mouth three (3) times daily.  Indications: Bladder Hyperactivity, URINARY URGENCY

## 2018-01-01 ENCOUNTER — OFFICE VISIT (OUTPATIENT)
Dept: PULMONOLOGY | Age: 83
End: 2018-01-01

## 2018-01-01 ENCOUNTER — HOME CARE VISIT (OUTPATIENT)
Dept: HOSPICE | Facility: HOSPICE | Age: 83
End: 2018-01-01
Payer: MEDICARE

## 2018-01-01 ENCOUNTER — OFFICE VISIT (OUTPATIENT)
Dept: INTERNAL MEDICINE CLINIC | Age: 83
End: 2018-01-01

## 2018-01-01 ENCOUNTER — HOSPITAL ENCOUNTER (OUTPATIENT)
Dept: VASCULAR SURGERY | Age: 83
Discharge: HOME | End: 2018-03-07
Attending: INTERNAL MEDICINE
Payer: MEDICARE

## 2018-01-01 ENCOUNTER — APPOINTMENT (OUTPATIENT)
Dept: GENERAL RADIOLOGY | Age: 83
End: 2018-01-01
Attending: INTERNAL MEDICINE
Payer: MEDICARE

## 2018-01-01 ENCOUNTER — TELEPHONE (OUTPATIENT)
Dept: PULMONOLOGY | Age: 83
End: 2018-01-01

## 2018-01-01 ENCOUNTER — HOSPITAL ENCOUNTER (OUTPATIENT)
Dept: CT IMAGING | Age: 83
Discharge: HOME | End: 2018-01-19
Attending: INTERNAL MEDICINE
Payer: MEDICARE

## 2018-01-01 ENCOUNTER — TELEPHONE (OUTPATIENT)
Dept: VASCULAR SURGERY | Age: 83
End: 2018-01-01

## 2018-01-01 ENCOUNTER — HOSPITAL ENCOUNTER (OUTPATIENT)
Age: 83
Setting detail: OUTPATIENT SURGERY
Discharge: HOME | End: 2018-02-08
Attending: INTERNAL MEDICINE | Admitting: INTERNAL MEDICINE
Payer: MEDICARE

## 2018-01-01 ENCOUNTER — DOCUMENTATION ONLY (OUTPATIENT)
Dept: PULMONOLOGY | Age: 83
End: 2018-01-01

## 2018-01-01 ENCOUNTER — HOSPICE ADMISSION (OUTPATIENT)
Dept: HOSPICE | Facility: HOSPICE | Age: 83
End: 2018-01-01
Payer: MEDICARE

## 2018-01-01 ENCOUNTER — OFFICE VISIT (OUTPATIENT)
Dept: UROLOGY | Age: 83
End: 2018-01-01

## 2018-01-01 ENCOUNTER — HOSPITAL ENCOUNTER (OUTPATIENT)
Dept: CT IMAGING | Age: 83
Discharge: HOME | End: 2018-02-06
Attending: RADIOLOGY | Admitting: RADIOLOGY
Payer: MEDICARE

## 2018-01-01 ENCOUNTER — ANESTHESIA (OUTPATIENT)
Dept: ENDOSCOPY | Age: 83
End: 2018-01-01
Payer: MEDICARE

## 2018-01-01 ENCOUNTER — HOSPITAL ENCOUNTER (OUTPATIENT)
Dept: GENERAL RADIOLOGY | Age: 83
Discharge: HOME | End: 2018-01-10
Payer: MEDICARE

## 2018-01-01 ENCOUNTER — HOSPITAL ENCOUNTER (OUTPATIENT)
Dept: MRI IMAGING | Age: 83
Discharge: HOME | End: 2018-03-02
Attending: INTERNAL MEDICINE
Payer: MEDICARE

## 2018-01-01 ENCOUNTER — HOSPITAL ENCOUNTER (EMERGENCY)
Age: 83
Discharge: HOME | End: 2018-06-15
Attending: EMERGENCY MEDICINE
Payer: MEDICARE

## 2018-01-01 ENCOUNTER — APPOINTMENT (OUTPATIENT)
Dept: GENERAL RADIOLOGY | Age: 83
End: 2018-01-01
Attending: RADIOLOGY
Payer: MEDICARE

## 2018-01-01 ENCOUNTER — ANESTHESIA EVENT (OUTPATIENT)
Dept: ENDOSCOPY | Age: 83
End: 2018-01-01
Payer: MEDICARE

## 2018-01-01 ENCOUNTER — TELEPHONE (OUTPATIENT)
Dept: INTERNAL MEDICINE CLINIC | Age: 83
End: 2018-01-01

## 2018-01-01 ENCOUNTER — HOSPITAL ENCOUNTER (OUTPATIENT)
Dept: CT IMAGING | Age: 83
Discharge: HOME | End: 2018-05-23
Attending: INTERNAL MEDICINE
Payer: MEDICARE

## 2018-01-01 ENCOUNTER — DOCUMENTATION ONLY (OUTPATIENT)
Dept: VASCULAR SURGERY | Age: 83
End: 2018-01-01

## 2018-01-01 ENCOUNTER — HOSPITAL ENCOUNTER (OUTPATIENT)
Dept: PET IMAGING | Age: 83
Discharge: HOME | End: 2018-02-01
Attending: INTERNAL MEDICINE
Payer: MEDICARE

## 2018-01-01 VITALS
TEMPERATURE: 96.3 F | BODY MASS INDEX: 23 KG/M2 | OXYGEN SATURATION: 95 % | HEIGHT: 72 IN | WEIGHT: 169.8 LBS | HEART RATE: 90 BPM | DIASTOLIC BLOOD PRESSURE: 73 MMHG | RESPIRATION RATE: 16 BRPM | SYSTOLIC BLOOD PRESSURE: 147 MMHG

## 2018-01-01 VITALS
RESPIRATION RATE: 16 BRPM | SYSTOLIC BLOOD PRESSURE: 126 MMHG | TEMPERATURE: 98.2 F | HEIGHT: 72 IN | HEART RATE: 60 BPM | OXYGEN SATURATION: 99 % | DIASTOLIC BLOOD PRESSURE: 50 MMHG | WEIGHT: 171.38 LBS | BODY MASS INDEX: 23.21 KG/M2

## 2018-01-01 VITALS
WEIGHT: 178 LBS | TEMPERATURE: 97.5 F | HEART RATE: 78 BPM | DIASTOLIC BLOOD PRESSURE: 72 MMHG | HEIGHT: 72 IN | SYSTOLIC BLOOD PRESSURE: 140 MMHG | BODY MASS INDEX: 24.11 KG/M2 | RESPIRATION RATE: 18 BRPM

## 2018-01-01 VITALS
SYSTOLIC BLOOD PRESSURE: 125 MMHG | WEIGHT: 174 LBS | BODY MASS INDEX: 23.57 KG/M2 | HEART RATE: 77 BPM | HEIGHT: 72 IN | DIASTOLIC BLOOD PRESSURE: 60 MMHG | OXYGEN SATURATION: 95 %

## 2018-01-01 VITALS
SYSTOLIC BLOOD PRESSURE: 116 MMHG | RESPIRATION RATE: 16 BRPM | WEIGHT: 172 LBS | HEART RATE: 74 BPM | HEIGHT: 71 IN | BODY MASS INDEX: 24.08 KG/M2 | TEMPERATURE: 98.9 F | DIASTOLIC BLOOD PRESSURE: 60 MMHG | OXYGEN SATURATION: 89 %

## 2018-01-01 VITALS
HEIGHT: 72 IN | SYSTOLIC BLOOD PRESSURE: 110 MMHG | DIASTOLIC BLOOD PRESSURE: 64 MMHG | HEART RATE: 86 BPM | WEIGHT: 175 LBS | OXYGEN SATURATION: 97 % | TEMPERATURE: 97.9 F | RESPIRATION RATE: 20 BRPM | BODY MASS INDEX: 23.7 KG/M2

## 2018-01-01 VITALS
BODY MASS INDEX: 23.2 KG/M2 | HEIGHT: 72 IN | WEIGHT: 171.3 LBS | TEMPERATURE: 97 F | OXYGEN SATURATION: 98 % | RESPIRATION RATE: 20 BRPM | DIASTOLIC BLOOD PRESSURE: 56 MMHG | SYSTOLIC BLOOD PRESSURE: 104 MMHG | HEART RATE: 90 BPM

## 2018-01-01 VITALS
OXYGEN SATURATION: 96 % | HEART RATE: 93 BPM | TEMPERATURE: 99.2 F | SYSTOLIC BLOOD PRESSURE: 126 MMHG | RESPIRATION RATE: 20 BRPM | DIASTOLIC BLOOD PRESSURE: 53 MMHG

## 2018-01-01 DIAGNOSIS — R91.8 MASS OF UPPER LOBE OF LEFT LUNG: ICD-10-CM

## 2018-01-01 DIAGNOSIS — R05.8 RECURRENT DRY COUGH: ICD-10-CM

## 2018-01-01 DIAGNOSIS — R91.8 MASS OF RIGHT LUNG: Primary | ICD-10-CM

## 2018-01-01 DIAGNOSIS — R91.8 MASS OF UPPER LOBE OF LEFT LUNG: Primary | ICD-10-CM
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
## 2018-01-01 DIAGNOSIS — J43.2 CENTRILOBULAR EMPHYSEMA (HCC): Primary | ICD-10-CM

## 2018-01-01 DIAGNOSIS — R91.8 LUNG MASS: Primary | ICD-10-CM

## 2018-01-01 DIAGNOSIS — G89.29 OTHER CHRONIC PAIN: Primary | ICD-10-CM

## 2018-01-01 DIAGNOSIS — C34.11 MALIGNANT NEOPLASM OF UPPER LOBE OF RIGHT LUNG (HCC): ICD-10-CM

## 2018-01-01 DIAGNOSIS — R91.8 MASS OF UPPER LOBE OF RIGHT LUNG: ICD-10-CM

## 2018-01-01 DIAGNOSIS — E03.9 HYPOTHYROIDISM, UNSPECIFIED TYPE: Chronic | ICD-10-CM

## 2018-01-01 DIAGNOSIS — R60.9 EDEMA: ICD-10-CM

## 2018-01-01 DIAGNOSIS — R30.0 DYSURIA: ICD-10-CM

## 2018-01-01 DIAGNOSIS — R91.8 LUNG MASS: ICD-10-CM

## 2018-01-01 DIAGNOSIS — N40.1 BENIGN PROSTATIC HYPERPLASIA WITH LOWER URINARY TRACT SYMPTOMS, SYMPTOM DETAILS UNSPECIFIED: ICD-10-CM

## 2018-01-01 DIAGNOSIS — R06.02 SHORTNESS OF BREATH: ICD-10-CM

## 2018-01-01 DIAGNOSIS — J43.2 CENTRILOBULAR EMPHYSEMA (HCC): ICD-10-CM

## 2018-01-01 DIAGNOSIS — N30.40 RADIATION CYSTITIS: Primary | ICD-10-CM

## 2018-01-01 DIAGNOSIS — R05.8 RECURRENT DRY COUGH: Primary | ICD-10-CM

## 2018-01-01 DIAGNOSIS — C21.0 MALIGNANT NEOPLASM OF ANUS (HCC): ICD-10-CM

## 2018-01-01 DIAGNOSIS — R06.09 DYSPNEA ON EXERTION: ICD-10-CM

## 2018-01-01 DIAGNOSIS — C34.12 PRIMARY MALIGNANT NEOPLASM OF BRONCHUS OF LEFT UPPER LOBE (HCC): ICD-10-CM

## 2018-01-01 DIAGNOSIS — I10 ESSENTIAL HYPERTENSION: ICD-10-CM

## 2018-01-01 DIAGNOSIS — C34.92 SQUAMOUS CELL LUNG CANCER, LEFT (HCC): ICD-10-CM

## 2018-01-01 DIAGNOSIS — R91.8 MASS OF LEFT LUNG: ICD-10-CM

## 2018-01-01 DIAGNOSIS — N32.81 OAB (OVERACTIVE BLADDER): ICD-10-CM

## 2018-01-01 DIAGNOSIS — R91.8 MASS OF UPPER LOBE OF RIGHT LUNG: Primary | ICD-10-CM

## 2018-01-01 DIAGNOSIS — I73.9 PAD (PERIPHERAL ARTERY DISEASE) (HCC): ICD-10-CM

## 2018-01-01 DIAGNOSIS — N30.40 RADIATION CYSTITIS: ICD-10-CM

## 2018-01-01 DIAGNOSIS — K30 INDIGESTION: ICD-10-CM

## 2018-01-01 DIAGNOSIS — R49.0 HOARSENESS: ICD-10-CM

## 2018-01-01 DIAGNOSIS — C34.91 SQUAMOUS CELL CARCINOMA OF RIGHT LUNG (HCC): ICD-10-CM

## 2018-01-01 LAB
ANION GAP SERPL CALC-SCNC: 7 MMOL/L (ref 3–18)
APTT PPP: 33.3 SEC (ref 23–36.4)
BILIRUB UR QL STRIP: NEGATIVE
BUN SERPL-MCNC: 16 MG/DL (ref 7–18)
BUN/CREAT SERPL: 17 (ref 12–20)
CALCIUM SERPL-MCNC: 9.4 MG/DL (ref 8.5–10.1)
CHLORIDE SERPL-SCNC: 104 MMOL/L (ref 100–108)
CO2 SERPL-SCNC: 28 MMOL/L (ref 21–32)
CREAT SERPL-MCNC: 0.93 MG/DL (ref 0.6–1.3)
CREAT UR-MCNC: 0.8 MG/DL (ref 0.6–1.3)
CREAT UR-MCNC: 0.8 MG/DL (ref 0.6–1.3)
ERYTHROCYTE [DISTWIDTH] IN BLOOD BY AUTOMATED COUNT: 14.5 % (ref 11.6–14.5)
GLUCOSE SERPL-MCNC: 107 MG/DL (ref 74–99)
GLUCOSE UR-MCNC: NEGATIVE MG/DL
HCT VFR BLD AUTO: 34.4 % (ref 36–48)
HGB BLD-MCNC: 11.2 G/DL (ref 13–16)
INR PPP: 1 (ref 0.8–1.2)
KETONES P FAST UR STRIP-MCNC: NEGATIVE MG/DL
MCH RBC QN AUTO: 27.3 PG (ref 24–34)
MCHC RBC AUTO-ENTMCNC: 32.6 G/DL (ref 31–37)
MCV RBC AUTO: 83.7 FL (ref 74–97)
PH UR STRIP: 5.5 [PH] (ref 4.6–8)
PLATELET # BLD AUTO: 367 K/UL (ref 135–420)
PMV BLD AUTO: 9.2 FL (ref 9.2–11.8)
POTASSIUM SERPL-SCNC: 4.1 MMOL/L (ref 3.5–5.5)
PROT UR QL STRIP: NEGATIVE
PROTHROMBIN TIME: 13 SEC (ref 11.5–15.2)
RBC # BLD AUTO: 4.11 M/UL (ref 4.7–5.5)
SODIUM SERPL-SCNC: 139 MMOL/L (ref 136–145)
SP GR UR STRIP: 1.01 (ref 1–1.03)
UA UROBILINOGEN AMB POC: NORMAL (ref 0.2–1)
URINALYSIS CLARITY POC: CLEAR
URINALYSIS COLOR POC: YELLOW
URINE BLOOD POC: NEGATIVE
URINE LEUKOCYTES POC: NEGATIVE
URINE NITRITES POC: NEGATIVE
WBC # BLD AUTO: 8.1 K/UL (ref 4.6–13.2)

## 2018-01-01 PROCEDURE — 88333 PATH CONSLTJ SURG CYTO XM 1: CPT | Performed by: RADIOLOGY

## 2018-01-01 PROCEDURE — 82565 ASSAY OF CREATININE: CPT

## 2018-01-01 PROCEDURE — 77030003454 HC NDL BIOP BRONCH BSC -B: Performed by: INTERNAL MEDICINE

## 2018-01-01 PROCEDURE — G0299 HHS/HOSPICE OF RN EA 15 MIN: HCPCS

## 2018-01-01 PROCEDURE — 71260 CT THORAX DX C+: CPT

## 2018-01-01 PROCEDURE — 77012 CT SCAN FOR NEEDLE BIOPSY: CPT

## 2018-01-01 PROCEDURE — 85027 COMPLETE CBC AUTOMATED: CPT | Performed by: INTERNAL MEDICINE

## 2018-01-01 PROCEDURE — 71045 X-RAY EXAM CHEST 1 VIEW: CPT

## 2018-01-01 PROCEDURE — 70553 MRI BRAIN STEM W/O & W/DYE: CPT

## 2018-01-01 PROCEDURE — 77030003400 HC NDL ASPIR BIOP CNMD -B: Performed by: INTERNAL MEDICINE

## 2018-01-01 PROCEDURE — 74011250636 HC RX REV CODE- 250/636

## 2018-01-01 PROCEDURE — 88334 PATH CONSLTJ SURG CYTO XM EA: CPT | Performed by: RADIOLOGY

## 2018-01-01 PROCEDURE — 88172 CYTP DX EVAL FNA 1ST EA SITE: CPT | Performed by: INTERNAL MEDICINE

## 2018-01-01 PROCEDURE — 85730 THROMBOPLASTIN TIME PARTIAL: CPT | Performed by: INTERNAL MEDICINE

## 2018-01-01 PROCEDURE — 88112 CYTOPATH CELL ENHANCE TECH: CPT | Performed by: INTERNAL MEDICINE

## 2018-01-01 PROCEDURE — 93971 EXTREMITY STUDY: CPT

## 2018-01-01 PROCEDURE — 76060000035 HC ANESTHESIA 2 TO 2.5 HR: Performed by: INTERNAL MEDICINE

## 2018-01-01 PROCEDURE — 74011000250 HC RX REV CODE- 250

## 2018-01-01 PROCEDURE — 77030022556 HC FCPS BIOP TIS ENDOSC BSC -B: Performed by: INTERNAL MEDICINE

## 2018-01-01 PROCEDURE — 74011250637 HC RX REV CODE- 250/637: Performed by: NURSE ANESTHETIST, CERTIFIED REGISTERED

## 2018-01-01 PROCEDURE — 0651 HSPC ROUTINE HOME CARE

## 2018-01-01 PROCEDURE — 77030012699 HC VLV SUC CNTRL OCOA -A: Performed by: INTERNAL MEDICINE

## 2018-01-01 PROCEDURE — 88360 TUMOR IMMUNOHISTOCHEM/MANUAL: CPT | Performed by: RADIOLOGY

## 2018-01-01 PROCEDURE — 88305 TISSUE EXAM BY PATHOLOGIST: CPT | Performed by: RADIOLOGY

## 2018-01-01 PROCEDURE — 77030026438 HC STYL ET INTUB CARD -A: Performed by: NURSE ANESTHETIST, CERTIFIED REGISTERED

## 2018-01-01 PROCEDURE — 85610 PROTHROMBIN TIME: CPT | Performed by: INTERNAL MEDICINE

## 2018-01-01 PROCEDURE — 74011250636 HC RX REV CODE- 250/636: Performed by: INTERNAL MEDICINE

## 2018-01-01 PROCEDURE — 77030029383 HC FCPS ENDO BIOP PULM BSC -B: Performed by: INTERNAL MEDICINE

## 2018-01-01 PROCEDURE — 3336500001 HSPC ELECTION

## 2018-01-01 PROCEDURE — 77030009046 HC CATH BRNCH BLLN OCOA -B: Performed by: INTERNAL MEDICINE

## 2018-01-01 PROCEDURE — 74011636320 HC RX REV CODE- 636/320: Performed by: INTERNAL MEDICINE

## 2018-01-01 PROCEDURE — A9552 F18 FDG: HCPCS

## 2018-01-01 PROCEDURE — 88305 TISSUE EXAM BY PATHOLOGIST: CPT | Performed by: INTERNAL MEDICINE

## 2018-01-01 PROCEDURE — 88173 CYTOPATH EVAL FNA REPORT: CPT | Performed by: INTERNAL MEDICINE

## 2018-01-01 PROCEDURE — A9575 INJ GADOTERATE MEGLUMI 0.1ML: HCPCS | Performed by: INTERNAL MEDICINE

## 2018-01-01 PROCEDURE — 77030013140 HC MSK NEB VYRM -A: Performed by: INTERNAL MEDICINE

## 2018-01-01 PROCEDURE — 80048 BASIC METABOLIC PNL TOTAL CA: CPT | Performed by: INTERNAL MEDICINE

## 2018-01-01 PROCEDURE — 77030018823 HC SLV COMPR VENO -B: Performed by: INTERNAL MEDICINE

## 2018-01-01 PROCEDURE — 77030037590 HC NDL ASPIR BIOP OCOA -D: Performed by: INTERNAL MEDICINE

## 2018-01-01 PROCEDURE — 77030008683 HC TU ET CUF COVD -A: Performed by: NURSE ANESTHETIST, CERTIFIED REGISTERED

## 2018-01-01 PROCEDURE — 76040000003: Performed by: INTERNAL MEDICINE

## 2018-01-01 PROCEDURE — 71046 X-RAY EXAM CHEST 2 VIEWS: CPT

## 2018-01-01 PROCEDURE — 99284 EMERGENCY DEPT VISIT MOD MDM: CPT

## 2018-01-01 PROCEDURE — 74011250636 HC RX REV CODE- 250/636: Performed by: NURSE ANESTHETIST, CERTIFIED REGISTERED

## 2018-01-01 PROCEDURE — 77030012012 HC AIRWY OP SFT TELE -A: Performed by: NURSE ANESTHETIST, CERTIFIED REGISTERED

## 2018-01-01 RX ORDER — FENTANYL CITRATE 50 UG/ML
50 INJECTION, SOLUTION INTRAMUSCULAR; INTRAVENOUS
Status: DISCONTINUED | OUTPATIENT
Start: 2018-01-01 | End: 2018-01-01

## 2018-01-01 RX ORDER — LIDOCAINE HYDROCHLORIDE 20 MG/ML
INJECTION, SOLUTION EPIDURAL; INFILTRATION; INTRACAUDAL; PERINEURAL AS NEEDED
Status: DISCONTINUED | OUTPATIENT
Start: 2018-01-01 | End: 2018-01-01 | Stop reason: HOSPADM

## 2018-01-01 RX ORDER — ONDANSETRON 2 MG/ML
INJECTION INTRAMUSCULAR; INTRAVENOUS AS NEEDED
Status: DISCONTINUED | OUTPATIENT
Start: 2018-01-01 | End: 2018-01-01 | Stop reason: HOSPADM

## 2018-01-01 RX ORDER — SODIUM CHLORIDE 9 MG/ML
20 INJECTION, SOLUTION INTRAVENOUS CONTINUOUS
Status: DISCONTINUED | OUTPATIENT
Start: 2018-01-01 | End: 2018-01-01 | Stop reason: HOSPADM

## 2018-01-01 RX ORDER — SODIUM CHLORIDE 0.9 % (FLUSH) 0.9 %
5-10 SYRINGE (ML) INJECTION EVERY 8 HOURS
Status: DISCONTINUED | OUTPATIENT
Start: 2018-01-01 | End: 2018-01-01 | Stop reason: HOSPADM

## 2018-01-01 RX ORDER — FLUMAZENIL 0.1 MG/ML
0.2 INJECTION INTRAVENOUS
Status: DISCONTINUED | OUTPATIENT
Start: 2018-01-01 | End: 2018-01-01 | Stop reason: HOSPADM

## 2018-01-01 RX ORDER — CLOPIDOGREL BISULFATE 75 MG/1
TABLET ORAL
Qty: 30 TAB | Refills: 0 | Status: SHIPPED | OUTPATIENT
Start: 2018-01-01

## 2018-01-01 RX ORDER — FAMOTIDINE 20 MG/1
20 TABLET, FILM COATED ORAL ONCE
Status: COMPLETED | OUTPATIENT
Start: 2018-01-01 | End: 2018-01-01

## 2018-01-01 RX ORDER — SODIUM CHLORIDE 0.9 % (FLUSH) 0.9 %
5-10 SYRINGE (ML) INJECTION AS NEEDED
Status: DISCONTINUED | OUTPATIENT
Start: 2018-01-01 | End: 2018-01-01 | Stop reason: HOSPADM

## 2018-01-01 RX ORDER — FENTANYL CITRATE 50 UG/ML
INJECTION, SOLUTION INTRAMUSCULAR; INTRAVENOUS
Status: COMPLETED
Start: 2018-01-01 | End: 2018-01-01

## 2018-01-01 RX ORDER — AMLODIPINE BESYLATE 5 MG/1
TABLET ORAL
Qty: 90 TAB | Refills: 3 | Status: SHIPPED | OUTPATIENT
Start: 2018-01-01

## 2018-01-01 RX ORDER — CLOPIDOGREL BISULFATE 75 MG/1
TABLET ORAL
Qty: 90 TAB | Refills: 3 | Status: SHIPPED | OUTPATIENT
Start: 2018-01-01 | End: 2018-01-01 | Stop reason: SDUPTHER

## 2018-01-01 RX ORDER — OXYBUTYNIN CHLORIDE 5 MG/1
5 TABLET ORAL 3 TIMES DAILY
Qty: 270 TAB | Refills: 3 | Status: SHIPPED | OUTPATIENT
Start: 2018-01-01

## 2018-01-01 RX ORDER — PROPOFOL 10 MG/ML
INJECTION, EMULSION INTRAVENOUS AS NEEDED
Status: DISCONTINUED | OUTPATIENT
Start: 2018-01-01 | End: 2018-01-01 | Stop reason: HOSPADM

## 2018-01-01 RX ORDER — SODIUM CHLORIDE, SODIUM LACTATE, POTASSIUM CHLORIDE, CALCIUM CHLORIDE 600; 310; 30; 20 MG/100ML; MG/100ML; MG/100ML; MG/100ML
50 INJECTION, SOLUTION INTRAVENOUS CONTINUOUS
Status: DISCONTINUED | OUTPATIENT
Start: 2018-01-01 | End: 2018-01-01 | Stop reason: HOSPADM

## 2018-01-01 RX ORDER — LIDOCAINE HYDROCHLORIDE 10 MG/ML
0.1 INJECTION, SOLUTION EPIDURAL; INFILTRATION; INTRACAUDAL; PERINEURAL AS NEEDED
Status: DISCONTINUED | OUTPATIENT
Start: 2018-01-01 | End: 2018-01-01 | Stop reason: HOSPADM

## 2018-01-01 RX ORDER — FENTANYL CITRATE 50 UG/ML
INJECTION, SOLUTION INTRAMUSCULAR; INTRAVENOUS AS NEEDED
Status: DISCONTINUED | OUTPATIENT
Start: 2018-01-01 | End: 2018-01-01 | Stop reason: HOSPADM

## 2018-01-01 RX ORDER — LOVASTATIN 20 MG/1
20 TABLET ORAL DAILY
Qty: 90 TAB | Refills: 3 | Status: SHIPPED | OUTPATIENT
Start: 2018-01-01

## 2018-01-01 RX ORDER — DEXAMETHASONE SODIUM PHOSPHATE 4 MG/ML
INJECTION, SOLUTION INTRA-ARTICULAR; INTRALESIONAL; INTRAMUSCULAR; INTRAVENOUS; SOFT TISSUE AS NEEDED
Status: DISCONTINUED | OUTPATIENT
Start: 2018-01-01 | End: 2018-01-01 | Stop reason: HOSPADM

## 2018-01-01 RX ORDER — LEVOTHYROXINE SODIUM 88 UG/1
TABLET ORAL
Qty: 90 TAB | Refills: 3 | Status: SHIPPED | OUTPATIENT
Start: 2018-01-01

## 2018-01-01 RX ORDER — NALOXONE HYDROCHLORIDE 0.4 MG/ML
0.1 INJECTION, SOLUTION INTRAMUSCULAR; INTRAVENOUS; SUBCUTANEOUS
Status: DISCONTINUED | OUTPATIENT
Start: 2018-01-01 | End: 2018-01-01 | Stop reason: HOSPADM

## 2018-01-01 RX ORDER — FENTANYL CITRATE 50 UG/ML
25 INJECTION, SOLUTION INTRAMUSCULAR; INTRAVENOUS
Status: DISCONTINUED | OUTPATIENT
Start: 2018-01-01 | End: 2018-01-01 | Stop reason: HOSPADM

## 2018-01-01 RX ORDER — CLOPIDOGREL BISULFATE 75 MG/1
TABLET ORAL
Qty: 90 TAB | Refills: 6 | Status: SHIPPED | OUTPATIENT
Start: 2018-01-01 | End: 2018-01-01 | Stop reason: SDUPTHER

## 2018-01-01 RX ORDER — SUCCINYLCHOLINE CHLORIDE 20 MG/ML
INJECTION INTRAMUSCULAR; INTRAVENOUS AS NEEDED
Status: DISCONTINUED | OUTPATIENT
Start: 2018-01-01 | End: 2018-01-01 | Stop reason: HOSPADM

## 2018-01-01 RX ORDER — ISOSORBIDE MONONITRATE 60 MG/1
TABLET, EXTENDED RELEASE ORAL
Qty: 90 TAB | Refills: 3 | Status: SHIPPED | OUTPATIENT
Start: 2018-01-01

## 2018-01-01 RX ORDER — MIDAZOLAM HYDROCHLORIDE 1 MG/ML
INJECTION, SOLUTION INTRAMUSCULAR; INTRAVENOUS
Status: COMPLETED
Start: 2018-01-01 | End: 2018-01-01

## 2018-01-01 RX ORDER — PHENOL/SODIUM PHENOLATE
20 AEROSOL, SPRAY (ML) MUCOUS MEMBRANE DAILY
Qty: 30 TAB | Refills: 0 | Status: SHIPPED | OUTPATIENT
Start: 2018-01-01

## 2018-01-01 RX ORDER — DIPHENHYDRAMINE HYDROCHLORIDE 50 MG/ML
INJECTION, SOLUTION INTRAMUSCULAR; INTRAVENOUS AS NEEDED
Status: DISCONTINUED | OUTPATIENT
Start: 2018-01-01 | End: 2018-01-01 | Stop reason: HOSPADM

## 2018-01-01 RX ORDER — LIDOCAINE HYDROCHLORIDE 10 MG/ML
INJECTION, SOLUTION EPIDURAL; INFILTRATION; INTRACAUDAL; PERINEURAL
Status: DISCONTINUED
Start: 2018-01-01 | End: 2018-01-01 | Stop reason: HOSPADM

## 2018-01-01 RX ORDER — MIDAZOLAM HYDROCHLORIDE 1 MG/ML
1 INJECTION, SOLUTION INTRAMUSCULAR; INTRAVENOUS
Status: DISCONTINUED | OUTPATIENT
Start: 2018-01-01 | End: 2018-01-01

## 2018-01-01 RX ADMIN — DIPHENHYDRAMINE HYDROCHLORIDE 25 MG: 50 INJECTION, SOLUTION INTRAMUSCULAR; INTRAVENOUS at 12:40

## 2018-01-01 RX ADMIN — MIDAZOLAM HYDROCHLORIDE 1 MG: 1 INJECTION, SOLUTION INTRAMUSCULAR; INTRAVENOUS at 10:57

## 2018-01-01 RX ADMIN — GADOTERATE MEGLUMINE 15 ML: 376.9 INJECTION INTRAVENOUS at 11:49

## 2018-01-01 RX ADMIN — IOPAMIDOL 75 ML: 612 INJECTION, SOLUTION INTRAVENOUS at 15:07

## 2018-01-01 RX ADMIN — FAMOTIDINE 20 MG: 20 TABLET, FILM COATED ORAL at 11:18

## 2018-01-01 RX ADMIN — FENTANYL CITRATE 50 MCG: 50 INJECTION, SOLUTION INTRAMUSCULAR; INTRAVENOUS at 12:18

## 2018-01-01 RX ADMIN — PROPOFOL 40 MG: 10 INJECTION, EMULSION INTRAVENOUS at 12:30

## 2018-01-01 RX ADMIN — SODIUM CHLORIDE 20 ML/HR: 900 INJECTION, SOLUTION INTRAVENOUS at 09:27

## 2018-01-01 RX ADMIN — LIDOCAINE HYDROCHLORIDE 40 MG: 20 INJECTION, SOLUTION EPIDURAL; INFILTRATION; INTRACAUDAL; PERINEURAL at 12:09

## 2018-01-01 RX ADMIN — FENTANYL CITRATE 50 MCG: 50 INJECTION INTRAMUSCULAR; INTRAVENOUS at 10:57

## 2018-01-01 RX ADMIN — DEXAMETHASONE SODIUM PHOSPHATE 4 MG: 4 INJECTION, SOLUTION INTRA-ARTICULAR; INTRALESIONAL; INTRAMUSCULAR; INTRAVENOUS; SOFT TISSUE at 12:20

## 2018-01-01 RX ADMIN — FENTANYL CITRATE 50 MCG: 50 INJECTION, SOLUTION INTRAMUSCULAR; INTRAVENOUS at 10:57

## 2018-01-01 RX ADMIN — ONDANSETRON 4 MG: 2 INJECTION INTRAMUSCULAR; INTRAVENOUS at 13:51

## 2018-01-01 RX ADMIN — IOPAMIDOL 75 ML: 612 INJECTION, SOLUTION INTRAVENOUS at 15:00

## 2018-01-01 RX ADMIN — FENTANYL CITRATE 50 MCG: 50 INJECTION, SOLUTION INTRAMUSCULAR; INTRAVENOUS at 12:09

## 2018-01-01 RX ADMIN — PROPOFOL 100 MG: 10 INJECTION, EMULSION INTRAVENOUS at 12:09

## 2018-01-01 RX ADMIN — SODIUM CHLORIDE, SODIUM LACTATE, POTASSIUM CHLORIDE, AND CALCIUM CHLORIDE 50 ML/HR: 600; 310; 30; 20 INJECTION, SOLUTION INTRAVENOUS at 11:18

## 2018-01-01 RX ADMIN — FENTANYL CITRATE 50 MCG: 50 INJECTION, SOLUTION INTRAMUSCULAR; INTRAVENOUS at 11:02

## 2018-01-01 RX ADMIN — DEXAMETHASONE SODIUM PHOSPHATE 6 MG: 4 INJECTION, SOLUTION INTRA-ARTICULAR; INTRALESIONAL; INTRAMUSCULAR; INTRAVENOUS; SOFT TISSUE at 12:42

## 2018-01-01 RX ADMIN — SUCCINYLCHOLINE CHLORIDE 100 MG: 20 INJECTION INTRAMUSCULAR; INTRAVENOUS at 12:09

## 2018-01-01 RX ADMIN — MIDAZOLAM HYDROCHLORIDE 1 MG: 1 INJECTION, SOLUTION INTRAMUSCULAR; INTRAVENOUS at 11:02

## 2018-01-08 NOTE — MR AVS SNAPSHOT
Visit Information Date & Time Provider Department Dept. Phone Encounter #  
 1/8/2018  3:30 PM Nury Lawson NP [facility] INTERNAL MEDICINE OF [address] [id number] Your Appointments 2/8/2018  2:15 PM  
Follow Up with Nury Lawson NP  
[facility] INTERNAL MEDICINE OF [place] ([facility]) Appt Note: 1 mo f/u  
 [address] [place], [address] [facility] [address]  
  
   
 [address] [place], [address] [place] [address]  
  
    
 4/25/2018 10:15 AM  
ULTRASOUND with Miky Bullard MD  
[facility]) Appt Note: PVR  
 [address] [place] [address] [address]  
[phone] [address] [place] [address] Upcoming Health Maintenance Date Due DTaP/Tdap/Td series (1 - Tdap) 10/15/1956 ZOSTER VACCINE AGE 60> 8/15/1997 GLAUCOMA SCREENING Q2Y 10/15/2002 Pneumococcal 65+ Low/Medium Risk (2 of 2 - PCV13) 3/29/2017 Influenza Age 5 to Adult 8/1/2017 MEDICARE YEARLY EXAM 10/19/2018 Allergies as of 1/8/2018  Review Complete On: 1/8/2018 By: Mali Oseguera LPN Severity Noted Reaction Type Reactions Latex High 02/16/2017    Hives Tape [Adhesive] High 02/16/2017    Rash Current Immunizations  Reviewed on 10/18/2016 Name Date Influenza High Dose Vaccine PF 10/18/2016 Influenza Vaccine 10/25/2013 Influenza Vaccine PF 10/27/2015 Pneumococcal Polysaccharide (PPSV-23) 3/29/2016 12:55 PM  
  
 Not reviewed this visit You Were Diagnosed With   
  
 Codes Comments Recurrent dry cough    -  Primary ICD-10-CM: K69 ICD-9-CM: 786.2 Indigestion     ICD-10-CM: K30 ICD-9-CM: 536.8 Hoarseness     ICD-10-CM: R49.0 ICD-9-CM: 784.42 Essential hypertension     ICD-10-CM: I10 
ICD-9-CM: 401.9 Vitals  BP Pulse Temp Resp  
 140/72 (BP 1 Location: Left arm, BP Patient Position: Sitting) 78 97.5 °F (36.4 °C) (Tympanic) 18 Height(growth percentile) Weight(growth percentile) BMI Smoking Status 6' (1.829 m) 178 lb (80.7 kg) 24.14 kg/m2 Former Smoker BMI and BSA Data Body Mass Index Body Surface Area  
 24.14 kg/m 2 2.02 m 2 Preferred Pharmacy Pharmacy Name Phone [facility] [place]  [phone] Your Updated Medication List  
  
   
This list is accurate as of: 1/8/18  4:49 PM.  Always use your most recent med list.  
  
  
  
  
 albuterol 90 mcg/actuation inhaler Commonly known as:  PROVENTIL HFA, VENTOLIN HFA, PROAIR HFA Take 2 Puffs by inhalation every six (6) hours as needed for Wheezing. amLODIPine 5 mg tablet Commonly known as:  Deborha Cords TAKE ONE TABLET BY MOUTH ONCE DAILY  
  
 aspirin 81 mg chewable tablet Take 1 Tab by mouth daily. * clopidogrel 75 mg Tab Commonly known as:  PLAVIX TAKE ONE TABLET BY MOUTH ONCE DAILY WITH  DINNER  
  
 * clopidogrel 75 mg Tab Commonly known as:  PLAVIX TAKE ONE TABLET BY MOUTH ONCE DAILY (WITH  DINNER) guaiFENesin-codeine 100-10 mg/5 mL solution Commonly known as:  ROBITUSSIN AC  
1-2 tsp TID as needed for cough. . Do not drive if taking this medication  
  
 inhalational spacing device Commonly known as:  E-Z SPACER  
1 Each by Does Not Apply route as needed. isosorbide mononitrate ER 60 mg CR tablet Commonly known as:  IMDUR  
1 tablet each AM  
  
 levothyroxine 88 mcg tablet Commonly known as:  SYNTHROID  
TAKE ONE TABLET BY MOUTH ONCE DAILY BEFORE BREAKFAST  
  
 lovastatin 20 mg tablet Commonly known as:  MEVACOR Take 1 Tab by mouth daily. MIRALAX PO Take  by mouth as needed. multivitamin with iron tablet Take 1 Tab by mouth daily. Omeprazole delayed release 20 mg tablet Commonly known as:  PRILOSEC D/R Take 1 Tab by mouth daily. oxybutynin 5 mg tablet Commonly known as:  PIOBDVBV  
 Take 1 Tab by mouth three (3) times daily. Indications: Bladder Hyperactivity, URINARY URGENCY * tamsulosin 0.4 mg capsule Commonly known as:  FLOMAX  
1 tablet daily at supper * tamsulosin 0.4 mg capsule Commonly known as:  FLOMAX TAKE ONE CAPSULE BY MOUTH ONCE DAILY * Notice: This list has 4 medication(s) that are the same as other medications prescribed for you. Read the directions carefully, and ask your doctor or other care provider to review them with you. Prescriptions Sent to Pharmacy Refills Omeprazole delayed release (PRILOSEC D/R) 20 mg tablet 0 Sig: Take 1 Tab by mouth daily. Class: Normal  
 Pharmacy: [address] [place] [address] #: [phone] Route: Oral  
  
To-Do List   
 01/08/2018 Imaging:  XR CHEST PA LAT Please provide this summary of care documentation to your next provider. Your primary care clinician is listed as Harshil Pizano. If you have any questions after today's visit, please call [phone].
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
## 2018-01-16 NOTE — MR AVS SNAPSHOT
[address] [facility], [address] [address] 
[phone] Patient: Julio Guardado MRN: [id number] Visit Information Date & Time Provider Department Dept. Phone Encounter #  
 1/16/2018 10:15 AM Radha Motta MD [facility] Toby Lal [id number] Follow-up Instructions Return in about 3 weeks (around 2/6/2018). Your Appointments 2/8/2018  2:15 PM  
Follow Up with Leslye Elizabeth NP  
[facility] [place] ([facility]) Appt Note: 1 mo f/u  
 [address], [address] [facility] [address]  
  
   
 [address], [address] [facility] [address]  
  
    
 4/25/2018 10:15 AM  
ULTRASOUND with Gene Davis MD  
[facility]) Appt Note: PVR  
 [address] [place] [address] [address]  
[phone] [address] [facility] [address] Upcoming Health Maintenance Date Due DTaP/Tdap/Td series (1 - Tdap) 10/15/1956 ZOSTER VACCINE AGE 60> 8/15/1997 GLAUCOMA SCREENING Q2Y 10/15/2002 Pneumococcal 65+ Low/Medium Risk (2 of 2 - PCV13) 3/29/2017 Influenza Age 5 to Adult 8/1/2017 MEDICARE YEARLY EXAM 10/19/2018 Allergies as of 1/16/2018  Review Complete On: 1/16/2018 By: Jorge Luis Cherry LPN Severity Noted Reaction Type Reactions Latex High 02/16/2017    Hives Tape [Adhesive] High 02/16/2017    Rash Current Immunizations  Reviewed on 10/18/2016 Name Date Influenza High Dose Vaccine PF 10/18/2016 Influenza Vaccine 10/25/2013 Influenza Vaccine PF 10/27/2015 Pneumococcal Polysaccharide (PPSV-23) 3/29/2016 12:55 PM  
  
 Not reviewed this visit You Were Diagnosed With   
  
 Codes Comments Lung mass    -  Primary ICD-10-CM: R91.8 ICD-9-CM: 653. 6 Centrilobular emphysema ([address]     ICD-10-CM: J43.2 ICD-9-CM: 492.8 Dyspnea on exertion     ICD-10-CM: R06.09 
ICD-9-CM: 786.09 Vitals BP Pulse Temp Resp Height(growth percentile) Weight(growth percentile) 110/64 (BP 1 Location: Right arm, BP Patient Position: Sitting) 86 97.9 °F (36.6 °C) (Oral) 20 6' (1.829 m) 175 lb (79.4 kg) SpO2 BMI Smoking Status 97% 23.73 kg/m2 Former Smoker BMI and BSA Data Body Mass Index Body Surface Area  
 23.73 kg/m 2 2.01 m 2 Preferred Pharmacy Pharmacy Name Phone [phone] [facility] [phone] Your Updated Medication List  
  
   
This list is accurate as of: 1/16/18 11:38 AM.  Always use your most recent med list.  
  
  
  
  
 albuterol 90 mcg/actuation inhaler Commonly known as:  PROVENTIL HFA, VENTOLIN HFA, PROAIR HFA Take 2 Puffs by inhalation every six (6) hours as needed for Wheezing. amLODIPine 5 mg tablet Commonly known as:  Giaoscar Funes TAKE ONE TABLET BY MOUTH ONCE DAILY  
  
 aspirin 81 mg chewable tablet Take 1 Tab by mouth daily. * clopidogrel 75 mg Tab Commonly known as:  PLAVIX TAKE ONE TABLET BY MOUTH ONCE DAILY WITH  DINNER  
  
 * clopidogrel 75 mg Tab Commonly known as:  PLAVIX TAKE ONE TABLET BY MOUTH ONCE DAILY (WITH  DINNER) guaiFENesin-codeine 100-10 mg/5 mL solution Commonly known as:  ROBITUSSIN AC  
1-2 tsp TID as needed for cough. . Do not drive if taking this medication  
  
 inhalational spacing device Commonly known as:  E-Z SPACER  
1 Each by Does Not Apply route as needed. isosorbide mononitrate ER 60 mg CR tablet Commonly known as:  IMDUR  
1 tablet each AM  
  
 levothyroxine 88 mcg tablet Commonly known as:  SYNTHROID  
TAKE ONE TABLET BY MOUTH ONCE DAILY BEFORE BREAKFAST  
  
 lovastatin 20 mg tablet Commonly known as:  MEVACOR Take 1 Tab by mouth daily. MIRALAX PO Take  by mouth as needed. multivitamin with iron tablet Take 1 Tab by mouth daily. Omeprazole delayed release 20 mg tablet Commonly known as:  PRILOSEC D/R Take 1 Tab by mouth daily. oxybutynin 5 mg tablet Commonly known as:  GPLSTKRP Take 1 Tab by mouth three (3) times daily. Indications: Bladder Hyperactivity, URINARY URGENCY * tamsulosin 0.4 mg capsule Commonly known as:  FLOMAX  
1 tablet daily at supper * tamsulosin 0.4 mg capsule Commonly known as:  FLOMAX TAKE ONE CAPSULE BY MOUTH ONCE DAILY * Notice: This list has 4 medication(s) that are the same as other medications prescribed for you. Read the directions carefully, and ask your doctor or other care provider to review them with you. Follow-up Instructions Return in about 3 weeks (around 2/6/2018). To-Do List   
 01/16/2018 Imaging:  CT CHEST W WO CONT   
  
 01/17/2018 Procedures: AMB POC PFT COMPLETE W/BRONCHODILATOR   
  
 01/17/2018 Procedures: AMB POC PFT COMPLETE W/O BRONCHODILATOR   
  
 01/17/2018 Procedures:  DIFFUSING CAPACITY   
  
 01/17/2018 Procedures:  GAS DILUT/WASHOUT LUNG VOL W/WO DISTRIB VENT&VOL Please provide this summary of care documentation to your next provider. Your primary care clinician is listed as Danielito Cruz. If you have any questions after today's visit, please call [phone].
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
## 2018-01-16 NOTE — LETTER
1/17/2018 4:57 PM 
 
Patient:  Lester Burnett YOB: 1935 Date of Visit: 1/16/2018 Dear Abbe Roy NP 
[address] [facility] [address] [place] [address] VIA In Basket Sarah Senior MD 
[address] [facility] [address] VIA In Basket 
 : Thank you for referring Mr. Michelle Ignacio to me for evaluation/treatment. Below are the relevant portions of my assessment and plan of care. If you have questions, please do not hesitate to call me. I look forward to following Mr. Tejeda along with you. Sincerely, Lakeisha Bryant MD/MPH Pulmonary, Critical Care Medicine [facility]

## 2018-01-16 NOTE — PROGRESS NOTES
Chief Complaint   Patient presents with    New Patient     referred by JAYDA Lynch for lung mass     Patient referred by JAYDA Doll for lung mass evaluation.

## 2018-01-22 NOTE — TELEPHONE ENCOUNTER
Needs 90 day supply. Patient is out of medication. Requested Prescriptions     Pending Prescriptions Disp Refills    lovastatin (MEVACOR) 20 mg tablet 90 Tab 3     Sig: Take 1 Tab by mouth daily.  oxybutynin (DITROPAN) 5 mg tablet 90 Tab 6     Sig: Take 1 Tab by mouth three (3) times daily.  Indications: Bladder Hyperactivity, URINARY URGENCY

## 2018-01-29 NOTE — PROGRESS NOTES
Received letter from Vascular Surgery - Dr. Lencho Dinh --> ok to hold plavix for 5 days before biopsy. Scanned into chart.     Tracie Gomez MD/MPH     Pulmonary, Critical Care Medicine  [facility]

## 2018-02-01 NOTE — TELEPHONE ENCOUNTER
Called and left patient a message, requesting he call office to schedule a follow up appointment with Dr. Bryce Bernard. Patient having CT Lung Biopsy on 02/06/2018. Patient will need a follow up about 2 weeks after procedure.

## 2018-02-02 NOTE — TELEPHONE ENCOUNTER
Called patient about B/L lung masses and CT guided biopsy. I informed him that they will biopsy right side. I will then attempt bronchoscopy that week while off of plavix and get left lung mass. He xpressed understanding and is off of plavix since yesterday.     Arcadio Wilson MD/MPH     Pulmonary, Critical Care Medicine  [facility]

## 2018-02-02 NOTE — PROGRESS NOTES
Placed order to biopsy right lung by IR (CT guided) scheduled for 2/6.   I will plan to biopsy right lung mass the following day via bronchoscopy      Jen Dorado MD/MPH     Pulmonary, Critical Care Medicine  [facility]
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
## 2018-02-06 NOTE — DISCHARGE INSTRUCTIONS
Percutaneous Lung Biopsy: What to Expect at [address] [place] [address]    A percutaneous (say \"per-kew-SUN-nee-us) lung biopsy is a procedure to take a sample (biopsy) of lung tissue. The doctor puts a long needle through your chest wall to get the sample. Another doctor will look at the lung tissue with a microscope to check for infection, cancer, or other lung problems. This procedure is also called a needle biopsy. You may be sore where the doctor made the cut (incision) in your skin and put in the biopsy needle. You may feel some pain in your lung when you take a deep breath. These symptoms usually get better in a few days. If you cough up mucus, there may be streaks of blood in the mucus for the first week after the procedure. You may need to take it easy at home for a day or two after the procedure. For 1 week, try to avoid heavy lifting and strenuous activities. These activities could cause bleeding from the biopsy site. It can take several days to get the results of the biopsy. The doctor or nurse will discuss the results with you. This care sheet gives you a general idea about how long it will take for you to recover. But each person recovers at a different pace. Follow the steps below to feel better as quickly as possible. How can you care for yourself at home? Activity  ? · Rest when you feel tired. Getting enough sleep will help you recover. ? · Try to walk each day. Start by walking a little more than you did the day before. Bit by bit, increase the amount you walk. Walking boosts blood flow and helps prevent pneumonia and constipation. ? · Avoid strenuous activities, such as bicycle riding, jogging, weight lifting, or aerobic exercise, for 1 week or until your doctor says it is okay. ? · For 1 week, avoid lifting anything that would make you strain. This may include a child, heavy grocery bags and milk containers, a heavy briefcase or backpack, cat litter or dog food bags, or a vacuum . ? · Ask your doctor when you can drive again. ? · You may need to take 1 or 2 days off from work. It depends on the type of work you do and how you feel. ? · You may shower 1 or 2 days after the procedure, if your doctor says it is okay. Pat the incision dry. Do not take a bath for the first week, or until your doctor tells you it is okay. ? · Do not fly in an airplane or dive deeply (such as in scuba diving) until your doctor tells you it is okay. Avoid any situations where there is increased air pressure. Diet  ? · You can eat your normal diet. If your stomach is upset, try bland, low-fat foods like plain rice, broiled chicken, toast, and yogurt. Medicines  ? · Your doctor will tell you if and when you can restart your medicines. He or she will also give you instructions about taking any new medicines. ? · If you take blood thinners, such as warfarin (Coumadin), clopidogrel (Plavix), or aspirin, be sure to talk to your doctor. He or she will tell you if and when to start taking those medicines again. Make sure that you understand exactly what your doctor wants you to do. ? · Be safe with medicines. Take pain medicines exactly as directed. ¨ If the doctor gave you a prescription medicine for pain, take it as prescribed. ¨ If you are not taking a prescription pain medicine, ask your doctor if you can take an over-the-counter medicine. ? · If you think your pain medicine is making you sick to your stomach:  ¨ Take your medicine after meals (unless your doctor has told you not to). ¨ Ask your doctor for a different pain medicine. ? · If your doctor prescribed antibiotics, take them as directed. Do not stop taking them just because you feel better. You need to take the full course of antibiotics. Incision care  ? · If you have strips of tape on the incision, leave the tape on for a week or until it falls off.   ? · Wash the area daily with warm, soapy water and pat it dry.  Don't use hydrogen peroxide or alcohol, which can slow healing. You may cover the area with a gauze bandage if it weeps or rubs against clothing. Change the bandage every day. ? · Keep the area clean and dry. Follow-up care is a key part of your treatment and safety. Be sure to make and go to all appointments, and call your doctor if you are having problems. It's also a good idea to know your test results and keep a list of the medicines you take. When should you call for help? Call 911 anytime you think you may need emergency care. For example, call if:  ? · You passed out (lost consciousness). ? · You have severe trouble breathing. ? · You have sudden chest pain and shortness of breath, or you cough up blood. ?Call your doctor now or seek immediate medical care if:  ? · You are sick to your stomach or cannot keep fluids down. ? · You have pain that does not get better after you take pain medicine. ? · You have a fever over 100°F.   ? · You have signs of infection, such as:  ¨ Increased pain, swelling, warmth, or redness. ¨ Red streaks leading from the incision. ¨ Pus draining from the incision. ¨ Swollen lymph nodes in your neck, armpits, or groin. ¨ A fever. ? · Bright red blood has soaked through the bandage over your incision. ? · You cough up a lot more mucus than normal, or the mucus changes color. ? Watch closely for changes in your health, and be sure to contact your doctor if you have any problems. Where can you learn more? Go to http://robert-leno.info/. Enter M096 in the search box to learn more about \"Percutaneous Lung Biopsy: What to Expect at Home. \"  Current as of: May 12, 2017  Content Version: 11.4  © 0909-2091 [employer]. Care instructions adapted under license by [employer] (which disclaims liability or warranty for this information).  If you have questions about a medical condition or this instruction, always ask your healthcare professional. [employer] disclaims any warranty or liability for your use of this information. DISCHARGE SUMMARY from Nurse    PATIENT INSTRUCTIONS:    After general anesthesia or intravenous sedation, for 24 hours or while taking prescription Narcotics:  · Limit your activities  · Do not drive and operate hazardous machinery  · Do not make important personal or business decisions  · Do  not drink alcoholic beverages  · If you have not urinated within 8 hours after discharge, please contact your surgeon on call. Report the following to your surgeon:  · Excessive pain, swelling, redness or odor of or around the surgical area  · Temperature over 100.5  · Nausea and vomiting lasting longer than 4 hours or if unable to take medications  · Any signs of decreased circulation or nerve impairment to extremity: change in color, persistent  numbness, tingling, coldness or increase pain  · Any questions    *  Please give a list of your current medications to your Primary Care Provider. *  Please update this list whenever your medications are discontinued, doses are      changed, or new medications (including over-the-counter products) are added. *  Please carry medication information at all times in case of emergency situations. These are general instructions for a healthy lifestyle:    No smoking/ No tobacco products/ Avoid exposure to second hand smoke  Surgeon General's Warning:  Quitting smoking now greatly reduces serious risk to your health.     Obesity, smoking, and sedentary lifestyle greatly increases your risk for illness    A healthy diet, regular physical exercise & weight monitoring are important for maintaining a healthy lifestyle    You may be retaining fluid if you have a history of heart failure or if you experience any of the following symptoms:  Weight gain of 3 pounds or more overnight or 5 pounds in a week, increased swelling in our hands or feet or shortness of breath while lying flat in bed.  Please call your doctor as soon as you notice any of these symptoms; do not wait until your next office visit. Recognize signs and symptoms of STROKE:    F-face looks uneven    A-arms unable to move or move unevenly    S-speech slurred or non-existent    T-time-call 911 as soon as signs and symptoms begin-DO NOT go       Back to bed or wait to see if you get better-TIME IS BRAIN. Warning Signs of HEART ATTACK     Call 911 if you have these symptoms:   Chest discomfort. Most heart attacks involve discomfort in the center of the chest that lasts more than a few minutes, or that goes away and comes back. It can feel like uncomfortable pressure, squeezing, fullness, or pain.  Discomfort in other areas of the upper body. Symptoms can include pain or discomfort in one or both arms, the back, neck, jaw, or stomach.  Shortness of breath with or without chest discomfort.  Other signs may include breaking out in a cold sweat, nausea, or lightheadedness. Don't wait more than five minutes to call 911 - MINUTES MATTER! Fast action can save your life. Calling 911 is almost always the fastest way to get lifesaving treatment. Emergency Medical Services staff can begin treatment when they arrive -- up to an hour sooner than if someone gets to the hospital by car. The discharge information has been reviewed with the patient and spouse. The patient and spouse verbalized understanding. Discharge medications reviewed with the patient and spouse and appropriate educational materials and side effects teaching were provided.   ___________________________________________________________________________________________________________________________________

## 2018-02-06 NOTE — PROGRESS NOTES
TRANSFER - OUT REPORT:    Verbal report given to Morenita Luo RN(name) on Josue  being transferred to Phase 2 after Xray(unit) for routine post - op       Report consisted of patients Situation, Background, Assessment and   Recommendations(SBAR). Information from the following report(s) SBAR, Kardex and MAR was reviewed with the receiving nurse. Lines:   PICC Double Lumen 38/79/21 Right;Basilic (Active)       Peripheral IV 02/06/18 Left Antecubital (Active)   Site Assessment Clean, dry, & intact 2/6/2018  9:25 AM   Phlebitis Assessment 0 2/6/2018  9:25 AM   Infiltration Assessment 0 2/6/2018  9:25 AM   Dressing Status Clean, dry, & intact 2/6/2018  9:25 AM   Dressing Type Tape;Transparent 2/6/2018  9:25 AM   Hub Color/Line Status Pink; Infusing 2/6/2018  9:25 AM        Opportunity for questions and clarification was provided.       Patient transported with:   [place]

## 2018-02-06 NOTE — IP AVS SNAPSHOT
Radha Napoles 
 
 
 [address] Patient: Radha Jimenez MRN: [id number] About your hospitalization You were admitted on:  February 6, 2018 You last received care in the:  [facility] You were discharged on:  February 6, 2018 Why you were hospitalized Your primary diagnosis was:  Not on File Follow-up Information Follow up With Details Comments Contact Info Tila Martin MD   [address] [place] [address] 
[phone] Shruti Lyman MD Follow up in 1 week(s)  [address] [facility] [address] [address] 
[phone] Your Scheduled Appointments Thursday February 08, 2018 ENDOSCOPIC BRONCHOSCOPY ULTRASOUND (EBUS) with Shruti Lyman MD  
[facility] ENDOSCOPY [facility]) [address] [facility] [address] Thursday February 08, 2018  2:15 PM EST Follow Up with Salomón Colindres NP  
[facility] OF [place] ([facility]) [address] [facility], [address] [facility] [address]  
[phone] Friday February 16, 2018 12:00 PM EST Follow Up with Shruti Lyman MD  
[address] ([facility]) [address] [facility], [address] [address]  
[phone] Discharge Orders None A check jose roberto indicates which time of day the medication should be taken. My Medications ASK your doctor about these medications Instructions Each Dose to Equal  
 Morning Noon Evening Bedtime  
 albuterol 90 mcg/actuation inhaler Commonly known as:  PROVENTIL HFA, VENTOLIN HFA, PROAIR HFA Your last dose was: Your next dose is: Take 2 Puffs by inhalation every six (6) hours as needed for Wheezing. 2 Puff  
    
   
   
   
  
 amLODIPine 5 mg tablet Commonly known as:  Ta Pink Your last dose was: Your next dose is: TAKE ONE TABLET BY MOUTH ONCE DAILY  
     
   
   
   
  
 aspirin 81 mg chewable tablet Your last dose was: Your next dose is: Take 1 Tab by mouth daily. 81 mg  
    
   
   
   
  
 * clopidogrel 75 mg Tab Commonly known as:  PLAVIX Your last dose was: Your next dose is: TAKE ONE TABLET BY MOUTH ONCE DAILY WITH  DINNER  
     
   
   
   
  
 * clopidogrel 75 mg Tab Commonly known as:  PLAVIX Your last dose was: Your next dose is: TAKE ONE TABLET BY MOUTH ONCE DAILY (WITH  DINNER) guaiFENesin-codeine 100-10 mg/5 mL solution Commonly known as:  ROBITUSSIN AC Your last dose was: Your next dose is:    
   
   
 1-2 tsp TID as needed for cough. . Do not drive if taking this medication  
     
   
   
   
  
 inhalational spacing device Commonly known as:  E-Z SPACER Your last dose was: Your next dose is:    
   
   
 1 Each by Does Not Apply route as needed. 1 Each  
    
   
   
   
  
 isosorbide mononitrate ER 60 mg CR tablet Commonly known as:  IMDUR Your last dose was: Your next dose is:    
   
   
 1 tablet each AM  
     
   
   
   
  
 levothyroxine 88 mcg tablet Commonly known as:  SYNTHROID Your last dose was: Your next dose is: TAKE ONE TABLET BY MOUTH ONCE DAILY BEFORE BREAKFAST  
     
   
   
   
  
 lovastatin 20 mg tablet Commonly known as:  MEVACOR Your last dose was: Your next dose is: Take 1 Tab by mouth daily. 20 mg MIRALAX PO Your last dose was: Your next dose is: Take  by mouth as needed. multivitamin with iron tablet Your last dose was: Your next dose is: Take 1 Tab by mouth daily. 1 Tab Omeprazole delayed release 20 mg tablet Commonly known as:  PRILOSEC D/R Your last dose was: Your next dose is: Take 1 Tab by mouth daily. 20 mg  
    
   
   
   
  
 oxybutynin 5 mg tablet Commonly known as:  FOMXVAAC Your last dose was: Your next dose is: Take 1 Tab by mouth three (3) times daily. Indications: Bladder Hyperactivity, URINARY URGENCY  
 5 mg * tamsulosin 0.4 mg capsule Commonly known as:  FLOMAX Your last dose was: Your next dose is:    
   
   
 1 tablet daily at supper * tamsulosin 0.4 mg capsule Commonly known as:  FLOMAX Your last dose was: Your next dose is: TAKE ONE CAPSULE BY MOUTH ONCE DAILY  
     
   
   
   
  
 TYLENOL EXTRA STRENGTH PO Your last dose was: Your next dose is: Take  by mouth as needed. * Notice: This list has 4 medication(s) that are the same as other medications prescribed for you. Read the directions carefully, and ask your doctor or other care provider to review them with you. Discharge Instructions Percutaneous Lung Biopsy: What to Expect at [facility] Your Recovery A percutaneous (say \"per-jaimie-SUN-henry-us) lung biopsy is a procedure to take a sample (biopsy) of lung tissue. The doctor puts a long needle through your chest wall to get the sample. Another doctor will look at the lung tissue with a microscope to check for infection, cancer, or other lung problems. This procedure is also called a needle biopsy. You may be sore where the doctor made the cut (incision) in your skin and put in the biopsy needle. You may feel some pain in your lung when you take a deep breath. These symptoms usually get better in a few days. If you cough up mucus, there may be streaks of blood in the mucus for the first week after the procedure. You may need to take it easy at home for a day or two after the procedure. For 1 week, try to avoid heavy lifting and strenuous activities. These activities could cause bleeding from the biopsy site. It can take several days to get the results of the biopsy. The doctor or nurse will discuss the results with you. This care sheet gives you a general idea about how long it will take for you to recover. But each person recovers at a different pace. Follow the steps below to feel better as quickly as possible. How can you care for yourself at home? Activity ? · Rest when you feel tired. Getting enough sleep will help you recover. ? · Try to walk each day. Start by walking a little more than you did the day before. Bit by bit, increase the amount you walk. Walking boosts blood flow and helps prevent pneumonia and constipation. ? · Avoid strenuous activities, such as bicycle riding, jogging, weight lifting, or aerobic exercise, for 1 week or until your doctor says it is okay. ? · For 1 week, avoid lifting anything that would make you strain. This may include a child, heavy grocery bags and milk containers, a heavy briefcase or backpack, cat litter or dog food bags, or a vacuum . ? · Ask your doctor when you can drive again. ? · You may need to take 1 or 2 days off from work. It depends on the type of work you do and how you feel. ? · You may shower 1 or 2 days after the procedure, if your doctor says it is okay. Pat the incision dry. Do not take a bath for the first week, or until your doctor tells you it is okay. ? · Do not fly in an airplane or dive deeply (such as in scuba diving) until your doctor tells you it is okay. Avoid any situations where there is increased air pressure. Diet ? · You can eat your normal diet. If your stomach is upset, try bland, low-fat foods like plain rice, broiled chicken, toast, and yogurt. Medicines ? · Your doctor will tell you if and when you can restart your medicines. He or she will also give you instructions about taking any new medicines. ? · If you take blood thinners, such as warfarin (Coumadin), clopidogrel (Plavix), or aspirin, be sure to talk to your doctor. He or she will tell you if and when to start taking those medicines again. Make sure that you understand exactly what your doctor wants you to do. ? · Be safe with medicines. Take pain medicines exactly as directed. ¨ If the doctor gave you a prescription medicine for pain, take it as prescribed. ¨ If you are not taking a prescription pain medicine, ask your doctor if you can take an over-the-counter medicine. ? · If you think your pain medicine is making you sick to your stomach: 
¨ Take your medicine after meals (unless your doctor has told you not to). ¨ Ask your doctor for a different pain medicine. ? · If your doctor prescribed antibiotics, take them as directed. Do not stop taking them just because you feel better. You need to take the full course of antibiotics. Incision care ? · If you have strips of tape on the incision, leave the tape on for a week or until it falls off.  
? · Wash the area daily with warm, soapy water and pat it dry. Don't use hydrogen peroxide or alcohol, which can slow healing. You may cover the area with a gauze bandage if it weeps or rubs against clothing. Change the bandage every day. ? · Keep the area clean and dry. Follow-up care is a key part of your treatment and safety. Be sure to make and go to all appointments, and call your doctor if you are having problems. It's also a good idea to know your test results and keep a list of the medicines you take. When should you call for help? Call 911 anytime you think you may need emergency care. For example, call if: 
? · You passed out (lost consciousness). ? · You have severe trouble breathing. ? · You have sudden chest pain and shortness of breath, or you cough up blood. ?Call your doctor now or seek immediate medical care if: 
? · You are sick to your stomach or cannot keep fluids down. ? · You have pain that does not get better after you take pain medicine. ? · You have a fever over 100°F.  
? · You have signs of infection, such as: 
¨ Increased pain, swelling, warmth, or redness. ¨ Red streaks leading from the incision. ¨ Pus draining from the incision. ¨ Swollen lymph nodes in your neck, armpits, or groin. ¨ A fever. ? · Bright red blood has soaked through the bandage over your incision. ? · You cough up a lot more mucus than normal, or the mucus changes color. ? Watch closely for changes in your health, and be sure to contact your doctor if you have any problems. Where can you learn more? Go to http://robert-leno.info/. Enter L877 in the search box to learn more about \"Percutaneous Lung Biopsy: What to Expect at Home. \" Current as of: May 12, 2017 Content Version: 11.4 © 2945-7044 [employer]. Care instructions adapted under license by [employer] (which disclaims liability or warranty for this information). If you have questions about a medical condition or this instruction, always ask your healthcare professional. [place] [address] any warranty or liability for your use of this information. DISCHARGE SUMMARY from Nurse PATIENT INSTRUCTIONS: 
 
 
F-face looks uneven A-arms unable to move or move unevenly S-speech slurred or non-existent T-time-call 911 as soon as signs and symptoms begin-DO NOT go Back to bed or wait to see if you get better-TIME IS BRAIN. Warning Signs of HEART ATTACK Call 911 if you have these symptoms: 
? Chest discomfort.  Most heart attacks involve discomfort in the center of the chest that lasts more than a few minutes, or that goes away and comes back. It can feel like uncomfortable pressure, squeezing, fullness, or pain. ? Discomfort in other areas of the upper body. Symptoms can include pain or discomfort in one or both arms, the back, neck, jaw, or stomach. ? Shortness of breath with or without chest discomfort. ? Other signs may include breaking out in a cold sweat, nausea, or lightheadedness. Don't wait more than five minutes to call [address]! Fast action can save your life. Calling 911 is almost always the fastest way to get lifesaving treatment. Emergency Medical Services staff can begin treatment when they arrive  up to an hour sooner than if someone gets to the hospital by car. The discharge information has been reviewed with the patient and spouse. The patient and spouse verbalized understanding. Discharge medications reviewed with the patient and spouse and appropriate educational materials and side effects teaching were provided. ___________________________________________________________________________________________________________________________________ ACO Transitions of Care Introducing [employer] [facility] offers a voluntary care coordination program to provide high quality service and care to [facility] fee-for-service beneficiaries. Daksha Snowball was designed to help you enhance your health and well-being through the following services: ? Transitions of Care  support for individuals who are transitioning from one care setting to another (example: Hospital to home). ? Chronic and Complex Care Coordination  support for individuals and caregivers of those with serious or chronic illnesses or with more than one chronic (ongoing) condition and those who take a number of different medications.   
 
 
If you meet specific medical criteria, a [facility] Coordinator may call you directly to coordinate your care with your primary care physician and your other care providers. For questions about the [facility] programs, please, contact your physicians office. For general questions or additional information about Accountable Care Organizations: 
Please visit www.medicare.gov/acos. html or call 1-800-MEDICARE (7-540.846.4638) TTY users should call 4-942.275.6931. Introducing \[facility] [place] [facility]\"" & HEALTH SERVICES! Dear Addison Fuller: Thank you for requesting a [employer] account. Our records indicate that you have previously registered for a [employer] account but its currently inactive. Please call our [employer] support line at [phone]. Additional Information If you have questions, please visit the Frequently Asked Questions section of the [employer] website at [email or web address] Remember, [employer] is NOT to be used for urgent needs. For medical emergencies, dial 911. Now available from your iPhone and Android! Unresulted Labs-Please follow up with your PCP about these lab tests Order Current Status CT BX LUNG NDL PERC In process Providers Seen During Your Hospitalization Provider Specialty Primary office phone Maryfrances Cooks, MD Radiology [phone] Your Primary Care Physician (PCP) Primary Care Physician Office Phone Office Fax [address], [address] [place] [address] [phone] You are allergic to the following Allergen Reactions Latex Hives Tape (Adhesive) Rash Recent Documentation Height Weight BMI Smoking Status 1.829 m 77.7 kg 23.24 kg/m2 Former Smoker Emergency Contacts Name Discharge Info Relation Home Work [facility] - 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 Signatures-by signing, you are acknowledging that this After Visit Summary has been reviewed with you and you have received a copy. Patient Signature:  ____________________________________________________________ Date:  ____________________________________________________________  
  
Lynn Micheline Provider Signature:  ____________________________________________________________ Date:  ____________________________________________________________

## 2018-02-06 NOTE — PROCEDURES
INTERVENTIONAL RADIOLOGY POST PROCEDURE NOTE    Procedure: CT-Guided Lung Biopsy     : Godfrey Vences MD    Assistant: None    Indication: Lesion    Pre-operative Diagnosis: Lesion    Post-operative Diagnosis: Same    Anesthesia: 1% lidocaine and IV moderate sedation with Versed and Fentanyl administered and monitored by qualified nursing staff. Findings:  - Written and verbal informed consent was obtained. - Time Out was performed. - Permanent image storage performed on PACS. - Image-guided biopsy performed using maximum barrier precautions and sterile technique. - Please refer to report on PACS for full details. Specimen: As per orders    Complications: Trace pneumothorax     Impression: Successful CT-guided biopsy of lung lesion with 12 passes to allow sufficient material for oncologic analysis.     Signed By: Bayron Rodriguez MD     February 6, 2018

## 2018-02-08 NOTE — DISCHARGE INSTRUCTIONS
Resume Plavix tomorrow. Bronchoscopy: What to Expect at [address] [facility]    Bronchoscopy lets your doctor look at your airway through a tube called a bronchoscope. Afterward, you may feel tired for 1 or 2 days. Your mouth may feel very dry for several hours after the procedure. You may also have a sore throat and a hoarse voice for a few days. Sucking on throat lozenges or gargling with warm salt water may help soothe your sore throat. If a sample of tissue (biopsy) was taken, you may spit up a small amount of blood or have bloody saliva. This is normal.  This care sheet gives you a general idea about how long it will take for you to recover. But each person recovers at a different pace. Follow the steps below to get better as quickly as possible. How can you care for yourself at home? Activity  ? ? · Rest when you feel tired. Getting enough sleep will help you recover. ? · Avoid strenuous activities, such as bicycle riding, jogging, weight lifting, or aerobic exercise, until your doctor says it is okay. ? · Ask your doctor when you can drive again. Diet  ? · You can eat your normal diet. If your stomach is upset, try bland, low-fat foods like plain rice, broiled chicken, toast, and yogurt. ? · If it is painful to swallow, start out with cold drinks, flavored ice pops, and ice cream. Next, try soft foods like pudding, yogurt, canned or cooked fruit, scrambled eggs, and mashed potatoes. Avoid eating hard or scratchy foods like chips or raw vegetables. Avoid orange or tomato juice and other acidic foods that can sting the throat. ? · Drink plenty of fluids to avoid becoming dehydrated (unless your doctor tells you not to). Medicines  ? · Take pain medicines exactly as directed. ¨ If the doctor gave you a prescription medicine for pain, take it as prescribed. ¨ If you are not taking a prescription pain medicine, ask your doctor if you can take an over-the-counter medicine.    ? · If you think your pain medicine is making you sick to your stomach:  ¨ Take your medicine after meals (unless your doctor has told you not to). ¨ Ask your doctor for a different pain medicine. ? · If your doctor prescribed antibiotics, take them as directed. Do not stop taking them just because you feel better. You need to take the full course of antibiotics. Follow-up care is a key part of your treatment and safety. Be sure to make and go to all appointments, and call your doctor if you are having problems. It's also a good idea to know your test results and keep a list of the medicines you take. When should you call for help? Call 911 anytime you think you may need emergency care. For example, call if:  ? · You passed out (lost consciousness). ? · You have sudden chest pain and shortness of breath. ? · You cough up large amounts of bright red blood. ? · You have severe pain in your chest.   ? · You have severe trouble breathing. ?Call your doctor now or seek immediate medical care if:  ? · You cough up more than a few tablespoons of blood. ? · You have pain that does not get better after you take pain medicine. ? · You have a fever over 100°F.   ? · You still sound hoarse after a few days. ? · You have bubbles under the skin around the collarbone. These may crackle and pop when you press on them. ? Watch closely for changes in your health, and be sure to contact your doctor if you have any problems. Where can you learn more? Go to http://robert-leno.info/. Enter H298 in the search box to learn more about \"Bronchoscopy: What to Expect at Home. \"  Current as of: May 12, 2017  Content Version: 11.4  © 2421-2528 [employer]. Care instructions adapted under license by [employer] (which disclaims liability or warranty for this information).  If you have questions about a medical condition or this instruction, always ask your healthcare professional. [employer] disclaims any warranty or liability for your use of this information. DISCHARGE SUMMARY from Nurse    PATIENT INSTRUCTIONS:    After general anesthesia or intravenous sedation, for 24 hours or while taking prescription Narcotics:  · Limit your activities  · Do not drive and operate hazardous machinery  · Do not make important personal or business decisions  · Do  not drink alcoholic beverages  · If you have not urinated within 8 hours after discharge, please contact your surgeon on call. Report the following to your surgeon:  · Excessive pain, swelling, redness or odor of or around the surgical area  · Temperature over 100.5  · Nausea and vomiting lasting longer than 4 hours or if unable to take medications  · Any signs of decreased circulation or nerve impairment to extremity: change in color, persistent  numbness, tingling, coldness or increase pain  · Any questions    *  Please give a list of your current medications to your Primary Care Provider. *  Please update this list whenever your medications are discontinued, doses are      changed, or new medications (including over-the-counter products) are added. *  Please carry medication information at all times in case of emergency situations. These are general instructions for a healthy lifestyle:    No smoking/ No tobacco products/ Avoid exposure to second hand smoke  Surgeon General's Warning:  Quitting smoking now greatly reduces serious risk to your health. Obesity, smoking, and sedentary lifestyle greatly increases your risk for illness    A healthy diet, regular physical exercise & weight monitoring are important for maintaining a healthy lifestyle    You may be retaining fluid if you have a history of heart failure or if you experience any of the following symptoms:  Weight gain of 3 pounds or more overnight or 5 pounds in a week, increased swelling in our hands or feet or shortness of breath while lying flat in bed.   Please call your doctor as soon as you notice any of these symptoms; do not wait until your next office visit. Recognize signs and symptoms of STROKE:    F-face looks uneven    A-arms unable to move or move unevenly    S-speech slurred or non-existent    T-time-call 911 as soon as signs and symptoms begin-DO NOT go       Back to bed or wait to see if you get better-TIME IS BRAIN. Warning Signs of HEART ATTACK     Call 911 if you have these symptoms:   Chest discomfort. Most heart attacks involve discomfort in the center of the chest that lasts more than a few minutes, or that goes away and comes back. It can feel like uncomfortable pressure, squeezing, fullness, or pain.  Discomfort in other areas of the upper body. Symptoms can include pain or discomfort in one or both arms, the back, neck, jaw, or stomach.  Shortness of breath with or without chest discomfort.  Other signs may include breaking out in a cold sweat, nausea, or lightheadedness. Don't wait more than five minutes to call 911 - MINUTES MATTER! Fast action can save your life. Calling 911 is almost always the fastest way to get lifesaving treatment. Emergency Medical Services staff can begin treatment when they arrive -- up to an hour sooner than if someone gets to the hospital by car. The discharge information has been reviewed with the patient and spouse. The patient and spouse verbalized understanding. Discharge medications reviewed with the patient and spouse and appropriate educational materials and side effects teaching were provided.   ___________________________________________________________________________________________________________________________________

## 2018-02-08 NOTE — H&P
H&P Update:  Ron Mejía was seen and examined. History and physical has been reviewed. The patient has been examined.  There have been no significant clinical changes since the completion of the originally dated History and Physical.  Pt appropriate for bronchoscopy with biopsies and EBUS/TBNA    Signed By: Chelsea Ardon MD     February 8, 2018 11:38 AM

## 2018-02-08 NOTE — PROCEDURES
[address] [facility] [address] [facility]  Pulmonary, Critical Care, and Sleep Medicine          Name: Francisco Mei MRN: [id number]   : 1935 Hospital: [address] [place] [address]   Date: 2018        Bronchoscopy with EBUS/TBNA Report    Procedure: Diagnostic bronchoscopy. Indication: Abnormal chest imaging - B/L lung masses, mediastinal/hilar lymphadenopathy    Consent/Treatment: Informed consent was obtained from the  patient after risks, benefits and alternatives were explained. Timeout verified the correct patient and correct procedure. Anesthesia:   General anesthesia was performed by anesthesiology    Procedure Details:   -- The bronchoscope was introduced through an endotracheal tube. -- The trachea and kristie were completely inspected and were found to be normal.  -- The right-sided endobronchial anatomy was completely inspected and was found to be normal.  -- The left-sided endobronchial anatomy was inspected - showed endobronchial cobblestoning from suspected tumor along superior takeoff of left mainstem bronchus (see images below). No lesions were seen in the ANNABELLE, lingula, or LLL  -- No endobronchial lesions were encountered otherwise  -- The flexible bronchoscope was removed and the endobronchial ultrasound scope was inserted into the endotracheal tube  -- The mediastinal and hilar lymph nodes were inspected showing lymphadenopathy at the following sites: stations 4L, 7, and 10L  -- Transbronchial needle aspiration was then performed using ultrasound guidance to the following lymph nodes/sites: 4L - 5 passes were made (onsite pathologist noted lesional cells so no other LN were sampled)  -- Following transbronchial needle aspiration, the endobronchial ultrasound scope was removed and the flexible bronchoscope was then reinserted. -- The airways were reinspected and showed no active bleeding.   Bloody secretions were aspirated as completely as possible  -- The bronchoscope was wedged into the RUL and transbronchial biopsy was performed using C-arm with 5 passes  -- This was followed by cytology brush in the RUL  -- Then endobronchial biopsies were performed at the endobronchial lesion at the left mainstem  -- This was followed by attempted Daniel needle FNA of the left mainstem lesion, however due to difficult angle, only 2 passes were made  -- Bronchial washings were collected for cytology  -- BAL was performed at RUL  -- The flexible bronchoscope was then removed.   -- CXR was ordered post procedure which did not show pneumothorax    --Images from the procedure are below and scanned into the patient's chart    Specimens:   Bronchial washings were sent for  cytology  The bronchoscope was wedged in the RUL and bronchoalveolar lavage was performed; material was sent for  cytology  Endobronchial biopsies from the left mainstem endobronchial lesion were sent for surgical pathology  Cytology brushings from RUL were sent  Transbronchial biopsy from RUL anterior segment was performed using fluoroscopic guidance and sent for  surgical pathology  Transbronchial needle aspiration from left mainstem lesion was sent for  cytology    Rapid On-Site Evaluation: A preliminary diagnosis of squamous cell carcinoma    Complications: none    Estimated Blood Loss: Minimal                            Shyann Poon MD/MPH     Pulmonary, [address] [facility]

## 2018-02-08 NOTE — PERIOP NOTES
[address] [place] [address] Per Dr. Grazyna Butler, he will need EBUS balloon attached to scope, due to inability to visualize site properly. Patient has a latex allergy noted and Dr Elton Delgado is aware and will proceeded after pre medications are given IV . He has been pre medicated by CRNA with 25 mg of Benadryl and 10 mg of decadron.

## 2018-02-08 NOTE — IP AVS SNAPSHOT
[address] [place] [address] 
[phone] Patient: Shelli Li MRN: [id number] UCL:[birth year] About your hospitalization You were admitted on:  February 8, 2018 You last received care in the:  [facility] PACU You were discharged on:  February 8, 2018 Why you were hospitalized Your primary diagnosis was:  Not on File Follow-up Information Follow up With Details Comments Contact Info Thao Breaux MD   [address] [facility] [address] 
[phone] Luanne Ruff MD  Follow up as scheduled. [address] [facility] [address] [facility] [address] 
[phone] Your Scheduled Appointments Friday February 16, 2018 12:00 PM EST Follow Up with Luanne Ruff MD  
[address] ([facility]) 235 [facility], [address] [place] [address]  
[phone] Discharge Orders None A check jose roberto indicates which time of day the medication should be taken. My Medications ASK your doctor about these medications Instructions Each Dose to Equal  
 Morning Noon Evening Bedtime  
 albuterol 90 mcg/actuation inhaler Commonly known as:  PROVENTIL HFA, VENTOLIN HFA, PROAIR HFA Your last dose was: Your next dose is: Take 2 Puffs by inhalation every six (6) hours as needed for Wheezing. 2 Puff  
    
   
   
   
  
 amLODIPine 5 mg tablet Commonly known as:  Ama Jackman Your last dose was: Your next dose is: TAKE ONE TABLET BY MOUTH ONCE DAILY  
     
   
   
   
  
 aspirin 81 mg chewable tablet Your last dose was: Your next dose is: Take 1 Tab by mouth daily. 81 mg  
    
   
   
   
  
 * clopidogrel 75 mg Tab Commonly known as:  PLAVIX Your last dose was: Your next dose is:  TAKE ONE TABLET BY MOUTH ONCE DAILY WITH  DINNER  
     
   
   
   
  
 * clopidogrel 75 mg Tab Commonly known as:  PLAVIX Your last dose was: Your next dose is: TAKE ONE TABLET BY MOUTH ONCE DAILY (WITH  DINNER) guaiFENesin-codeine 100-10 mg/5 mL solution Commonly known as:  ROBITUSSIN AC Your last dose was: Your next dose is:    
   
   
 1-2 tsp TID as needed for cough. . Do not drive if taking this medication  
     
   
   
   
  
 inhalational spacing device Commonly known as:  E-Z SPACER Your last dose was: Your next dose is:    
   
   
 1 Each by Does Not Apply route as needed. 1 Each  
    
   
   
   
  
 isosorbide mononitrate ER 60 mg CR tablet Commonly known as:  IMDUR Your last dose was: Your next dose is:    
   
   
 1 tablet each AM  
     
   
   
   
  
 levothyroxine 88 mcg tablet Commonly known as:  SYNTHROID Your last dose was: Your next dose is: TAKE ONE TABLET BY MOUTH ONCE DAILY BEFORE BREAKFAST  
     
   
   
   
  
 lovastatin 20 mg tablet Commonly known as:  MEVACOR Your last dose was: Your next dose is: Take 1 Tab by mouth daily. 20 mg MIRALAX PO Your last dose was: Your next dose is: Take  by mouth as needed. multivitamin with iron tablet Your last dose was: Your next dose is: Take 1 Tab by mouth daily. 1 Tab Omeprazole delayed release 20 mg tablet Commonly known as:  PRILOSEC D/R Your last dose was: Your next dose is: Take 1 Tab by mouth daily. 20 mg  
    
   
   
   
  
 oxybutynin 5 mg tablet Commonly known as:  ZRBWDTHO Your last dose was: Your next dose is: Take 1 Tab by mouth three (3) times daily. Indications: Bladder Hyperactivity, URINARY URGENCY  
 5 mg * tamsulosin 0.4 mg capsule Commonly known as:  FLOMAX Your last dose was: Your next dose is:    
   
   
 1 tablet daily at supper * tamsulosin 0.4 mg capsule Commonly known as:  FLOMAX Your last dose was: Your next dose is: TAKE ONE CAPSULE BY MOUTH ONCE DAILY  
     
   
   
   
  
 TYLENOL EXTRA STRENGTH PO Your last dose was: Your next dose is: Take  by mouth as needed. * Notice: This list has 4 medication(s) that are the same as other medications prescribed for you. Read the directions carefully, and ask your doctor or other care provider to review them with you. Discharge Instructions Resume Plavix tomorrow. Bronchoscopy: What to Expect at [facility] Your Recovery Bronchoscopy lets your doctor look at your airway through a tube called a bronchoscope. Afterward, you may feel tired for 1 or 2 days. Your mouth may feel very dry for several hours after the procedure. You may also have a sore throat and a hoarse voice for a few days. Sucking on throat lozenges or gargling with warm salt water may help soothe your sore throat. If a sample of tissue (biopsy) was taken, you may spit up a small amount of blood or have bloody saliva. This is normal. 
This care sheet gives you a general idea about how long it will take for you to recover. But each person recovers at a different pace. Follow the steps below to get better as quickly as possible. How can you care for yourself at home? Activity ? ? · Rest when you feel tired. Getting enough sleep will help you recover. ? · Avoid strenuous activities, such as bicycle riding, jogging, weight lifting, or aerobic exercise, until your doctor says it is okay. ? · Ask your doctor when you can drive again. Diet ? · You can eat your normal diet.  If your stomach is upset, try bland, low-fat foods like plain rice, broiled chicken, toast, and yogurt. ? · If it is painful to swallow, start out with cold drinks, flavored ice pops, and ice cream. Next, try soft foods like pudding, yogurt, canned or cooked fruit, scrambled eggs, and mashed potatoes. Avoid eating hard or scratchy foods like chips or raw vegetables. Avoid orange or tomato juice and other acidic foods that can sting the throat. ? · Drink plenty of fluids to avoid becoming dehydrated (unless your doctor tells you not to). Medicines ? · Take pain medicines exactly as directed. ¨ If the doctor gave you a prescription medicine for pain, take it as prescribed. ¨ If you are not taking a prescription pain medicine, ask your doctor if you can take an over-the-counter medicine. ? · If you think your pain medicine is making you sick to your stomach: 
¨ Take your medicine after meals (unless your doctor has told you not to). ¨ Ask your doctor for a different pain medicine. ? · If your doctor prescribed antibiotics, take them as directed. Do not stop taking them just because you feel better. You need to take the full course of antibiotics. Follow-up care is a key part of your treatment and safety. Be sure to make and go to all appointments, and call your doctor if you are having problems. It's also a good idea to know your test results and keep a list of the medicines you take. When should you call for help? Call 911 anytime you think you may need emergency care. For example, call if: 
? · You passed out (lost consciousness). ? · You have sudden chest pain and shortness of breath. ? · You cough up large amounts of bright red blood. ? · You have severe pain in your chest.  
? · You have severe trouble breathing. ?Call your doctor now or seek immediate medical care if: 
? · You cough up more than a few tablespoons of blood. ? · You have pain that does not get better after you take pain medicine. ? · You have a fever over 100°F.  
? · You still sound hoarse after a few days. ? · You have bubbles under the skin around the collarbone. These may crackle and pop when you press on them. ? Watch closely for changes in your health, and be sure to contact your doctor if you have any problems. Where can you learn more? Go to http://robert-leno.info/. Enter M007 in the search box to learn more about \"Bronchoscopy: What to Expect at Home. \" Current as of: May 12, 2017 Content Version: 11.4 © 0360-3200 [employer]. Care instructions adapted under license by [employer] (which disclaims liability or warranty for this information). If you have questions about a medical condition or this instruction, always ask your healthcare professional. [address] any warranty or liability for your use of this information. DISCHARGE SUMMARY from Nurse PATIENT INSTRUCTIONS: 
 
After general anesthesia or intravenous sedation, for 24 hours or while taking prescription Narcotics: · Limit your activities · Do not drive and operate hazardous machinery · Do not make important personal or business decisions · Do  not drink alcoholic beverages · If you have not urinated within 8 hours after discharge, please contact your surgeon on call. Report the following to your surgeon: 
· Excessive pain, swelling, redness or odor of or around the surgical area · Temperature over 100.5 · Nausea and vomiting lasting longer than 4 hours or if unable to take medications · Any signs of decreased circulation or nerve impairment to extremity: change in color, persistent  numbness, tingling, coldness or increase pain · Any questions *  Please give a list of your current medications to your Primary Care Provider.  
 
*  Please update this list whenever your medications are discontinued, doses are 
 changed, or new medications (including over-the-counter products) are added. *  Please carry medication information at all times in case of emergency situations. These are general instructions for a healthy lifestyle: No smoking/ No tobacco products/ Avoid exposure to second hand smoke Surgeon General's Warning:  Quitting smoking now greatly reduces serious risk to your health. Obesity, smoking, and sedentary lifestyle greatly increases your risk for illness A healthy diet, regular physical exercise & weight monitoring are important for maintaining a healthy lifestyle You may be retaining fluid if you have a history of heart failure or if you experience any of the following symptoms:  Weight gain of 3 pounds or more overnight or 5 pounds in a week, increased swelling in our hands or feet or shortness of breath while lying flat in bed. Please call your doctor as soon as you notice any of these symptoms; do not wait until your next office visit. Recognize signs and symptoms of STROKE: 
 
F-face looks uneven A-arms unable to move or move unevenly S-speech slurred or non-existent T-time-call 911 as soon as signs and symptoms begin-DO NOT go Back to bed or wait to see if you get better-TIME IS BRAIN. Warning Signs of HEART ATTACK Call 911 if you have these symptoms: 
? Chest discomfort. Most heart attacks involve discomfort in the center of the chest that lasts more than a few minutes, or that goes away and comes back. It can feel like uncomfortable pressure, squeezing, fullness, or pain. ? Discomfort in other areas of the upper body. Symptoms can include pain or discomfort in one or both arms, the back, neck, jaw, or stomach. ? Shortness of breath with or without chest discomfort. ? Other signs may include breaking out in a cold sweat, nausea, or lightheadedness. Don't wait more than five minutes to call [address]!  Fast action can save your life. Calling 911 is almost always the fastest way to get lifesaving treatment. Emergency Medical Services staff can begin treatment when they arrive  up to an hour sooner than if someone gets to the hospital by car. The discharge information has been reviewed with the patient and spouse. The patient and spouse verbalized understanding. Discharge medications reviewed with the patient and spouse and appropriate educational materials and side effects teaching were provided. ___________________________________________________________________________________________________________________________________ ACO Transitions of Care Introducing [facility] [address] offers a voluntary care coordination program to provide high quality service and care to [facility] fee-for-service beneficiaries. Soren Duran was designed to help you enhance your health and well-being through the following services: ? Transitions of Care  support for individuals who are transitioning from one care setting to another (example: Hospital to home). ? Chronic and Complex Care Coordination  support for individuals and caregivers of those with serious or chronic illnesses or with more than one chronic (ongoing) condition and those who take a number of different medications. If you meet specific medical criteria, a [facility] [address] may call you directly to coordinate your care with your primary care physician and your other care providers. For questions about the [facility] programs, please, contact your physicians office. For general questions or additional information about Accountable Care Organizations: 
Please visit www.medicare.gov/acos. html or call 1-800-MEDICARE (8-942.801.7262) TTY users should call 8-929.789.2753. Introducing [facility]! Dear Sveta Romeo: Thank you for requesting a [employer]istant account. Our records indicate that you have previously registered for a PharmAssistant account but its currently inactive. Please call our [employer]istant support line at [phone]. Additional Information If you have questions, please visit the Frequently Asked Questions section of the PharmAssistant website at [email or web address] Remember, PharmAssistant is NOT to be used for urgent needs. For medical emergencies, dial 911. Now available from your iPhone and Android! Unresulted Labs-Please follow up with your PCP about these lab tests Order Current Status [place] [facility] TECHNOLOGIST SERVICE In process Providers Seen During Your Hospitalization Provider Specialty Primary office phone Kay Lizarraga MD Pulmonary Disease [phone] Your Primary Care Physician (PCP) Primary Care Physician Office Phone Office Fax [address], [address] [place] [address] [phone] You are allergic to the following Allergen Reactions Latex Hives Tape (Adhesive) Rash Recent Documentation Height Weight BMI Smoking Status 1.829 m 77 kg 23.03 kg/m2 Former Smoker Emergency Contacts Name Discharge Info Relation Home Work [facility] - RESIDENT DRUG TREATMENT (WOMEN) DISCHARGE CAREGIVER [3] Spouse [3] [phone] Patient Belongings The following personal items are in your possession at time of discharge: 
  Dental Appliances: None  Visual Aid: Glasses (all personal items to go in preop locker ) Please provide this summary of care documentation to your next provider. Signatures-by signing, you are acknowledging that this After Visit Summary has been reviewed with you and you have received a copy. Patient Signature:  ____________________________________________________________  Date:  ____________________________________________________________  
  
Fread Fonsecaes    
    
 Provider Signature:  ____________________________________________________________ Date:  ____________________________________________________________

## 2018-02-08 NOTE — ANESTHESIA PREPROCEDURE EVALUATION
Anesthetic History   No history of anesthetic complications            Review of Systems / Medical History  Patient summary reviewed and pertinent labs reviewed    Pulmonary  Within defined limits                 Neuro/Psych   Within defined limits           Cardiovascular    Hypertension          Past MI, CAD and PAD         GI/Hepatic/Renal     GERD           Endo/Other      Hypothyroidism: well controlled  Blood dyscrasia, cancer and anemia     Other Findings   Comments: Documentation of current medication  Current medications obtained, documented and obtained? YES      Risk Factors for Postoperative nausea/vomiting:       History of postoperative nausea/vomiting? NO       Female? NO       Motion sickness? NO       Intended opioid administration for postoperative analgesia? YES      Smoking Abstinence:  Current Smoker? NO  Elective Surgery? YES  Seen preoperatively by anesthesiologist or proxy prior to day of surgery? YES  Pt abstained from smoking 24 hours prior to anesthesia?  N/A    Preventive care/screening for High Blood Pressure:  Aged 18 years and older: YES  Screened for high blood pressure: YES  Patients with high blood pressure referred to primary care provider   for BP management: YES               Physical Exam    Airway  Mallampati: II    Neck ROM: normal range of motion   Mouth opening: Normal     Cardiovascular  Regular rate and rhythm,  S1 and S2 normal,  no murmur, click, rub, or gallop  Rhythm: regular  Rate: normal         Dental  No notable dental hx       Pulmonary  Breath sounds clear to auscultation               Abdominal  GI exam deferred       Other Findings            Anesthetic Plan    ASA: 3  Anesthesia type: general          Induction: Intravenous  Anesthetic plan and risks discussed with: Patient

## 2018-02-08 NOTE — ANESTHESIA POSTPROCEDURE EVALUATION
Post-Anesthesia Evaluation and Assessment    Patient: Pratibha Ambriz MRN: [id number]  SSN: [id number]    YOB: 1935  Age: [de-identified] y.o. Sex: male       Cardiovascular Function/Vital Signs  Visit Vitals    /61    Pulse 78    Temp 36.2 °C (97.2 °F)    Resp 16    Ht 6' (1.829 m)    Wt 77 kg (169 lb 12.8 oz)    SpO2 92%    BMI 23.03 kg/m2       Patient is status post general anesthesia for Procedure(s):  ENDOSCOPIC BRONCHOSCOPY ULTRASOUND (EBUS)/C-ARM  with FNA , biopsies and brushings, washings, lavage. Nausea/Vomiting: None    Postoperative hydration reviewed and adequate. Pain:  Pain Scale 1: Numeric (0 - 10) (02/08/18 1423)  Pain Intensity 1: 0 (02/08/18 1423)   Managed    Neurological Status: At baseline    Mental Status and Level of Consciousness: Arousable    Pulmonary Status:   O2 Device: Room air (02/08/18 1437)   Adequate oxygenation and airway patent    Complications related to anesthesia: None    Post-anesthesia assessment completed.  No concerns    Signed By: Kayleen Victoria MD     February 8, 2018

## 2018-02-13 NOTE — TELEPHONE ENCOUNTER
Requested Prescriptions     Pending Prescriptions Disp Refills    clopidogrel (PLAVIX) 75 mg tab 90 Tab 6

## 2018-02-16 NOTE — PROGRESS NOTES
The pt. C/o increased hoarseness since having the CT Needle Biopsy. He also has c/o pain right kidney area since the biopsy. He had a tinge of pink streaks in the prod. Of quite yellow sputum yesterday. The yellow sputum is persistent.

## 2018-02-16 NOTE — LETTER
2/19/2018 12:26 AM 
 
Patient:  John Chang YOB: 1935 Date of Visit: 2/16/2018 Dear Leela Chacon MD 
[address] [facility] [address] VIA In Basket Analia Rivera MD 
[address] [place] [address] VIA Facsimile: [phone] Agustin Siegel [address] [place] [address] VIA In Basket 
 : Thank you for referring Mr. Rosa Roberson to me for evaluation/treatment. Below are the relevant portions of my assessment and plan of care. Pt underwent biopsies of his B/L lung lesions - both of which showed squamous cell carcinoma. If you have questions, please do not hesitate to call me. I look forward to following Mr. Tejeda along with you. Sincerely, Lorena Crocker MD/MPH Pulmonary, Critical Care Medicine [facility]

## 2018-02-16 NOTE — MR AVS SNAPSHOT
[address] [facility], [address] [address] 
[phone] Patient: Quin Martínez MRN: [id number] DFS:[id number] Visit Information Date & Time Provider Department Dept. Phone Encounter #  
 2/16/2018 12:00 PM Jen Dorado MD [facility] Toby Lal [id number] Your Appointments 4/25/2018 10:15 AM  
ULTRASOUND with Ramsey Faustin MD  
[facility] Appt Note: PVR  
 [address] [place] [address] [address]  
[phone] [address] [facility] [address] Upcoming Health Maintenance Date Due DTaP/Tdap/Td series (1 - Tdap) 10/15/1956 ZOSTER VACCINE AGE 60> 8/15/1997 GLAUCOMA SCREENING Q2Y 10/15/2002 Pneumococcal 65+ Low/Medium Risk (2 of 2 - PCV13) 3/29/2017 Influenza Age 5 to Adult 8/1/2017 MEDICARE YEARLY EXAM 10/19/2018 Allergies as of 2/16/2018  Review Complete On: 2/16/2018 By: Axel Gee LPN Severity Noted Reaction Type Reactions Latex High 02/16/2017    Hives Tape [Adhesive] High 02/16/2017    Rash Current Immunizations  Reviewed on 2/6/2018 Name Date Influenza High Dose Vaccine PF 10/18/2016 Influenza Vaccine 10/25/2013 Influenza Vaccine PF 10/27/2015 Pneumococcal Polysaccharide (PPSV-23) 3/29/2016 12:55 PM  
  
 Not reviewed this visit You Were Diagnosed With   
  
 Codes Comments Centrilobular emphysema ([facility] [address]    -  Primary ICD-10-CM: J43.2 ICD-9-CM: 492.8 Squamous cell lung cancer, left (HCC)     ICD-10-CM: C34.92 
ICD-9-CM: 162.9 Squamous cell carcinoma of right lung (HCC)     ICD-10-CM: C34.91 
ICD-9-CM: 162.9 Metastatic squamous cell carcinoma (HCC)     ICD-10-CM: C79.9, C80.1 ICD-9-CM: 199.1 Vitals BP Pulse Temp Resp Height(growth percentile) Weight(growth percentile) 104/56 (BP 1 Location: Left arm, BP Patient Position: Sitting) 90 97 °F (36.1 °C) 20 6' (1.829 m) 171 lb 4.8 oz (77.7 kg) SpO2 BMI Smoking Status 98% 23.23 kg/m2 Former Smoker BMI and BSA Data Body Mass Index Body Surface Area  
 23.23 kg/m 2 1.99 m 2 Preferred Pharmacy Pharmacy Name Phone [facility] [phone] Your Updated Medication List  
  
   
This list is accurate as of: 2/16/18  1:11 PM.  Always use your most recent med list.  
  
  
  
  
 albuterol 90 mcg/actuation inhaler Commonly known as:  PROVENTIL HFA, VENTOLIN HFA, PROAIR HFA Take 2 Puffs by inhalation every six (6) hours as needed for Wheezing. amLODIPine 5 mg tablet Commonly known as:  Ardeen Squire TAKE ONE TABLET BY MOUTH ONCE DAILY  
  
 aspirin 81 mg chewable tablet Take 1 Tab by mouth daily. clopidogrel 75 mg Tab Commonly known as:  PLAVIX TAKE ONE TABLET BY MOUTH ONCE DAILY WITH  DINNER  
  
 guaiFENesin-codeine 100-10 mg/5 mL solution Commonly known as:  ROBITUSSIN AC  
1-2 tsp TID as needed for cough. . Do not drive if taking this medication  
  
 inhalational spacing device Commonly known as:  E-Z SPACER  
1 Each by Does Not Apply route as needed. isosorbide mononitrate ER 60 mg CR tablet Commonly known as:  IMDUR  
1 tablet each AM  
  
 levothyroxine 88 mcg tablet Commonly known as:  SYNTHROID  
TAKE ONE TABLET BY MOUTH ONCE DAILY BEFORE BREAKFAST  
  
 lovastatin 20 mg tablet Commonly known as:  MEVACOR Take 1 Tab by mouth daily. MIRALAX PO Take  by mouth as needed. multivitamin with iron tablet Take 1 Tab by mouth daily. Omeprazole delayed release 20 mg tablet Commonly known as:  PRILOSEC D/R Take 1 Tab by mouth daily. oxybutynin 5 mg tablet Commonly known as:  IZURXYJV Take 1 Tab by mouth three (3) times daily.  Indications: Bladder Hyperactivity, URINARY URGENCY  
  
 tamsulosin 0.4 mg capsule Commonly known as:  FLOMAX  
1 tablet daily at supper TYLENOL EXTRA STRENGTH PO Take  by mouth as needed. * umeclidinium-vilanterol 62.5-25 mcg/actuation inhaler Commonly known as:  Dellis Paola Take 1 Puff by inhalation daily. * umeclidinium-vilanterol 62.5-25 mcg/actuation inhaler Commonly known as:  Dellis Paola Take 1 Puff by inhalation daily. * Notice: This list has 2 medication(s) that are the same as other medications prescribed for you. Read the directions carefully, and ask your doctor or other care provider to review them with you. Prescriptions Sent to Pharmacy Refills  
 umeclidinium-vilanterol (ANORO ELLIPTA) 62.5-25 mcg/actuation inhaler 5 Sig: Take 1 Puff by inhalation daily. Class: Normal  
 Pharmacy: [address] [place] [address], [address] [place] [address] Ph #: [phone] Route: Inhalation  
 umeclidinium-vilanterol (ANORO ELLIPTA) 62.5-25 mcg/actuation inhaler 0 Sig: Take 1 Puff by inhalation daily. Class: Normal  
 Pharmacy: [address] [place] [address], [address] [place] [address] ROAD Ph #: [phone] Route: Inhalation To-Do List   
 02/17/2018 Procedures: AMB POC PFT COMPLETE W/BRONCHODILATOR   
  
 02/17/2018 Procedures: AMB POC PFT COMPLETE W/O BRONCHODILATOR   
  
 02/17/2018 Procedures:  DIFFUSING CAPACITY   
  
 02/17/2018 Procedures:  GAS DILUT/WASHOUT LUNG VOL W/WO DISTRIB VENT&VOL Introducing [facility]! Sy Santiago introduces [employer] patient portal. Now you can access parts of your medical record, email your doctor's office, and request medication refills online. 1. In your internet browser, go to [email or web address] 2. Click on the First Time User? Click Here link in the Sign In box. You will see the New Member Sign Up page. 3. Enter your itzat Access Code exactly as it appears below. You will not need to use this code after youve completed the sign-up process. If you do not sign up before the expiration date, you must request a new code. · itzat Access Code: [email or web address] Expires: 5/12/2018  7:28 PM 
 
4. Enter the last four digits of your Social Security Number (xxxx) and Date of Birth (mm/dd/yyyy) as indicated and click Submit. You will be taken to the next sign-up page. 5. Create a [employer] ID. This will be your itzat login ID and cannot be changed, so think of one that is secure and easy to remember. 6. Create a itzat password. You can change your password at any time. 7. Enter your Password Reset Question and Answer. This can be used at a later time if you forget your password. 8. Enter your e-mail address. You will receive e-mail notification when new information is available in [address] 9. Click Sign Up. You can now view and download portions of your medical record. 10. Click the Download Summary menu link to download a portable copy of your medical information. If you have questions, please visit the Frequently Asked Questions section of the itzat website. Remember, itzat is NOT to be used for urgent needs. For medical emergencies, dial 911. Now available from your iPhone and Android! Please provide this summary of care documentation to your next provider. Your primary care clinician is listed as Daysi Jacobson. If you have any questions after today's visit, please call [phone].

## 2018-02-16 NOTE — PROGRESS NOTES
[address] [facility], [address] [place] [address] [facility]  Pulmonary, Critical Care, and Sleep Medicine    Pulmonary Office Followup  Name: Ernestine Garnett [de-identified] y.o. male  MRN: [id number]  : 1935  Service Date: 18  Chief Complaint:   Chief Complaint   Patient presents with    Results     F/U to  PET , CT Needle Bx          History of Present Illness:  (Hx obtained from patient and wife)  Ernestine Garnett is a [de-identified] y.o. male, who presents to Pulmonary clinic for followup with regards to his abnormal chest imaging. Pt was last seen on . In the interval, he underwent CT guided bx of right lung mass on , and bronchoscopy with EBUS and endbronchial bx on 18. Pt reports some mild hemoptysis, occurs in the mornings  Pt also reports some increased voice hoarseness -- new  Denies recent URI, fevers, chills, worsening cough  Pt has an appointment with Dr. Abbe Singh on Tuesday at 9:30am.  Pt denies any dyspnea  Pt using PRN albuterol - about 2-3x per day the last few days. 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 Authorizing Provider   ACETAMINOPHEN (TYLENOL EXTRA STRENGTH PO) Take  by mouth as needed. Yes Historical Provider   lovastatin (MEVACOR) 20 mg tablet Take 1 Tab by mouth daily. 18  Yes Jackson Bedlola MD   oxybutynin (DITROPAN) 5 mg tablet Take 1 Tab by mouth three (3) times daily.  Indications: Bladder Hyperactivity, URINARY URGENCY 18  Yes Jackson Bedolla MD   Omeprazole delayed release [facility] D/R) 20 mg tablet Take 1 Tab by mouth daily. 1/8/18  Yes Geroldine Runner, NP   clopidogrel (PLAVIX) 75 mg tab TAKE ONE TABLET BY MOUTH ONCE DAILY WITH  DINNER 1/2/18  Yes Trent Mark MD   albuterol (PROVENTIL HFA, VENTOLIN HFA, PROAIR HFA) 90 mcg/actuation inhaler Take 2 Puffs by inhalation every six (6) hours as needed for Wheezing. 10/18/17  Yes Geroldine Runner, NP   inhalational spacing device (E-Z SPACER) 1 Each by Does Not Apply route as needed. 10/18/17  Yes Geroldine Runner, NP   levothyroxine (SYNTHROID) 88 mcg tablet TAKE ONE TABLET BY MOUTH ONCE DAILY BEFORE BREAKFAST 2/15/17  Yes Geroldine Runner, NP   amLODIPine (NORVASC) 5 mg tablet TAKE ONE TABLET BY MOUTH ONCE DAILY 2/15/17  Yes Geroldine Runner, NP   isosorbide mononitrate ER (IMDUR) 60 mg CR tablet 1 tablet each AM 2/15/17  Yes Geroldine Runner, NP   multivitamin with iron tablet Take 1 Tab by mouth daily. Yes Historical Provider   POLYETHYLENE GLYCOL 3350 (MIRALAX PO) Take  by mouth as needed. Yes Historical Provider   tamsulosin (FLOMAX) 0.4 mg capsule 1 tablet daily at supper 5/25/16  Yes Trent Mark MD   aspirin 81 mg chewable tablet Take 1 Tab by mouth daily. 5/5/16  Yes Frederich Lefort, MD   guaiFENesin-codeine (ROBITUSSIN AC) 100-10 mg/5 mL solution 1-2 tsp TID as needed for cough. . Do not drive if taking this medication 10/18/17   Geroldine Runner, NP       Immunizations:  I have reviewed the patient's immunizations  Immunization History   Administered Date(s) Administered    Influenza High Dose Vaccine PF 10/18/2016    Influenza Vaccine 10/25/2013    Influenza Vaccine PF 10/27/2015    Pneumococcal Polysaccharide (PPSV-23) 03/29/2016       Review of Systems:  A complete review of systems was performed as stated in the HPI, all others are negative.       Objective:    Physical Exam:  /56 (BP 1 Location: Left arm, BP Patient Position: Sitting)  Pulse 90  Temp 97 °F (36.1 °C)  Resp 20  Ht 6' (1.829 m)  Wt 77.7 kg (171 lb 4.8 oz)  SpO2 98%  BMI 23.23 kg/m2  Vitals were personally reviewed  Gen: elderly, no acute distress, pleasant and cooperative, sitting up in chair, walks with walker slowly  HEENT: normocephalic/atraumatic, Right eye reactive to light and EOMI, left eye deviated to left, unreactive, no scleral icterus, no oral lesions, poor dentition, Mallampati II  Neck: supple, trachea midline, no JVD, no cervical and supraclavicular adenopathy  CVS: regular rate rhythm, S1/S2, no murmurs/rubs/gallops  Lungs: good air entry B/L, no wheezes/rales/rhonchi  Abdomen: soft, nontender, bowel sounds present, no hepatosplenomegaly  Ext: no pitting edema B/L, no peripheral cyanosis or clubbing  Neuro: grossly normal, AAOx3, normal strength and coordination grossly, no focal deficits  Psych: normal memory, thought content, and tangential processing    Labs: I have reviewed the patient's available labs -- BMP wnl from 2/6/18 -- CBC shows mild normocytic anemia    Imaging:  I have personally reviewed patient's imaging as follows -- PET scan from 2/1/18 shows B/L upper lobe hypermetabolic lesions, multiple, as well as hypermetabolic activity in right vocal cord, and left hilum  Official report per radiology  POSITRON EMISSION TOMOGRAPHY (WITH NONCONTRAST CT), SKULL BASE TO MID THIGHS     INDICATION: New bilateral upper lobe lung masses in the setting of prior history  of colorectal carcinoma. 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 Radiation chemotherapy 2014.        PRIOR PET: 11/6/14     REFERENCES: CT chest 1/19/2018, CT abdomen and pelvis without contrast 4/9/2016,  CTA abdomen and pelvis 3/20/2016, bone scan 8/26/2015.     TECHNIQUE: Following i.v. administration of 17.22 mCi 18F-fluorodeoxyglucose  (FDG) administered to left antecubital fossa IV , PET was performed spanning  from the skull base to the mid thighs.   CT was performed just prior to PET to  provide attenuation correction and anatomic localization for the PET. CT images  acquired with PET are not standard diagnostic CT, limited by lack of i.v.  contrast, reduced mAs (x-rays), and slower imaging during respiration (to  improve fusion with PET images).     SERUM GLUCOSE: 106 mg/dl        PET/CT FINDINGS:  Mediastinal blood pool reference SUV max 2.3. Liver parenchyma reference SUV max 3.6.     HEAD/NECK: Asymmetric activity associated with the right larynx/vocal fold level  with absent left focal fold activity. CT configuration suggests asymmetry  related to left vocal fold paralysis, likely related to impairment of the left  recurrent laryngeal nerve by the left mediastinal/lung process. Some of the  activity localizes to the right thyroid cartilage although may be misregistered  without definitive concerning bone abnormality.     CHEST: Hypermetabolic left upper lobe apical paramediastinal lung mass measures  3.5 x 2.9 cm, increased from 5 mm on prior PET. This mass is contiguous with  additional hypermetabolic left upper lobe paramediastinal neoplastic  tissue/abutting masses extending caudally to the superior hilar margin. SUV Max  12.1-13. 5.     More lateral left upper lobe apex hypermetabolic mass measures 2.5 x 2.2 cm,  increased from 6 mm. SUV Max 12. 5.     Right upper lobe 3.2 x 3.3 cm hypermetabolic mass is increased from 6 mm in  2014. SUV Max 11.4, increased from 0.99. Adjacent new 5 mm nodule on image 80 is  too small for PET characterization, but is likely also metastatic.     Left AP window mediastinal metastatic tissue is hypermetabolic with SUV Max 5.1. Additional hypermetabolic wade disease at the superior left hilum with SUV Max  approximately 4.6.     Metastatic small site of posterior left superior paramediastinal pleural  thickening with SUV Max 3.4.     ABDOMEN/PELVIS: The lower rectum/anal canal region is thickened, similar to  remote prior studies.  Activity is similar to that seen elsewhere in bowel. Cluster of punctate foci of moderate activity are noted at the lower rectal wall  12-3:00 position (SUV Max 3.9), currently with appearance most likely related to  physiologic activity and regional artifact or posttreatment change rather than  malignancy.     OSSEOUS STRUCTURES: No additional malignant hypermetabolic activity. Right  thyroid cartilage activity as above.        OTHER CT FINDINGS:  Surgical changes. Atherosclerotic disease. Femoral-femoral bypass graft. Diverticulosis. No adenopathy in the abdomen, pelvis or neck. Stable to recent  CT chest. Left hip replacement. Left humerus repair. Decreased bone  mineralization. Lumbar degenerative changes.        IMPRESSION  IMPRESSION:     1. Growth of FDG avid, malignant left upper lobe lung masses and right upper  lobe lung mass since 2014 PET. 2. Metastatic involvement of the left hilum and left mediastinal AP window. 3. Metastatic involvement of the left upper paramediastinal pleura. 4. 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 5. No evidence of metastatic disease beyond the thorax. 6. No convincing, definitive evidence of recurrence at the anal/rectal region,  limited by regions of heterogeneous moderate activity that may all be  physiologic or related to posttreatment changes. Greatest nonspecific activity  is at left anterior lower rectal wall.       Pathology reports:  Personally reviewed from 2/6 and 2/8 -- RUL mass showed SCC;  Left hilar endobronchial lesion showed SCC    PFTs:  None available    TTE:  I have reviewed the patient's TTE results  Results for orders placed or performed during the hospital encounter of 04/09/16   2D ECHO COMPLETE ADULT (TTE) W OR WO CONTR     Status: None    [address]  [place], [address] [phone]    Transthoracic Echocardiogram    Patient: Elsi Saravia Breanna Stringer  MRN: [id number]  [facility]  : 1935  Age: 68 years  Gender: Male  Height: 73 in  Weight: 165.7 lb  BSA: 1.99 m squared  BP: 105 / 82 mmHg  Study date: 2016  Status: Routine  Location: [facility] DMS [address] #: [id number]    Allergies: NO KNOWN ALLERGIES    Interpreting Group:  [address] [place] [address]  Interpreting Physician:  Ericka Jovel. Kathy Ca MD  Technologist:  Marisol Garcia. Roshan Nagel  Referring_Ordering Physician: Mariusz Vidal PA-C    SUMMARY:  Left ventricle: Systolic function was at the lower limits of normal.  Ejection fraction was estimated to be 50 %. There was hypokinesis of the  basal-mid inferoseptal, entire inferior, basal-mid inferolateral, apical  septal, and apical lateral wall(s). There was hypokinesis. Doppler  parameters were consistent with abnormal left ventricular relaxation  (grade 1 diastolic dysfunction). Right ventricle: Estimated peak pressure was 30 mmHg. Mitral valve: There was mild annular calcification. There was mild  regurgitation. COMPARISONS:  Comparison was made with the previous study of 14-Dec-2015. INDICATIONS: NSTEMI. HISTORY: Prior history: Hypotension, GI bleed, abnormal nuclear stress  test in 2015, hypercholesterolemia, peripheral vascular  disease, hypothyroidism, coronary artery disease, arterial occlusion. PROCEDURE: This was a routine study. The study included limited 2D  imaging, complete spectral Doppler, and color Doppler. The heart rate was  59 bpm, at the start of the study. Systolic blood pressure was 105 mmHg,  at the start of the study. Diastolic blood pressure was 82 mmHg, at the  start of the study. This was a technically difficult study. LEFT VENTRICLE: Size was normal. Systolic function was at the lower limits  of normal. Ejection fraction was estimated to be 50 %. There was  hypokinesis of the basal-mid inferoseptal, entire inferior, basal-mid  inferolateral, apical septal, and apical lateral wall(s).  There was  hypokinesis. Wall thickness was normal. DOPPLER: Doppler parameters were  consistent with abnormal left ventricular relaxation (grade 1 diastolic  dysfunction). RIGHT VENTRICLE: The size was normal. Systolic function was normal. Wall  thickness was normal. DOPPLER: Estimated peak pressure was 30 mmHg. LEFT ATRIUM: Size was normal. LA volume index was 30 ml/m squared. RIGHT ATRIUM: Size was normal.    MITRAL VALVE: There was mild annular calcification. There was normal  leaflet separation. DOPPLER: The transmitral velocity was within the  normal range. There was mild regurgitation. AORTIC VALVE: The valve was trileaflet. Leaflets exhibited normal  thickness and normal cuspal separation. DOPPLER: Transaortic velocity was  within the normal range. There was no stenosis. There was no significant  regurgitation. TRICUSPID VALVE: There was normal leaflet separation. DOPPLER:  Transtricuspid velocity could not be determined. There was mild  regurgitation. PULMONIC VALVE: Not well visualized, but normal Doppler findings. AORTA: The root exhibited normal size. SYSTEMIC VEINS: IVC: The inferior vena cava was normal in size and course. Respirophasic changes were normal.    PERICARDIUM: No significant pericardial effusion identified.     SYSTEM MEASUREMENT TABLES    2D  AV Cusp: 2.07 cm  Ao Diam: 2.66 cm  IVSd: 1.02 cm  LVIDd: 4.36 cm  LVIDs: 3.36 cm  LVPWd: 1.03 cm  SV(Teich): 39.64 ml  EDV(Teich): 85.66 ml  ESV(Teich): 46.02 ml  LAAs A2C: 23.28 cm2  LAAs A4C: 15.33 cm2  LAESV A-L A2C: 76.49 ml  LAESV A-L A4C: 38.56 ml  LAESV Index (A-L): 29.43 ml/m2  LAESV MOD A2C: 71.69 ml  LAESV MOD A4C: 35.61 ml  LAESV(A-L): 58.56 ml  LALs A2C: 6.01 cm  LALs A4C: 5.17 cm  LVEDV MOD A2C: 48.78 ml  LVEDV MOD A4C: 54.76 ml  LVEDV MOD BP: 53.82 ml  LVEF MOD A4C: 47.29 %  LVESV MOD A2C: 19.87 ml  LVESV MOD A4C: 28.86 ml  LVESV MOD BP: 24.16 ml  LVLd A2C: 7.77 cm  LVLd A4C: 8.48 cm  LVLs A2C: 6.77 cm  LVLs A4C: 7.02 cm  LVOT Diam: 2.85 cm  RA Area: 15.84 cm2  SV MOD A2C: 28.91 ml  SV MOD A4C: 25.9 ml    CW  TR Vmax: 2.62 m/s  TR maxP.45 mmHG    PW  LVOT VTI: 15.88 cm  LVOT Vmax: 0.99 m/s  LVOT Vmean: 0.46 m/s  MV A Jose: 0.67 m/s  MV Dec [place]: 4.1 m/s2  MV DecT: 223.87 ms  MV E Jose: 0.91 m/s  MV E/A Ratio: 1.37  E': 0.06 m/s  E/E': 14.48  HR: 58.85 BPM  LVCI Dopp: 2.99 l/minm2  LVCO Dopp: 5.94 L/min  LVOT maxPG: 3.94 mmHG  LVOT meanP.08 mmHG  LVSI Dopp: 50.77 ml/m2  LVSV Dopp: 101.02 ml  Lateral E': 0.13 m/s  Lateral E/E': 7.02    Prepared and E-signed by    Deacon Sloan. Severa Patience, MD  Signed 2016 12:42:15           Assessment and Plan:  [de-identified] y.o. male with:    Impression:  1. Metastatic squamous cell carcinoma:  Pt has B/L apical lung lesions and hx of previous anal cancer that was treated in . Pt underwent CT guided bx of right lung mass on 18 and bronchoscopy with EBUS/TBNA and endobronchial bx on 18 -- both pathology reports consistent with SCC. 2. COPD/emphysema:  Based on smoking hx and hyperinflation seen on CXR. 3. Chronic cough:  Etiology likely multifactorial - emphysema, vs LPR with vocal cord lesion (?metastasis)  4. Hx of anal cancer s/p chemoradiation in , used to follow with Dr. Brenda Neumann  5. Cardiomyopathy  6. PAD/PVD: s/p multiple procedures including femfem bypass and B/L endarterectomy and angiopplasty. Pt follows with Vascular surgery  7. Hx of smoking: quit 30 years ago -- smoked 1PPD for 35 years  6. Hx of silica exposure    Plan:  -Clois Regulus over biopsy results with patient noting that they were SCC, similar to his previous cancer in . Noted that this was metastatic because it involved both lungs.   Advised patient to followup with Oncology - Dr. Brenda Neumann as soon as possible (scheduled for this upcoming Tuesday)  -full PFTs  -Pt declining 6mwt due to foot bunion and unsteadiness  -start Anoro ellipta 1 puff once daily, sample given in clinic and educated on proper inhaler technique  -Advised to continue albuterol PRN  -Reflux lifestyle precautions  -Management of cardiomyopathy and CAD per PCP  -Management of PVD/PAD per Vascular Surgery, pt on Plavix      RTC: Follow-up Disposition:  Return in about 3 months (around 5/16/2018).       Orders Placed This Encounter    AMB POC PFT COMPLETE W/BRONCHODILATOR    AMB POC PFT COMPLETE W/O BRONCHODILATOR    GAS DILUT/WASHOUT LUNG VOL W/WO DISTRIB VENT&VOL    DIFFUSING CAPACITY    umeclidinium-vilanterol (ANORO ELLIPTA) 62.5-25 mcg/actuation inhaler    DISCONTD: umeclidinium-vilanterol (ANORO ELLIPTA) 62.5-25 mcg/actuation inhaler         Alyssa Reyes MD/MPH     Pulmonary, Critical Care Medicine  [facility]

## 2018-02-22 NOTE — TELEPHONE ENCOUNTER
Requested Prescriptions     Pending Prescriptions Disp Refills    levothyroxine (SYNTHROID) 88 mcg tablet 90 Tab 3     Sig: TAKE ONE TABLET BY MOUTH ONCE DAILY BEFORE BREAKFAST    isosorbide mononitrate ER (IMDUR) 60 mg CR tablet 90 Tab 3     Si tablet each AM    amLODIPine (NORVASC) 5 mg tablet 90 Tab 3     Sig: TAKE ONE TABLET BY MOUTH ONCE DAILY     90 day supply

## 2018-03-07 NOTE — PROCEDURES
[facility]  *** [facility]AL REPORT ***    Name: Titi Valverde  MRN: [id number]    Outpatient  : 15 Oct 1935  HIS Order #: [id number]  [address] [facility] Visit #: [id number]  Date: 07 Mar 2018    TYPE OF TEST: Peripheral Venous Testing    REASON FOR TEST  Edema    Left Leg:-  Deep venous thrombosis:           No  Superficial venous thrombosis:    Not examined  Deep venous insufficiency:        Not examined  Superficial venous insufficiency: Not examined      INTERPRETATION/FINDINGS  Duplex images were obtained using 2-D gray scale, color flow, and  spectral Doppler analysis. Left leg :  1. No evidence of deep venous thrombosis detected in the veins  visualized. 2. Deep vein(s) visualized include the common femoral, femoral,  popliteal, posterior tibial, and peroneal veins. 3. Great saphenous vein non-visualized at the sapheno-femoral  junction. 4. No evidence of deep vein thrombosis in the contralateral common  femoral vein. ADDITIONAL COMMENTS    I have personally reviewed the data relevant to the interpretation of  this  study. TECHNOLOGIST: Mat Keys. Aakash COLEY  Signed: 2018 03:01 PM    PHYSICIAN: Pacheco Manzano MD  Signed: 2018 06:00 PM

## 2018-03-07 NOTE — PROGRESS NOTES
Left lower extremity venous duplex exam complete; preliminary findings to follow. Armband removed and patient discharged.

## 2018-04-25 NOTE — PATIENT INSTRUCTIONS
Benign Prostatic Hyperplasia: Care Instructions  Your Care Instructions    Benign prostatic hyperplasia, or BPH, is an enlarged prostate gland. The prostate is a small gland that makes some of the fluid in semen. Prostate enlargement happens to almost all men as they age. It is usually not serious. BPH does not cause prostate cancer. As the prostate gets bigger, it may partly block the flow of urine. You may have a hard time getting a urine stream started or completely stopped. BPH can cause dribbling. You may have a weak urine stream, or you may have to urinate more often than you used to, especially at night. Most men find these problems easy to manage. You do not need treatment unless your symptoms bother you a lot or you have other problems, such as bladder infections or stones. In these cases, medicines may help. Surgery is not needed unless the urine flow is blocked or the symptoms do not get better with medicine. Follow-up care is a key part of your treatment and safety. Be sure to make and go to all appointments, and call your doctor if you are having problems. It's also a good idea to know your test results and keep a list of the medicines you take. How can you care for yourself at home? · Take plenty of time to urinate. Try to relax. · Try \"double voiding. \" Urinate as much you can, relax for a few moments, and then try to urinate again. · Sit on the toilet to urinate. · Read or think of other things while you are waiting. · Turn on a faucet, or try to picture running water. Some men find that this helps get their urine flowing. · If dribbling is a problem, wash your penis daily to avoid skin irritation and infection. · Avoid caffeine and alcohol. These drinks will increase how often you need to urinate. Spread your fluid intake throughout the day. If the urge to urinate often wakes you at night, limit your fluid intake in the evening. Urinate right before you go to bed.   · Many over-the-counter cold and allergy medicines can make the symptoms of BPH worse. Avoid antihistamines, decongestants, and allergy pills, if you can. Read the warnings on the package. · If you take any prescription medicines, especially tranquilizers or antidepressants, ask your doctor or pharmacist whether they can cause urination problems. There may be other medicines you can use that do not cause urinary problems. · Be safe with medicines. Take your medicines exactly as prescribed. Call your doctor if you think you are having a problem with your medicine. When should you call for help? Call your doctor now or seek immediate medical care if:  ? · You cannot urinate at all. ? · You have symptoms of a urinary infection. For example:  ¨ You have blood or pus in your urine. ¨ You have pain in your back just below your rib cage. This is called flank pain. ¨ You have a fever, chills, or body aches. ¨ It hurts to urinate. ¨ You have groin or belly pain. ? Watch closely for changes in your health, and be sure to contact your doctor if:  ? · It hurts when you ejaculate. ? · Your urinary problems get a lot worse or bother you a lot. Where can you learn more? Go to http://robert-leno.info/. Enter M912 in the search box to learn more about \"Benign Prostatic Hyperplasia: Care Instructions. \"  Current as of: March 14, 2017  Content Version: 11.4  © 7175-7946 [employer]. Care instructions adapted under license by [employer] (which disclaims liability or warranty for this information). If you have questions about a medical condition or this instruction, always ask your healthcare professional. [address] any warranty or liability for your use of this information.

## 2018-04-25 NOTE — PROGRESS NOTES
Mr. Kate Saunders has a reminder for a \"due or due soon\" health maintenance. I have asked that he contact his primary care provider for follow-up on this health maintenance.

## 2018-04-25 NOTE — MR AVS SNAPSHOT
[address] [place] [address] [address] [facility] [address] 
[phone] Patient: Chencho Munroe MRN: [id number] Visit Information Date & Time Provider Department Dept. Phone Encounter #  
 4/25/2018 10:15 AM [facility], [address] [place] [address] [facility] [phone] Upcoming Health Maintenance Date Due DTaP/Tdap/Td series (1 - Tdap) 10/15/1956 ZOSTER VACCINE AGE 60> 8/15/1997 GLAUCOMA SCREENING Q2Y 10/15/2002 Pneumococcal 65+ High/Highest Risk (2 of 2 - PCV13) 3/29/2017 Influenza Age 5 to Adult 8/1/2017 MEDICARE YEARLY EXAM 10/19/2018 Allergies as of 4/25/2018  Review Complete On: 4/25/2018 By: Paige Donohue LPN Severity Noted Reaction Type Reactions Latex High 02/16/2017    Hives Tape [Adhesive] High 02/16/2017    Rash Current Immunizations  Reviewed on 2/6/2018 Name Date Influenza High Dose Vaccine PF 10/18/2016 Influenza Vaccine 10/25/2013 Influenza Vaccine PF 10/27/2015 Pneumococcal Polysaccharide (PPSV-23) 3/29/2016 12:55 PM  
  
 Not reviewed this visit You Were Diagnosed With   
  
 Codes Comments Benign prostatic hyperplasia with lower urinary tract symptoms, symptom details unspecified    -  Primary ICD-10-CM: N40.1 ICD-9-CM: 600.01 Vitals BP Pulse Height(growth percentile) Weight(growth percentile) SpO2 BMI  
 125/60 (BP 1 Location: Left arm, BP Patient Position: Sitting) 77 6' (1.829 m) 174 lb (78.9 kg) 95% 23.6 kg/m2 Smoking Status Former Smoker Vitals History BMI and BSA Data Body Mass Index Body Surface Area  
 23.6 kg/m 2 2 m 2 Preferred Pharmacy Pharmacy Name Phone [facility] [facility] [phone] Your Updated Medication List  
  
   
This list is accurate as of 4/25/18 11:23 AM.  Always use your most recent med list.  
  
  
  
  
 albuterol 90 mcg/actuation inhaler Commonly known as:  PROVENTIL HFA, VENTOLIN HFA, PROAIR HFA Take 2 Puffs by inhalation every six (6) hours as needed for Wheezing. amLODIPine 5 mg tablet Commonly known as:  Claudine Butterfield TAKE ONE TABLET BY MOUTH ONCE DAILY  
  
 aspirin 81 mg chewable tablet Take 1 Tab by mouth daily. clopidogrel 75 mg Tab Commonly known as:  PLAVIX TAKE ONE TABLET BY MOUTH ONCE DAILY WITH  DINNER  
  
 guaiFENesin-codeine 100-10 mg/5 mL solution Commonly known as:  ROBITUSSIN AC  
1-2 tsp TID as needed for cough. . Do not drive if taking this medication  
  
 inhalational spacing device Commonly known as:  E-Z SPACER  
1 Each by Does Not Apply route as needed. isosorbide mononitrate ER 60 mg CR tablet Commonly known as:  IMDUR  
1 tablet each AM  
  
 levothyroxine 88 mcg tablet Commonly known as:  SYNTHROID  
TAKE ONE TABLET BY MOUTH ONCE DAILY BEFORE BREAKFAST  
  
 lovastatin 20 mg tablet Commonly known as:  MEVACOR Take 1 Tab by mouth daily. MIRALAX PO Take  by mouth as needed. multivitamin with iron tablet Take 1 Tab by mouth daily. Omeprazole delayed release 20 mg tablet Commonly known as:  PRILOSEC D/R Take 1 Tab by mouth daily. oxybutynin 5 mg tablet Commonly known as:  PRMTAJEQ Take 1 Tab by mouth three (3) times daily. Indications: Bladder Hyperactivity, URINARY URGENCY  
  
 tamsulosin 0.4 mg capsule Commonly known as:  FLOMAX  
1 tablet daily at supper TYLENOL EXTRA STRENGTH PO Take  by mouth as needed. umeclidinium-vilanterol 62.5-25 mcg/actuation inhaler Commonly known as:  Janet Zayas Take 1 Puff by inhalation daily. We Performed the Following AMB POC URINALYSIS DIP STICK AUTO W/O MICRO [14175 CPT(R)] Patient Instructions Benign Prostatic Hyperplasia: Care Instructions Your Care Instructions Benign prostatic hyperplasia, or BPH, is an enlarged prostate gland. The prostate is a small gland that makes some of the fluid in semen. Prostate enlargement happens to almost all men as they age. It is usually not serious. BPH does not cause prostate cancer. As the prostate gets bigger, it may partly block the flow of urine. You may have a hard time getting a urine stream started or completely stopped. BPH can cause dribbling. You may have a weak urine stream, or you may have to urinate more often than you used to, especially at night. Most men find these problems easy to manage. You do not need treatment unless your symptoms bother you a lot or you have other problems, such as bladder infections or stones. In these cases, medicines may help. Surgery is not needed unless the urine flow is blocked or the symptoms do not get better with medicine. Follow-up care is a key part of your treatment and safety. Be sure to make and go to all appointments, and call your doctor if you are having problems. It's also a good idea to know your test results and keep a list of the medicines you take. How can you care for yourself at home? · Take plenty of time to urinate. Try to relax. · Try \"double voiding. \" Urinate as much you can, relax for a few moments, and then try to urinate again. · Sit on the toilet to urinate. · Read or think of other things while you are waiting. · Turn on a faucet, or try to picture running water. Some men find that this helps get their urine flowing. · If dribbling is a problem, wash your penis daily to avoid skin irritation and infection. · Avoid caffeine and alcohol. These drinks will increase how often you need to urinate. Spread your fluid intake throughout the day. If the urge to urinate often wakes you at night, limit your fluid intake in the evening. Urinate right before you go to bed.  
· Many over-the-counter cold and allergy medicines can make the symptoms of BPH worse. Avoid antihistamines, decongestants, and allergy pills, if you can. Read the warnings on the package. · If you take any prescription medicines, especially tranquilizers or antidepressants, ask your doctor or pharmacist whether they can cause urination problems. There may be other medicines you can use that do not cause urinary problems. · Be safe with medicines. Take your medicines exactly as prescribed. Call your doctor if you think you are having a problem with your medicine. When should you call for help? Call your doctor now or seek immediate medical care if: 
? · You cannot urinate at all. ? · You have symptoms of a urinary infection. For example: ¨ You have blood or pus in your urine. ¨ You have pain in your back just below your rib cage. This is called flank pain. ¨ You have a fever, chills, or body aches. ¨ It hurts to urinate. ¨ You have groin or belly pain. ? Watch closely for changes in your health, and be sure to contact your doctor if: 
? · It hurts when you ejaculate. ? · Your urinary problems get a lot worse or bother you a lot. Where can you learn more? Go to http://robertPetHubleno.info/. Enter Y214 in the search box to learn more about \"Benign Prostatic Hyperplasia: Care Instructions. \" Current as of: March 14, 2017 Content Version: 11.4 © 6638-3514 [employer]. Care instructions adapted under license by [employer] (which disclaims liability or warranty for this information). If you have questions about a medical condition or this instruction, always ask your healthcare professional. [place] [address] any warranty or liability for your use of this information. Introducing [facility]! [facility] introduces One[employer] patient portal. Now you can access parts of your medical record, email your doctor's office, and request medication refills online.    
 
1. In your internet browser, go to [email or web address] 2. Click on the First Time User? Click Here link in the Sign In box. You will see the New Member Sign Up page. 3. Enter your [facility] Access Code exactly as it appears below. You will not need to use this code after youve completed the sign-up process. If you do not sign up before the expiration date, you must request a new code. · [facility] Access Code: [email or web address] Expires: 5/12/2018  8:28 PM 
 
4. Enter the last four digits of your Social Security Number (xxxx) and Date of Birth (mm/dd/yyyy) as indicated and click Submit. You will be taken to the next sign-up page. 5. Create a [facility] ID. This will be your [facility] login ID and cannot be changed, so think of one that is secure and easy to remember. 6. Create a [facility] password. You can change your password at any time. 7. Enter your Password Reset Question and Answer. This can be used at a later time if you forget your password. 8. Enter your e-mail address. You will receive e-mail notification when new information is available in [address] 9. Click Sign Up. You can now view and download portions of your medical record. 10. Click the Download Summary menu link to download a portable copy of your medical information. If you have questions, please visit the Frequently Asked Questions section of the [facility] website. Remember, [facility] is NOT to be used for urgent needs. For medical emergencies, dial 911. Now available from your iPhone and Android! Please provide this summary of care documentation to your next provider. Your primary care clinician is listed as Rose Obrien. If you have any questions after today's visit, please call [phone].
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
## 2018-06-15 NOTE — ED NOTES
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
## 2018-06-15 NOTE — TELEPHONE ENCOUNTER
Patient wife call and wanted some advice on getting hospice for her  ,she is not sure what to do at this time [phone].

## 2018-06-15 NOTE — DISCHARGE INSTRUCTIONS
Learning About Hospice and Palliative Care  What are hospice and palliative care? Palliative (say \"PAL-leandra-uh-tiv\") care is an area of medicine that helps give you more good days by providing care for quality-of-life issues. It includes treating symptoms like pain, nausea, or sleep problems. It can also include helping you and your loved ones to:  · Understand your illness better. · Talk more openly about your feelings. · Decide what treatments you want or don't want. · Communicate better with your doctors, nurses, and each other. Hospice care is a type of palliative care. But it's for people who are near the end of life. What kinds of care are involved? Palliative care: This treatment helps you feel better physically, emotionally, and spiritually while doctors also treat your illness. Your care may include pain relief, counseling, or nutrition advice. Hospice care: Again, the goal of this type of care is to help you feel better. And it can help you get the most out of the time you have left. But you no longer get treatment to try to cure your illness. When does care happen? Palliative care: This care can happen at any time during a serious illness. You don't have to be near death to get this care. Hospice care: In most cases, you can choose hospice care when your doctor believes that you have no more than about 6 months to live. Where does the care happen? Palliative care: This care often happens in hospitals or long-term care facilities like nursing homes. It can take place wherever you are treated, even in your home. Hospice care: Most hospice care is done in the place the patient calls \"home. \" This is often the person's home. But it could also be a place like a nursing home or [facility] center. Hospice care may also be given in hospice centers, hospitals, and other places. Who provides the care? Palliative care: There are doctors and nurses who specialize in this field.  But your own doctor may also give some of this care. And there are many other experts who may help you. These include social workers, counselors, therapists, and nutrition experts. Hospice care: In hospitals, hospice centers, and other facilities, care is given by doctors, nurses, and others who are trained in hospice care. In the home, a family member is often the main caregiver. But the family member gets help from care experts. They are on call 24 hours a day. Where can you learn more? Go to http://robert-leno.info/. Enter 466 [id number] in the search box to learn more about \"Learning About Hospice and Palliative Care. \"  Current as of: September 24, 2016  Content Version: 11.4  © 6726-8131 [employer]. Care instructions adapted under license by [employer] (which disclaims liability or warranty for this information). If you have questions about a medical condition or this instruction, always ask your healthcare professional. [place] [address] any warranty or liability for your use of this information. Chronic Pain: Care Instructions  Your Care Instructions    Chronic pain is pain that lasts a long time (months or even years) and may or may not have a clear cause. It is different from acute pain, which usually does have a clear cause-like an injury or illness-and gets better over time. Chronic pain:  · Lasts over time but may vary from day to day. · Does not go away despite efforts to end it. · May disrupt your sleep and lead to fatigue. · May cause depression or anxiety. · May make your muscles tense, causing more pain. · Can disrupt your work, hobbies, home life, and relationships with friends and family. Chronic pain is a very real condition. It is not just in your head. Treatment can help and usually includes several methods used together, such as medicines, physical therapy, exercise, and other treatments.  Learning how to relax and changing negative thought patterns can also help you cope. Chronic pain is complex. Taking an active role in your treatment will help you better manage your pain. Tell your doctor if you have trouble dealing with your pain. You may have to try several things before you find what works best for you. Follow-up care is a key part of your treatment and safety. Be sure to make and go to all appointments, and call your doctor if you are having problems. It's also a good idea to know your test results and keep a list of the medicines you take. How can you care for yourself at home? · Pace yourself. Break up large jobs into smaller tasks. Save harder tasks for days when you have less pain, or go back and forth between hard tasks and easier ones. Take rest breaks. · Relax, and reduce stress. Relaxation techniques such as deep breathing or meditation can help. · Keep moving. Gentle, daily exercise can help reduce pain over the long run. Try low- or no-impact exercises such as walking, swimming, and stationary biking. Do stretches to stay flexible. · Try heat, cold packs, and massage. · Get enough sleep. Chronic pain can make you tired and drain your energy. Talk with your doctor if you have trouble sleeping because of pain. · Think positive. Your thoughts can affect your pain level. Do things that you enjoy to distract yourself when you have pain instead of focusing on the pain. See a movie, read a book, listen to music, or spend time with a friend. · If you think you are depressed, talk to your doctor about treatment. · Keep a daily pain diary. Record how your moods, thoughts, sleep patterns, activities, and medicine affect your pain. You may find that your pain is worse during or after certain activities or when you are feeling a certain emotion. Having a record of your pain can help you and your doctor find the best ways to treat your pain. · Take pain medicines exactly as directed.   ¨ If the doctor gave you a prescription medicine for pain, take it as prescribed. ¨ If you are not taking a prescription pain medicine, ask your doctor if you can take an over-the-counter medicine. Reducing constipation caused by pain medicine  · Include fruits, vegetables, beans, and whole grains in your diet each day. These foods are high in fiber. · Drink plenty of fluids, enough so that your urine is light yellow or clear like water. If you have kidney, heart, or liver disease and have to limit fluids, talk with your doctor before you increase the amount of fluids you drink. · If your doctor recommends it, get more exercise. Walking is a good choice. Bit by bit, increase the amount you walk every day. Try for at least 30 minutes on most days of the week. · Schedule time each day for a bowel movement. A daily routine may help. Take your time and do not strain when having a bowel movement. When should you call for help? Call your doctor now or seek immediate medical care if:  ? · Your pain gets worse or is out of control. ? · You feel down or blue, or you do not enjoy things like you once did. You may be depressed, which is common in people with chronic pain. Depression can be treated. ? · You have vomiting or cramps for more than 2 hours. ? Watch closely for changes in your health, and be sure to contact your doctor if:  ? · You cannot sleep because of pain. ? · You are very worried or anxious about your pain. ? · You have trouble taking your pain medicine. ? · You have any concerns about your pain medicine. ? · You have trouble with bowel movements, such as:  ¨ No bowel movement in 3 days. ¨ Blood in the anal area, in your stool, or on the toilet paper. ¨ Diarrhea for more than 24 hours. Where can you learn more? Go to http://robert-leno.info/. Enter N004 in the search box to learn more about \"Chronic Pain: Care Instructions. \"  Current as of: October 14, 2016  Content Version: 11.4  © 3308-9360 [employer]. Care instructions adapted under license by [employer] (which disclaims liability or warranty for this information). If you have questions about a medical condition or this instruction, always ask your healthcare professional. [address] any warranty or liability for your use of this information.

## 2018-06-15 NOTE — ED NOTES
Pt oxygen saturation dropping to 86, went into room to apply oxygen , pt states he does not want oxygen

## 2018-06-18 VITALS
RESPIRATION RATE: 28 BRPM | OXYGEN SATURATION: 78 % | DIASTOLIC BLOOD PRESSURE: 40 MMHG | SYSTOLIC BLOOD PRESSURE: 98 MMHG | HEART RATE: 103 BPM

## 2019-01-04 NOTE — PROGRESS NOTES
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 Reached patient and identified self/role. Mr. Dunia Pineda stated \"I'm feeling pretty good. My IV antibiotics finished yesterday and they took out my PICC line\"  Patient will be receiving physical therapy from [facility] beginning 4/10/17. Taking medications as directed and eating well. Patient stated after some physical therapy he will be back to his normal baseline. Voiced no questions or concerns. Goals met:  No hospitalization or ED visit 30 days from discharge on 2/28/17.

## 2020-11-10 NOTE — ROUTINE PROCESS
Bedside and Verbal shift change report given to SHERLY Beyer (oncoming nurse) by Chi Trevino RN (offgoing nurse). Report included the following information SBAR, Kardex, MAR and Recent Results.     SITUATION:    Code Status: Full Code   Reason for Admission: Osteomyelitis ([facility] [address]  Yuval Gandhi 50 day: 7   Problem List:       Hospital Problems  Date Reviewed: 2/24/2017          Codes Class Noted POA    Osteomyelitis [facility]) ICD-10-CM: M86.9  ICD-9-CM: 730.20  2/20/2017 Unknown              BACKGROUND:    Past Medical History:   Past Medical History:   Diagnosis Date    Acute lower GI bleeding 4/9/2016    Anemia due to acute blood loss 4/9/2016    Attributed to lower GI bleed    Benign hypertension without congestive heart failure     Bleeding risk due to aspirin 4/9/2016    Aspirin + Clopidogrel    Cancer (HCC)     hx squamous cell    Carotid artery disease ([facility] [address] 3/8/2016    Chemotherapy convalescence or palliative care Nov. 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 [address]     Radiation therapy complication Nov 8504 to Jan 2014    Rectal CA    Spinal stenosis of lumbar region     Vitamin D insufficiency 4/16/2016    Vitamin D 25-Hydroxy (4/16/2016) = 25.6         Patient taking anticoagulants no     ASSESSMENT:    Changes in Assessment Throughout Shift: no     Patient has Central Line: no Reasons if yes: n/a   Patient has Brandt Cath: no Reasons if yes: n/a      Last Vitals:     Vitals:    02/27/17 0410 02/27/17 0826 02/27/17 1213 02/27/17 1553   BP: 164/66 161/67 138/53 159/65   Pulse: (!) 58 62 65 70   Resp: 18 18 18 17   Temp: 97.8 °F (36.6 °C) 97.6 °F (36.4 °C) 97.9 °F (36.6 °C) 98.6 °F (37 °C)   SpO2: 98% 98% 98% 98%   Weight:       Height:            IV and DRAINS (will only show if present)   Peripheral IV 02/20/17 Right Antecubital-Site Assessment: Clean, dry, & intact     WOUND (if present)   Wound Type:  none   Dressing present Dressing Present : No   Wound Concerns/Notes:  none     PAIN    Pain Assessment    Pain Intensity 1: 2 (02/27/17 1639)    Pain Location 1: Ankle, Foot, Toe (comment which one)    Pain Intervention(s) 1: Refused, Elevation    Patient Stated Pain Goal: 0  o Interventions for Pain:  none  o Intervention effective: n/a  o Time of last intervention: none  o Reassessment Completed: n/a     Last 3 Weights:  Last 3 Recorded Weights in this Encounter    02/20/17 1207 02/21/17 0618 02/22/17 0439   Weight: 77.1 kg (170 lb) 77.4 kg (170 lb 10.2 oz) 76.5 kg (168 lb 10.4 oz)     Weight change:      INTAKE/OUPUT    Current Shift:      Last three shifts: 02/26 0701 - 02/27 1900  In: -   Out: 1      LAB RESULTS     Recent Labs      02/26/17   0355   WBC  5.7   HGB  10.0*   HCT  31.6*   PLT  318        Recent Labs      02/27/17   0245  02/26/17   0355  02/25/17   0320   NA  140  142  140   K  3.9  3.8  4.3   GLU  99  95  92   BUN  16  15  16   CREA  1.14  1.27  1.18   CA  8.8  9.2  8.6       RECOMMENDATIONS AND DISCHARGE PLANNING     1. Pending tests/procedures/ Plan of Care or Other Needs: For picc line     2. Discharge plan for patient and Needs/Barriers: Home    3.  Estimated Discharge Date: 02/28/17 Posted on Whiteboard in [address] [place] [address] Room: yes      4. The patient's care plan was reviewed with the oncoming nurse. \"HEALS\" SAFETY CHECK      Fall Risk    Total Score: 2    Safety Measures: Safety Measures: Bed/Chair alarm on, Bed/Chair-Wheels locked, Bed in low position, Call light within reach, Side rails X 3    A safety check occurred in the patient's room between off going nurse and oncoming nurse listed above. The safety check included the below items  Area Items   H  High Alert Medications - Verify all high alert medication drips (heparin, PCA, etc.)   E  Equipment - Suction is set up for ALL patients (with herminia)  - Red plugs utilized for all equipment (IV pumps, etc.)  - WOWs wiped down at end of shift.  - Room stocked with oxygen, suction, and other unit-specific supplies   A  Alarms - Bed alarm is set for fall risk patients  - Ensure chair alarm is in place and activated if patient is up in a chair   L  Lines - Check IV for any infiltration  - Brandt bag is empty if patient has a Brandt   - Tubing and IV bags are labeled   S  Safety   - Room is clean, patient is clean, and equipment is clean. - Hallways are clear from equipment besides carts. - Fall bracelet on for fall risk patients  - Ensure room is clear and free of clutter  - Suction is set up for ALL patients (with herminia)  - Hallways are clear from equipment besides carts.    - Isolation precautions followed, supplies available outside room, sign posted     Andrade Bass RN 18

## 2021-10-05 NOTE — INTERVAL H&P NOTE
H&P Update:  Syeda Blas was seen and examined. History and physical has been reviewed. The patient has been examined.  There have been no significant clinical changes since the completion of the originally dated History and Physical.    Signed By: Shelby August MD     February 21, 2017 12:54 PM
PAST SURGICAL HISTORY:  No significant past surgical history

## 2025-05-14 NOTE — PROGRESS NOTES
Infectious Disease progress Note    Requested by: Dr. Amy Ramos    Reason:     Current abx Prior abx   Vancomycin since 2/20  Pip/tazo since 2/22 Ceftriaxone 2/20     Lines:       Assessment :    80 y.o. male with h/o anemia, GI bleed, HTN, dyslipidemia, vitamin D deficiency, hypercholesterolemia and cancer who presented to ED on 2/20 complaining of second toe pain, edema and erythema on the L foot onset 4 days ago    prior cultures reveal pseudomonas in wound culture right leg, left heel in 7/2016; mrsa left great toe in 12/2015    Clinical picture consistent with left second toe abscess, left second toe distal tip chronic osteomyelitis, left foot cellulitis in setting of peripheral arterial disease. Patient has been appropriately managed with vascular intervention on 2/21. Exact microbial etiology of infection is not known. Chronicity of illness likely suggest polymicrobial infection with gram negatives/gram positives such as mrsa. Wound cultures 2/22: negative. S/p left second toe amputation on 2/24 - intra op cultures negative. Prior outpatient trimeth/sulfa and ciprofloxacin likely masking the cultures. Patient had definitive purulence of left second toe prior to surgery. High risk of acute osteomyelitis of the clean margins. Bone biopsy 2/24 reveals evidence of acute osteomyelitis. Acute inflammation extends to the soft tissue. Recommendations:    1. Continue vancomycin, pip/tazo till 4/7  2. Needs weekly cbc, renal function, crp while on antibiotics  3. F/u with dr. Guru Douglas was discussed in details with , dr Amy Ramos. Please call me if any further questions or concerns. Will continue to participate in the care of this patient. subjective:          home Medication List    Details   cephALEXin (KEFLEX) 500 mg capsule Take 1 Cap by mouth four (4) times daily for 7 days.   Qty: 28 Cap, Refills: 0      levothyroxine (SYNTHROID) 88 mcg tablet TAKE ONE TABLET BY MOUTH ONCE DAILY BEFORE BREAKFAST  Qty: 90 Tab, Refills: 3    Associated Diagnoses: Hypothyroidism, unspecified type      amLODIPine (NORVASC) 5 mg tablet TAKE ONE TABLET BY MOUTH ONCE DAILY  Qty: 90 Tab, Refills: 3    Associated Diagnoses: Essential hypertension      isosorbide mononitrate ER (IMDUR) 60 mg CR tablet 1 tablet each AM  Qty: 90 Tab, Refills: 3      clopidogrel (PLAVIX) 75 mg tab Take 1 Tab by mouth daily (with dinner). Qty: 90 Tab, Refills: 6      lovastatin (MEVACOR) 20 mg tablet Take 1 Tab by mouth daily. Qty: 90 Tab, Refills: 3      oxyCODONE-acetaminophen (PERCOCET 7.5) 7.5-325 mg per tablet 1 tablet every six hours as needed for pain  Qty: 120 Tab, Refills: 0      oxybutynin (DITROPAN) 5 mg tablet Take 1 Tab by mouth three (3) times daily. Indications: BLADDER HYPERACTIVITY, URINARY URGENCY  Qty: 90 Tab, Refills: 6    Associated Diagnoses: Dysuria; Benign prostatic hyperplasia, presence of lower urinary tract symptoms unspecified, unspecified morphology; Radiation cystitis      multivitamin with iron tablet Take 1 Tab by mouth daily. POLYETHYLENE GLYCOL 3350 (MIRALAX PO) Take  by mouth as needed. tamsulosin (FLOMAX) 0.4 mg capsule 1 tablet daily at supper  Qty: 30 Cap, Refills: 6      aspirin 81 mg chewable tablet Take 1 Tab by mouth daily. Qty: 30 Tab, Refills: 0      trimethoprim-sulfamethoxazole (BACTRIM DS) 160-800 mg per tablet Take 1 Tab by mouth two (2) times a day for 7 days.   Qty: 14 Tab, Refills: 0             Current Facility-Administered Medications   Medication Dose Route Frequency    alteplase (CATHFLO) 1 mg in sterile water (preservative free) 1 mL injection  1 mg InterCATHeter PRN    vancomycin (VANCOCIN) 1,250 mg in 0.9% sodium chloride 250 mL IVPB  1,250 mg IntraVENous Q18H    piperacillin-tazobactam (ZOSYN) 3.375 g in 0.9% sodium chloride (MBP/ADV) 100 mL MBP  3.375 g IntraVENous Q8H    loperamide (IMODIUM) capsule 2 mg  2 mg Oral Q4H PRN    sodium hypochlorite (QUARTER STRENGTH DAKIN'S) 0.125% irrigation (bottle)   Topical BID    amLODIPine (NORVASC) tablet 5 mg  5 mg Oral DAILY    isosorbide mononitrate ER (IMDUR) tablet 60 mg  60 mg Oral DAILY    levothyroxine (SYNTHROID) tablet 88 mcg  88 mcg Oral ACB    lovastatin (MEVACOR) tablet 20 mg  20 mg Oral DAILY    oxybutynin (DITROPAN) tablet 5 mg  5 mg Oral DAILY    tamsulosin (FLOMAX) capsule 0.4 mg  0.4 mg Oral DAILY    0.9% sodium chloride infusion  75 mL/hr IntraVENous CONTINUOUS    clopidogrel (PLAVIX) tablet 75 mg  75 mg Oral DAILY    acetaminophen (TYLENOL) tablet 650 mg  650 mg Oral Q4H PRN    oxyCODONE-acetaminophen (PERCOCET) 5-325 mg per tablet 1 Tab  1 Tab Oral Q4H PRN    morphine injection 1 mg  1 mg IntraVENous Q1H PRN    ondansetron (ZOFRAN) injection 4 mg  4 mg IntraVENous Q6H PRN    diphenhydrAMINE (BENADRYL) injection 12.5 mg  12.5 mg IntraVENous Q4H PRN       Allergies: Latex and Tape [adhesive]    Temp (24hrs), Av.9 °F (36.6 °C), Min:97.4 °F (36.3 °C), Max:98.6 °F (37 °C)    Visit Vitals    /65 (BP 1 Location: Left arm, BP Patient Position: At rest)    Pulse 63    Temp 98.1 °F (36.7 °C)    Resp 20    Ht 6' (1.829 m)    Wt 76.5 kg (168 lb 10.4 oz)    SpO2 98%    BMI 22.87 kg/m2       ROS: 12 point ROS obtained in details. Pertinent positives as mentioned in HPI,   otherwise negative    Physical Exam:        Labs: Results:   Chemistry Recent Labs      17   0343  17   0245  17   0355   GLU  90  99  95   NA  142  140  142   K  3.9  3.9  3.8   CL  107  105  109*   CO2  29  28  26   BUN  15  16  15   CREA  1.03  1.14  1.27   CA  9.0  8.8  9.2   AGAP  6  7  7   BUCR  15  14  12      CBC w/Diff Recent Labs      17   0355   WBC  5.7   RBC  3.51*   HGB  10.0*   HCT  31.6*   PLT  318   GRANS  60   LYMPH  19*   EOS  5      Microbiology No results for input(s): CULT in the last 72 hours.        RADIOLOGY:    All available imaging studies/reports in [facility] for this admission were reviewed    Dr. Melissa Contreras, Infectious Disease Specialist  [phone]  February 28, 2017  4:25 PM operating room

## 2025-07-29 NOTE — ED TRIAGE NOTES
Portal message sent to mother.    Pt arrived via medic after a fall, pt has stage 4 cancer, is not on hospice but desires to be, has chronic pain and has been falling at home, pts family states they can not take care of him anymore, pt told to come here by primary care doctor for chronic pain, family wants pt to be admitted

## (undated) DEVICE — BANDAGE COMPR W4INXL5YD BGE COHESIVE SELF ADH ADBAN CBN1104] AVCOR HEALTHCARE PRODUCTS INC]

## (undated) DEVICE — TRAP SUC MUCOUS 70ML -- MEDICHOICE MEDLINE

## (undated) DEVICE — TABLE COVER: Brand: CONVERTORS

## (undated) DEVICE — FLEX ADVANTAGE 3000CC: Brand: FLEX ADVANTAGE

## (undated) DEVICE — KIT CLN UP BON SECOURS MARYV

## (undated) DEVICE — BITE BLOCK ENDOSCP UNIV AD 6 TO 9.4 MM

## (undated) DEVICE — APPLICATOR FBR TIP L6IN COT TIP WOOD SHFT SWAB 2000 PER CA

## (undated) DEVICE — FCPS BIOP PULM RAD JAW 100CML -- BX/10 M00515180

## (undated) DEVICE — SET ADMIN L104IN 20 GTT GRAV RLER CLMP SMRT SITE NDL FREE

## (undated) DEVICE — SUT PROL 3-0 18IN PS1 BLU --

## (undated) DEVICE — HEX-LOCKING BLADE ELECTRODE: Brand: EDGE

## (undated) DEVICE — BLADE TNGE REG 6IN WOOD STRL -- CONVERT TO ITEM 153408

## (undated) DEVICE — REAG CYTO FIX 4OZ PMP SPRY --

## (undated) DEVICE — CONTAINER PREFIL FRMLN 40ML --

## (undated) DEVICE — WANG TRANSBRONCHIAL HISTOLOGY NEEDLE FOR CENTRAL REGION, 1.9 MM X 130 CM: Brand: WANG

## (undated) DEVICE — MAJ-1414 SINGLE USE ADPATER BIOPSY VALV: Brand: SINGLE USE ADAPTOR BIOPSY VALVE

## (undated) DEVICE — NDL BIOP VIZISHOT ASPR 40MM --

## (undated) DEVICE — DRAPE TWL SURG 16X26IN BLU ORB04] ALLCARE INC]

## (undated) DEVICE — NEEDLE HYPO 25GA L1.5IN BVL ORIENTED ECLIPSE

## (undated) DEVICE — SLEEVE COMPR STD 12 IN FOR 165IN CALF COMFORT VENODYNE SYS

## (undated) DEVICE — (D)SYR 10ML SLIP TIP 1/5ML GRD -- DISC BY MFR USE ITEM 338000

## (undated) DEVICE — GOWN ISOL IMPERV UNIV, DISP, OPEN BACK, BLUE --

## (undated) DEVICE — GAUZE SPONGES,16 PLY: Brand: CURITY

## (undated) DEVICE — SINGLE USE BIOPSY VALVE MAJ-210: Brand: SINGLE USE BIOPSY VALVE (STERILE)

## (undated) DEVICE — NDL BX EXCELON 21GX130CM --

## (undated) DEVICE — BASIN EMESIS 500CC ROSE 250/CS 60/PLT: Brand: MEDEGEN MEDICAL PRODUCTS, LLC

## (undated) DEVICE — (D)PREP SKN CHLRAPRP APPL 26ML -- CONVERT TO ITEM 371833

## (undated) DEVICE — ADAPTER TBNG DIA15MM SWVL FBROPT BRONCHSCP TERM 2 AXIS PEEP

## (undated) DEVICE — 3M™ BAIR PAWS FLEX™ WARMING GOWN, STANDARD, 20 PER CASE 81003: Brand: BAIR PAWS™

## (undated) DEVICE — PACK PROCEDURE SURG MAJ W/ BASIN LF

## (undated) DEVICE — SINGLE USE SUCTION VALVE MAJ-209: Brand: SINGLE USE SUCTION VALVE (STERILE)

## (undated) DEVICE — MEDI-VAC NON-CONDUCTIVE SUCTION TUBING: Brand: CARDINAL HEALTH

## (undated) DEVICE — PENROSE TUBING RADIOPAQUE: Brand: ARGYLE

## (undated) DEVICE — PRECISION THIN (9.0 X 0.38 X 25.0MM)

## (undated) DEVICE — MEDI-VAC SUCTION HIGH CAPACITY: Brand: CARDINAL HEALTH

## (undated) DEVICE — BRUSH CYTO BRONCHSCP 1.5/140MM -- CELLEBRITY

## (undated) DEVICE — Device: Brand: MEDEX

## (undated) DEVICE — DRAPE,UNDERBUTTOCKS,PCH,STERILE: Brand: MEDLINE

## (undated) DEVICE — CURITY NON-ADHERENT STRIPS: Brand: CURITY

## (undated) DEVICE — AIRLIFE™ ADULT OXYGEN MASK VINYL, UNDER-THE-CHIN STYLE, MEDIUM CONCENTRATION MASK WITH 7 FEET (2.1 M) CRUSH-RESISTANT OXYGEN TUBING: Brand: AIRLIFE™

## (undated) DEVICE — GOWN,REINFORCED,POLY,AURORA,XLARGE,STRL: Brand: MEDLINE

## (undated) DEVICE — WANG TRANSBRONCHIAL HISTOLOGY NEEDLE FOR PERIPHERAL REGION, 19 G, 1.9 MM X 130 CM: Brand: WANG

## (undated) DEVICE — 1860 HEALTH CARE N95 MASK, 20EACH/BOX  6 BX/C: Brand: 3M™

## (undated) DEVICE — AIRLIFE™ NASAL OXYGEN CANNULA CURVED, FLARED TIP WITH 14 FOOT (4.3 M) CRUSH-RESISTANT TUBING, OVER-THE-EAR STYLE: Brand: AIRLIFE™

## (undated) DEVICE — AIRLIFE™ MISTY MAX 10™ NEBULIZER WITH 7 FOOT (2.1 M) CRUSH RESISTANT OXYGEN TUBING, BAFFLED TEE ADAPTER (22 MM I.D./22 MM O.D.), MOUTHPIECE AND 6 INCH (15 CM) FLEXTUBE: Brand: AIRLIFE™

## (undated) DEVICE — SLIDE MICRO PLAIN PRECLEAND --

## (undated) DEVICE — 1860S HEALTH CARE RESPIRATOR N95 120EA/C: Brand: 3M™

## (undated) DEVICE — KENDALL SCD EXPRESS SLEEVES, KNEE LENGTH, MEDIUM: Brand: KENDALL SCD

## (undated) DEVICE — INTENDED FOR TISSUE SEPARATION, AND OTHER PROCEDURES THAT REQUIRE A SHARP SURGICAL BLADE TO PUNCTURE OR CUT.: Brand: BARD-PARKER SAFETY BLADES SIZE 10, STERILE

## (undated) DEVICE — SOLUTION IV 1000ML 0.9% SOD CHL

## (undated) DEVICE — THREE-QUARTER SHEET: Brand: CONVERTORS

## (undated) DEVICE — Device: Brand: BALLOON

## (undated) DEVICE — DERMACEA GAUZE ROLL: Brand: DERMACEA

## (undated) DEVICE — MAILER SLDE MICSCP 2 PLC --

## (undated) DEVICE — CATHETER SUCT TR FL TIP 14FR W/ O CTRL

## (undated) DEVICE — DRAPE,EXTREMITY,89X128,STERILE: Brand: MEDLINE

## (undated) DEVICE — REM POLYHESIVE ADULT PATIENT RETURN ELECTRODE: Brand: VALLEYLAB

## (undated) DEVICE — MASK SURG REG ORNG LEV 3 SFTY SEAL 4 LAYR SFT INNR LINING

## (undated) DEVICE — STERILE POLYISOPRENE POWDER-FREE SURGICAL GLOVES: Brand: PROTEXIS

## (undated) DEVICE — SINGLE-USE BIOPSY FORCEPS: Brand: RADIAL JAW 4

## (undated) DEVICE — BE 105-8 BRONCHOSCOPE SWIVEL - 15MM ID/22MM OD (PATIENT PORT) X15MM OD (EQUIPMENT PORT). REUSABLE.  FITS COMPONENTS OF ADULT VENTILATOR CIRCUITS.  MOLDED OF POLYETHERIMIDE. INCLUDES TWO SILICONE RUBBER CAPS; ONE CAP ALLOWS FOR THE USE OF A SUCTIONING CATHETER WHILE THE OTHER CAP ALLOWS FOR THE USE OF A FIBER-OPTIC BRONCHOSCOPE WITHOUT SIGNIFICANT LOSS OF PEEP.: Brand: BE 105-8 BRONCHOSCOPE SWIVEL